# Patient Record
Sex: FEMALE | Race: WHITE | NOT HISPANIC OR LATINO | Employment: UNEMPLOYED | URBAN - METROPOLITAN AREA
[De-identification: names, ages, dates, MRNs, and addresses within clinical notes are randomized per-mention and may not be internally consistent; named-entity substitution may affect disease eponyms.]

---

## 2017-01-06 ENCOUNTER — HOSPITAL ENCOUNTER (OUTPATIENT)
Dept: PREADMISSION TESTING | Facility: HOSPITAL | Age: 52
Discharge: HOME | End: 2017-01-06

## 2017-10-26 ENCOUNTER — LAB VISIT (OUTPATIENT)
Dept: LAB | Facility: HOSPITAL | Age: 52
End: 2017-10-26
Attending: PSYCHIATRY & NEUROLOGY
Payer: COMMERCIAL

## 2017-10-26 ENCOUNTER — OFFICE VISIT (OUTPATIENT)
Dept: NEUROLOGY | Facility: CLINIC | Age: 52
End: 2017-10-26
Payer: COMMERCIAL

## 2017-10-26 VITALS
BODY MASS INDEX: 28.48 KG/M2 | SYSTOLIC BLOOD PRESSURE: 128 MMHG | RESPIRATION RATE: 16 BRPM | DIASTOLIC BLOOD PRESSURE: 84 MMHG | WEIGHT: 154.75 LBS | HEART RATE: 76 BPM | HEIGHT: 62 IN

## 2017-10-26 DIAGNOSIS — R41.3 MEMORY LOSS: ICD-10-CM

## 2017-10-26 DIAGNOSIS — R41.3 MEMORY LOSS: Primary | ICD-10-CM

## 2017-10-26 LAB — VIT B12 SERPL-MCNC: 1066 PG/ML

## 2017-10-26 PROCEDURE — 99204 OFFICE O/P NEW MOD 45 MIN: CPT | Mod: S$GLB,,, | Performed by: PSYCHIATRY & NEUROLOGY

## 2017-10-26 PROCEDURE — 82607 VITAMIN B-12: CPT

## 2017-10-26 PROCEDURE — 99999 PR PBB SHADOW E&M-EST. PATIENT-LVL III: CPT | Mod: PBBFAC,,, | Performed by: PSYCHIATRY & NEUROLOGY

## 2017-10-26 PROCEDURE — 86592 SYPHILIS TEST NON-TREP QUAL: CPT

## 2017-10-26 PROCEDURE — 36415 COLL VENOUS BLD VENIPUNCTURE: CPT

## 2017-10-26 RX ORDER — VITAMIN E 268 MG
400 CAPSULE ORAL DAILY
COMMUNITY
End: 2023-05-30

## 2017-10-26 RX ORDER — CHOLECALCIFEROL (VITAMIN D3) 125 MCG
1 CAPSULE ORAL DAILY
COMMUNITY
End: 2018-06-15

## 2017-10-26 RX ORDER — MIRTAZAPINE 15 MG/1
1 TABLET, FILM COATED ORAL DAILY
COMMUNITY
Start: 2017-09-28 | End: 2018-01-31

## 2017-10-26 RX ORDER — OMEPRAZOLE 40 MG/1
1 CAPSULE, DELAYED RELEASE ORAL DAILY
COMMUNITY
Start: 2017-10-04

## 2017-10-26 RX ORDER — LANOLIN ALCOHOL/MO/W.PET/CERES
100 CREAM (GRAM) TOPICAL DAILY
COMMUNITY
End: 2018-05-10

## 2017-10-26 NOTE — PROGRESS NOTES
Consult TERESA Mendez  HPI: Sakina Barrios is a 52 y.o. female with a history of memory problems first noted by patient and her daughters here today. The symptoms started 5 months ago.   Examples of memory problems noted include? Being disoriented to time, forgetting names, difficult time explaining herself. She has one spell in September in which she was confused, dizzy, had a migraine and was in a great deal of distress and was crying and seemed to be panicked.   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 Cooks on stove with carbon monoxide detectors  Mood is described as anxious. The patient does not have delusions, hallucinations, paranoia,  falls, or tremors. There is not a prior history of stroke.There is not a Family History of memory disorders. The patient is not still driving and is still cooking. The patient is not on any memory meds  Prior brain imaging or memory labs work-up? Labs and imaging below.   She reports a long standing history of headaches- she has a history of migraine, for many years which had improved but started again with these symptoms over the past 6 months. The patietn has tried prevention meds prior but is not sure. She uses Toradol PRN with good relief. Dizziness is described as funny feeling but not vertigo.   Family states for one spell of being more confused, she covers her ears and face and needed loud music to calm her. She felt dizzy with this for days.   Feels vision is not perfect but had normal eye exam.   Review of Systems   Constitutional: Negative for fever.   HENT: Negative for nosebleeds.    Eyes: Negative for double vision.   Respiratory: Negative for hemoptysis.    Cardiovascular: Negative for leg swelling.    Gastrointestinal: Negative for blood in stool.   Genitourinary: Negative for hematuria.   Musculoskeletal: Negative for falls.   Skin: Negative for itching.   Neurological: Negative for tremors.   Psychiatric/Behavioral: Positive for memory loss. The patient is nervous/anxious.          I have reviewed all of this patient's past medical and surgical histories as well as family and social histories and active allergies and medications as documented in the electronic medical record.        Exam:  Gen Appearance, well developed/nourished in no apparent distress  CV: 2+ distal pulses with no edema or swelling  Neuro:  MS: Awake, alert, oriented to place, person, time, situation. Sustains attention. Recent recall 2/3 at 3 minutes after hints only/remote memory intact, Language is full to spontaneous speech/repetition/naming/comprehension. Fund of Knowledge is full  There is some pyschomotor slowing and she appears anxious at times.   Names 2/2 presidents with anxiety interfering with effort  Draws clock well with mention that she could not do this for PCP  CN: Optic discs are flat with normal vasculature, PERRL, Extraoccular movements and visual fields are full. Normal facial sensation and strength, Hearing symmetric, Tongue and Palate are midline and strong. Shoulder Shrug symmetric and strong.  Motor: Normal bulk, tone, no abnormal movements. 5/5 strength bilateral upper/lower extremities with 2+ reflexes and not clonus  Sensory: symmetric to temp, and vibration,  Romberg negative  Cerebellar: Finger-nose,Heal-shin, Rapid alternating movements intact  Gait: Normal stance, no ataxia      MRI brain 10/2017: Atrophy but no acute changes    Carotid US 10/2017:  No stenosis    Assessment/Plan: Sakina Barrios is a 52 y.o. female with 5 months of memory loss. Anxiety prominent with other somatic symptoms like dizziness, visual changes without cause found.  I recommend:   1. Could have incidental Atrophy on MRI, but  needs Neuropsychological testing for further work up of memory  2. Suspect Anxiety as a contributor or cause of her complaints. Continue to follow with psychiatry  3. Check B12 RPR today and get results of CMP, CBC TSh  4. Plan to address her Chronic Migraine when more acute issues improve  5. No driving at this time (remain off)  RTC 12 weeks    CC: TERESA Mendez

## 2017-10-26 NOTE — LETTER
October 26, 2017      Donovan Mendez PA-C  [address]  [place]  [place] [address]           [facility]  [address] [facility] [address]  Phone: [phone]  Fax: [phone]          Patient: Sakina Barrios   MR Number: [id number]   YOB: 1965   Date of Visit: 10/26/2017       Dear Donovan Mendez:    Thank you for referring Sakina Barrios to me for evaluation. Attached you will find relevant portions of my assessment and plan of care.    If you have questions, please do not hesitate to call me. I look forward to following Sakina Barrios along with you.    Sincerely,    Bi Zayas MD    Enclosure  CC:  No Recipients    If you would like to receive this communication electronically, please contact [email or web address] or [phone] to request more information on [employer] access.    For providers and/or their staff who would like to refer a patient to [facility], please contact us through our one-stop-shop provider referral line, [facility], at [phone].    If you feel you have received this communication in error or would no longer like to receive these types of communications, please e-mail [email or web address]

## 2017-10-27 LAB — RPR SER QL: NORMAL

## 2017-11-17 ENCOUNTER — TELEPHONE (OUTPATIENT)
Dept: NEUROLOGY | Facility: CLINIC | Age: 52
End: 2017-11-17

## 2017-11-17 NOTE — TELEPHONE ENCOUNTER
It is fine to hold on this for now. Keep follow up as scheduled to discuss driving and any further symptoms.

## 2017-11-17 NOTE — TELEPHONE ENCOUNTER
----- Message from Fauzia Rosario sent at 2017  9:06 AM CST -----  Contact: RADHA Barrios  MRN: [id number]  : 1965  PCP: Donovan Mendez  Home Phone      [phone]  Work Phone      Not on file.  Mobile          [phone]      MESSAGE: The patient states she did not hear from anyone to schedule the memory testing. The patient states she is doing much better at this time. She states her psychiatrist adjusted her medications and she is no longer having memory issues. She was having a lot of issues with depression and anxiety at that same time. Patient does not feel the memory test is necessary at this time depending on Dr. Zayas's advice regarding the test.  Phone:[phone]

## 2018-01-31 ENCOUNTER — PROCEDURE VISIT (OUTPATIENT)
Dept: NEUROLOGY | Facility: CLINIC | Age: 53
End: 2018-01-31
Payer: COMMERCIAL

## 2018-01-31 ENCOUNTER — OFFICE VISIT (OUTPATIENT)
Dept: NEUROLOGY | Facility: CLINIC | Age: 53
End: 2018-01-31
Payer: COMMERCIAL

## 2018-01-31 VITALS
HEIGHT: 62 IN | DIASTOLIC BLOOD PRESSURE: 76 MMHG | HEART RATE: 80 BPM | SYSTOLIC BLOOD PRESSURE: 110 MMHG | WEIGHT: 160.94 LBS | BODY MASS INDEX: 29.62 KG/M2 | RESPIRATION RATE: 18 BRPM

## 2018-01-31 DIAGNOSIS — M54.31 RIGHT SIDED SCIATICA: ICD-10-CM

## 2018-01-31 DIAGNOSIS — M79.18 CERVICAL MYOFASCIAL PAIN SYNDROME: ICD-10-CM

## 2018-01-31 PROCEDURE — 3008F BODY MASS INDEX DOCD: CPT | Mod: S$GLB,,, | Performed by: NURSE PRACTITIONER

## 2018-01-31 PROCEDURE — 99214 OFFICE O/P EST MOD 30 MIN: CPT | Mod: S$GLB,,, | Performed by: NURSE PRACTITIONER

## 2018-01-31 PROCEDURE — 20553 NJX 1/MLT TRIGGER POINTS 3/>: CPT | Mod: PBBFAC | Performed by: NURSE PRACTITIONER

## 2018-01-31 PROCEDURE — 20553 NJX 1/MLT TRIGGER POINTS 3/>: CPT | Mod: S$GLB,,, | Performed by: NURSE PRACTITIONER

## 2018-01-31 PROCEDURE — 99999 PR PBB SHADOW E&M-EST. PATIENT-LVL IV: CPT | Mod: PBBFAC,,, | Performed by: NURSE PRACTITIONER

## 2018-01-31 RX ORDER — ASPIRIN 325 MG
325 TABLET ORAL DAILY
COMMUNITY
End: 2022-10-17 | Stop reason: CLARIF

## 2018-01-31 RX ORDER — GABAPENTIN 300 MG/1
300 CAPSULE ORAL 3 TIMES DAILY
Qty: 90 CAPSULE | Refills: 11 | Status: SHIPPED | OUTPATIENT
Start: 2018-01-31 | End: 2018-03-21 | Stop reason: SDUPTHER

## 2018-01-31 RX ORDER — CYCLOBENZAPRINE HCL 10 MG
10 TABLET ORAL 3 TIMES DAILY PRN
Qty: 60 TABLET | Refills: 3 | Status: SHIPPED | OUTPATIENT
Start: 2018-01-31 | End: 2018-06-28

## 2018-01-31 RX ORDER — CYCLOBENZAPRINE HCL 10 MG
TABLET ORAL
COMMUNITY
Start: 2018-01-09 | End: 2018-01-31 | Stop reason: SDUPTHER

## 2018-01-31 NOTE — PROGRESS NOTES
HPI: Sakina Barrios is a 52 year old  female, evaluated for memory complaints in 10/2017, which resolved with improvement to her anxiety after medication adjustment per psychiatry. She has a history of migraines.     She presents today for a follow up visit. Her short term memory loss complaints resolved with medication adjustments per her psychiatrist, which improved her anxiety. She canceled neuropsych testing accordingly. Anxiety is well controlled currently.     Her headaches also resolved with improvement to her anxiety.     She presents today with complaint of right sciatica, which she experiences intermittently. This began last week. She has had right sciatica for years. The pain is localized to her right buttock, and does not radiate to her RLE. She denies any numbness or tingling. She denies falls, bladder/bowel issues. Standing and sitting worsens her complaints.     She has had left cervical tension/pain since lifting a bag of onions one month ago. She received cervical TPI's from another provider in the past, which was effective.     Review of Systems   Constitutional: Negative for fever.   HENT: Negative for nosebleeds.    Eyes: Negative for double vision.   Respiratory: Negative for hemoptysis.    Cardiovascular: Negative for leg swelling.   Gastrointestinal: Negative for blood in stool.   Genitourinary: Negative for hematuria.   Musculoskeletal: Positive for back pain and neck pain. Negative for falls.   Skin: Negative for itching.   Neurological: Negative for tingling, tremors and headaches.   Psychiatric/Behavioral: Negative for memory loss. The patient is not nervous/anxious.        I have reviewed all of this patient's past medical and surgical histories as well as family and social histories and active allergies and medications as documented in the electronic medical record.    Exam:  Gen Appearance, well developed/nourished in no apparent distress  CV: 2+ distal pulses with no  edema or swelling  Neuro:  MS: Awake, alert, oriented to place, person, time, situation. Sustains attention. Recent recall intact/remote memory intact, Language is full to spontaneous speech/repetition/naming/comprehension. Fund of Knowledge is full. No psychomotor slowing or anxiety noted today.   Names 2/2 presidents with anxiety interfering with effort  Draws clock well with mention that she could not do this for PCP  CN: Optic discs are flat with normal vasculature, PERRL, Extraoccular movements and visual fields are full. Normal facial sensation and strength, Hearing symmetric, Tongue and Palate are midline and strong. Shoulder Shrug symmetric and strong.  Motor: Normal bulk, tone, no abnormal movements. 5/5 strength bilateral upper/lower extremities with 2+ reflexes and not clonus  Sensory: symmetric to temp, and vibration,  Romberg negative  Cerebellar: Finger-nose,Heal-shin, Rapid alternating movements intact  Gait: Normal stance, no ataxia  MSK Survey: negative SLR test bilaterally, palpable trigger points to left trapezius and cervical paraspinal muscles.     Imaging:    MRI Brain 10/2017: Atrophy but no acute changes    Carotid US 10/2017:  No stenosis    Assessment/Plan: Sakina Barrios is a 52 year old  female, evaluated for memory complaints in 10/2017, which resolved with improvement to her anxiety after medication adjustment per psychiatry. She has a history of migraines.     I recommend:   1. C-spine X-rays and MRI L-spine to evaluate for degenerative changes, and obtain prior records from [facility].   2. Consider referral to pain management for right sciatica if needed pending imaging results. She tried PT for her MSK complaints, prior without effect.   3. Start compound cream for cervical complaints and order cervical TPI's, as this was helpful prior.   4. Start Gabapentin 300 mg tid for cervical and lumbar complaints.   5. Continue prn Flexeril for lumbar spasms.   6. Memory loss  resolved with management of her anxiety. She may return to driving at this time.     FU 6 weeks.       1. Could have incidental Atrophy on MRI, but needs Neuropsychological testing for further work up of memory  2. Suspect Anxiety as a contributor or cause of her complaints. Continue to follow with psychiatry  3. Check B12 RPR today and get results of CMP, CBC TSh  4. Plan to address her Chronic Migraine when more acute issues improve  5. No driving at this time (remain off)  RTC 12 weeks    CC: TERESA Mendez

## 2018-03-21 ENCOUNTER — OFFICE VISIT (OUTPATIENT)
Dept: NEUROLOGY | Facility: CLINIC | Age: 53
End: 2018-03-21
Payer: COMMERCIAL

## 2018-03-21 ENCOUNTER — HOSPITAL ENCOUNTER (OUTPATIENT)
Dept: RADIOLOGY | Facility: HOSPITAL | Age: 53
Discharge: HOME | End: 2018-03-21
Attending: NURSE PRACTITIONER
Payer: COMMERCIAL

## 2018-03-21 VITALS
RESPIRATION RATE: 14 BRPM | WEIGHT: 155.19 LBS | SYSTOLIC BLOOD PRESSURE: 112 MMHG | HEART RATE: 72 BPM | BODY MASS INDEX: 28.56 KG/M2 | HEIGHT: 62 IN | DIASTOLIC BLOOD PRESSURE: 82 MMHG

## 2018-03-21 DIAGNOSIS — M54.31 RIGHT SIDED SCIATICA: ICD-10-CM

## 2018-03-21 DIAGNOSIS — M25.552 BILATERAL HIP PAIN: ICD-10-CM

## 2018-03-21 DIAGNOSIS — M25.551 BILATERAL HIP PAIN: ICD-10-CM

## 2018-03-21 DIAGNOSIS — M79.18 CERVICAL MYOFASCIAL PAIN SYNDROME: Primary | ICD-10-CM

## 2018-03-21 DIAGNOSIS — M54.5 LOW BACK PAIN, UNSPECIFIED BACK PAIN LATERALITY, UNSPECIFIED CHRONICITY, WITH SCIATICA PRESENCE UNSPECIFIED: ICD-10-CM

## 2018-03-21 DIAGNOSIS — M54.2 NECK PAIN: ICD-10-CM

## 2018-03-21 DIAGNOSIS — M79.18 CERVICAL MYOFASCIAL PAIN SYNDROME: ICD-10-CM

## 2018-03-21 PROBLEM — M54.50 LOWER BACK PAIN: Status: ACTIVE | Noted: 2018-03-21

## 2018-03-21 PROCEDURE — 99214 OFFICE O/P EST MOD 30 MIN: CPT | Mod: S$GLB,,, | Performed by: NURSE PRACTITIONER

## 2018-03-21 PROCEDURE — 72040 X-RAY EXAM NECK SPINE 2-3 VW: CPT | Mod: TC

## 2018-03-21 PROCEDURE — 99999 PR PBB SHADOW E&M-EST. PATIENT-LVL III: CPT | Mod: PBBFAC,,, | Performed by: NURSE PRACTITIONER

## 2018-03-21 PROCEDURE — 73521 X-RAY EXAM HIPS BI 2 VIEWS: CPT | Mod: TC

## 2018-03-21 PROCEDURE — 72040 X-RAY EXAM NECK SPINE 2-3 VW: CPT | Mod: 26,,, | Performed by: RADIOLOGY

## 2018-03-21 PROCEDURE — 73521 X-RAY EXAM HIPS BI 2 VIEWS: CPT | Mod: 26,,, | Performed by: RADIOLOGY

## 2018-03-21 RX ORDER — GABAPENTIN 100 MG/1
300 CAPSULE ORAL 3 TIMES DAILY
Qty: 270 CAPSULE | Refills: 11 | Status: SHIPPED | OUTPATIENT
Start: 2018-03-21 | End: 2018-03-21 | Stop reason: SDUPTHER

## 2018-03-21 RX ORDER — GABAPENTIN 100 MG/1
100 CAPSULE ORAL 3 TIMES DAILY
Qty: 90 CAPSULE | Refills: 11 | Status: SHIPPED | OUTPATIENT
Start: 2018-03-21 | End: 2018-07-31 | Stop reason: SDUPTHER

## 2018-03-21 RX ORDER — QUETIAPINE FUMARATE 100 MG/1
200 TABLET, FILM COATED ORAL NIGHTLY
COMMUNITY
Start: 2018-03-05 | End: 2019-05-22

## 2018-03-21 RX ORDER — PROMETHAZINE HYDROCHLORIDE AND DEXTROMETHORPHAN HYDROBROMIDE 6.25; 15 MG/5ML; MG/5ML
5 SYRUP ORAL 3 TIMES DAILY PRN
COMMUNITY
Start: 2018-03-19 | End: 2018-04-24

## 2018-03-21 NOTE — PROGRESS NOTES
HPI: Sakina Barrios is a 52 year old  female, evaluated for memory complaints in 10/2017, which resolved with improvement to her anxiety after medication adjustment per psychiatry. She has a history of migraines.     She presents today for a follow up visit. Lumbar and cervical imaging was ordered at her last visit, but was not completed, as she was waiting to change insurance plans.     She received cervical TPI's on 1/31/2018 for her cervical complaints, which provided her with 90% relief, and lasted for 3 weeks. Her neck pain has recently returned. She would like to repeat the TPI's. Records from [facility] were not obtained after request.     Compound cream is helpful for her MSK complaints.     Gabapentin was started at her last visit, which was very helpful for her neck and lower back pain, but resulted in slurred speech. She would like to try a lower dose of Gabapentin.     Her short term memory loss complaints resolved with medication adjustments per her psychiatrist, which improved her anxiety. She canceled neuropsych testing accordingly. Anxiety is well controlled currently.     Her headaches also resolved with improvement to her anxiety.     She continues with complaint of right sciatica, which she experiences intermittently, and began two months ago. She has had right sciatica for years. The pain is localized to her right buttock, and does not radiate to her RLE. She denies any numbness or tingling. She denies falls, bladder/bowel issues. Standing and sitting worsens her complaints. She also admits to bilateral hip pain.     She has had left cervical tension/pain since lifting a bag of onions three months ago.     Review of Systems   Constitutional: Negative for fever.   HENT: Negative for nosebleeds.    Eyes: Negative for double vision.   Respiratory: Negative for hemoptysis.    Cardiovascular: Negative for leg swelling.   Gastrointestinal: Negative for blood in stool.   Genitourinary:  Negative for hematuria.   Musculoskeletal: Positive for back pain, joint pain and neck pain. Negative for falls.   Skin: Negative for itching.   Neurological: Negative for tingling, tremors and headaches.   Psychiatric/Behavioral: Negative for memory loss. The patient is not nervous/anxious.        I have reviewed all of this patient's past medical and surgical histories as well as family and social histories and active allergies and medications as documented in the electronic medical record.    Exam:  Gen Appearance, well developed/nourished in no apparent distress  CV: 2+ distal pulses with no edema or swelling  Neuro:  MS: Awake, alert, oriented to place, person, time, situation. Sustains attention. Recent recall intact/remote memory intact, Language is full to spontaneous speech/repetition/naming/comprehension. Fund of Knowledge is full. No psychomotor slowing or anxiety noted today.   Names 2/2 presidents with anxiety interfering with effort  Draws clock well with mention that she could not do this for PCP  CN: Optic discs are flat with normal vasculature, PERRL, Extraoccular movements and visual fields are full. Normal facial sensation and strength, Hearing symmetric, Tongue and Palate are midline and strong. Shoulder Shrug symmetric and strong.  Motor: Normal bulk, tone, no abnormal movements. 5/5 strength bilateral upper/lower extremities with 2+ reflexes and not clonus  Sensory: symmetric to temp, and vibration,  Romberg negative  Cerebellar: Finger-nose,Heal-shin, Rapid alternating movements intact  Gait: Normal stance, no ataxia  MSK Survey: negative SLR test bilaterally, palpable trigger points to left trapezius and cervical paraspinal muscles.     Imaging:  C-spine X-rays and MRI L-spine ordered at 1/2018 visit not completed.     MRI Brain 10/2017: Atrophy but no acute changes    Carotid US 10/2017:  No stenosis    Assessment/Plan: Sakina Barrios is a 52 year old  female, evaluated for  memory complaints in 10/2017, which resolved with improvement to her anxiety after medication adjustment per psychiatry. She has a history of migraines.     I recommend:   1. C-spine X-rays, bilateral hip X-rays, and MRI L-spine to evaluate for degenerative changes, and obtain prior records from [facility].   2. Consider referral to pain management for right sciatica if needed pending imaging results. She tried PT for her MSK complaints, prior without effect.   3. Continue compound cream for cervical complaints and repeat cervical TPI's, as this was helpful prior.   4. Lower Gabapentin to 100 mg tid for cervical and lumbar complaints, as she had sedation and speech issues with 300 mg tid dosing.    5. Continue prn Flexeril for lumbar spasms.   6. Memory loss resolved with management of her anxiety. She is clear to drive.     FU 6 weeks.     CC: Donovan Mendez NP

## 2018-03-27 ENCOUNTER — HOSPITAL ENCOUNTER (OUTPATIENT)
Dept: RADIOLOGY | Facility: HOSPITAL | Age: 53
Discharge: HOME | End: 2018-03-27
Attending: NURSE PRACTITIONER
Payer: COMMERCIAL

## 2018-03-27 DIAGNOSIS — M54.5 LOW BACK PAIN, UNSPECIFIED BACK PAIN LATERALITY, UNSPECIFIED CHRONICITY, WITH SCIATICA PRESENCE UNSPECIFIED: ICD-10-CM

## 2018-03-27 PROCEDURE — 72148 MRI LUMBAR SPINE W/O DYE: CPT | Mod: 26,,, | Performed by: RADIOLOGY

## 2018-03-27 PROCEDURE — 72148 MRI LUMBAR SPINE W/O DYE: CPT | Mod: TC

## 2018-03-29 DIAGNOSIS — M54.31 RIGHT SIDED SCIATICA: Primary | ICD-10-CM

## 2018-04-02 ENCOUNTER — TELEPHONE (OUTPATIENT)
Dept: OBSTETRICS AND GYNECOLOGY | Facility: CLINIC | Age: 53
End: 2018-04-02

## 2018-04-02 DIAGNOSIS — Z12.31 ENCOUNTER FOR SCREENING MAMMOGRAM FOR BREAST CANCER: Primary | ICD-10-CM

## 2018-04-02 NOTE — TELEPHONE ENCOUNTER
----- Message from Jessy Mendez MA sent at 4/2/2018  3:55 PM CDT -----  Contact: marisabel Barrios  MRN: [id number]  Home Phone      [phone]  Work Phone      Not on file.  Mobile          [phone]    Patient Care Team:  Donovan Mendez PA-C as PCP - General (Family Medicine)  OB? No  What phone number can you be reached at?  [phone]  Message:  Please link mammo orders to appt 05/10/18.  Thanks!

## 2018-04-04 ENCOUNTER — PROCEDURE VISIT (OUTPATIENT)
Dept: NEUROLOGY | Facility: CLINIC | Age: 53
End: 2018-04-04
Payer: COMMERCIAL

## 2018-04-04 DIAGNOSIS — M79.18 CERVICAL MYOFASCIAL PAIN SYNDROME: ICD-10-CM

## 2018-04-04 PROCEDURE — 20553 NJX 1/MLT TRIGGER POINTS 3/>: CPT | Mod: S$GLB,,, | Performed by: NURSE PRACTITIONER

## 2018-04-24 ENCOUNTER — LAB VISIT (OUTPATIENT)
Dept: LAB | Facility: HOSPITAL | Age: 53
End: 2018-04-24
Attending: NURSE PRACTITIONER
Payer: COMMERCIAL

## 2018-04-24 ENCOUNTER — OFFICE VISIT (OUTPATIENT)
Dept: NEUROLOGY | Facility: CLINIC | Age: 53
End: 2018-04-24
Payer: COMMERCIAL

## 2018-04-24 VITALS
WEIGHT: 156.06 LBS | RESPIRATION RATE: 16 BRPM | HEIGHT: 62 IN | BODY MASS INDEX: 28.72 KG/M2 | HEART RATE: 72 BPM | SYSTOLIC BLOOD PRESSURE: 112 MMHG | DIASTOLIC BLOOD PRESSURE: 78 MMHG

## 2018-04-24 DIAGNOSIS — M79.10 MYALGIA: ICD-10-CM

## 2018-04-24 DIAGNOSIS — M79.18 CERVICAL MYOFASCIAL PAIN SYNDROME: ICD-10-CM

## 2018-04-24 DIAGNOSIS — M54.2 NECK PAIN: ICD-10-CM

## 2018-04-24 DIAGNOSIS — M62.838 CERVICAL PARASPINAL MUSCLE SPASM: ICD-10-CM

## 2018-04-24 DIAGNOSIS — M54.31 RIGHT SIDED SCIATICA: ICD-10-CM

## 2018-04-24 DIAGNOSIS — M25.552 BILATERAL HIP PAIN: ICD-10-CM

## 2018-04-24 DIAGNOSIS — M54.5 LOW BACK PAIN, UNSPECIFIED BACK PAIN LATERALITY, UNSPECIFIED CHRONICITY, WITH SCIATICA PRESENCE UNSPECIFIED: ICD-10-CM

## 2018-04-24 DIAGNOSIS — M79.10 MYALGIA: Primary | ICD-10-CM

## 2018-04-24 DIAGNOSIS — M25.551 BILATERAL HIP PAIN: ICD-10-CM

## 2018-04-24 LAB
25(OH)D3+25(OH)D2 SERPL-MCNC: 20 NG/ML
ALBUMIN SERPL BCP-MCNC: 3.2 G/DL
ALP SERPL-CCNC: 103 U/L
ALT SERPL W/O P-5'-P-CCNC: 11 U/L
ANION GAP SERPL CALC-SCNC: 11 MMOL/L
AST SERPL-CCNC: 14 U/L
BASOPHILS # BLD AUTO: 0.04 K/UL
BASOPHILS NFR BLD: 0.4 %
BILIRUB SERPL-MCNC: 0.3 MG/DL
BUN SERPL-MCNC: 9 MG/DL
CALCIUM SERPL-MCNC: 9.2 MG/DL
CHLORIDE SERPL-SCNC: 102 MMOL/L
CO2 SERPL-SCNC: 25 MMOL/L
CREAT SERPL-MCNC: 0.7 MG/DL
DIFFERENTIAL METHOD: ABNORMAL
EOSINOPHIL # BLD AUTO: 0.1 K/UL
EOSINOPHIL NFR BLD: 1.2 %
ERYTHROCYTE [DISTWIDTH] IN BLOOD BY AUTOMATED COUNT: 15 %
ERYTHROCYTE [SEDIMENTATION RATE] IN BLOOD BY WESTERGREN METHOD: 35 MM/HR
EST. GFR  (AFRICAN AMERICAN): >60 ML/MIN/1.73 M^2
EST. GFR  (NON AFRICAN AMERICAN): >60 ML/MIN/1.73 M^2
GLUCOSE SERPL-MCNC: 109 MG/DL
HCT VFR BLD AUTO: 38.5 %
HGB BLD-MCNC: 12.8 G/DL
LYMPHOCYTES # BLD AUTO: 3.3 K/UL
LYMPHOCYTES NFR BLD: 28.9 %
MCH RBC QN AUTO: 28.5 PG
MCHC RBC AUTO-ENTMCNC: 33.2 G/DL
MCV RBC AUTO: 86 FL
MONOCYTES # BLD AUTO: 0.5 K/UL
MONOCYTES NFR BLD: 4.8 %
NEUTROPHILS # BLD AUTO: 7.3 K/UL
NEUTROPHILS NFR BLD: 64.7 %
PLATELET # BLD AUTO: 290 K/UL
PMV BLD AUTO: 10.3 FL
POTASSIUM SERPL-SCNC: 3.1 MMOL/L
PROT SERPL-MCNC: 7.2 G/DL
RBC # BLD AUTO: 4.49 M/UL
SODIUM SERPL-SCNC: 138 MMOL/L
VIT B12 SERPL-MCNC: >2000 PG/ML
WBC # BLD AUTO: 11.25 K/UL

## 2018-04-24 PROCEDURE — 99214 OFFICE O/P EST MOD 30 MIN: CPT | Mod: S$GLB,,, | Performed by: NURSE PRACTITIONER

## 2018-04-24 PROCEDURE — 82306 VITAMIN D 25 HYDROXY: CPT

## 2018-04-24 PROCEDURE — 99999 PR PBB SHADOW E&M-EST. PATIENT-LVL V: CPT | Mod: PBBFAC,,, | Performed by: NURSE PRACTITIONER

## 2018-04-24 PROCEDURE — 86431 RHEUMATOID FACTOR QUANT: CPT

## 2018-04-24 PROCEDURE — 36415 COLL VENOUS BLD VENIPUNCTURE: CPT

## 2018-04-24 PROCEDURE — 85651 RBC SED RATE NONAUTOMATED: CPT

## 2018-04-24 PROCEDURE — 85025 COMPLETE CBC W/AUTO DIFF WBC: CPT

## 2018-04-24 PROCEDURE — 86038 ANTINUCLEAR ANTIBODIES: CPT

## 2018-04-24 PROCEDURE — 82607 VITAMIN B-12: CPT

## 2018-04-24 PROCEDURE — 80053 COMPREHEN METABOLIC PANEL: CPT

## 2018-04-24 NOTE — PROGRESS NOTES
HPI: Sakina Barrios is a 52 year old  female, evaluated for memory complaints in 10/2017, which resolved with improvement to her anxiety after medication adjustment per psychiatry. She has a history of migraines.     She presents today for a follow up visit. She received a second set of cervical TPI's on 4/4/2018, which were very effective, but only provided her with one week of relief. Neck pain has been ongoing since some heavy lifting 3 months ago; however, she complains of diffuse musculoskeletal complaints, as well as muscle pain and muscle spasms, worse since reducing her dose of Gabapentin, due to slurred speech. She completed an MRI L-spine, which showed a mild disc bulge at left L3/4, which does not explain her right sciatica. Right sciatica has improved, however.     She was referred to pain management for her right sciatica; however, she has no knowledge of this.     C-spine X-rays were unremarkable for cause of her complaints, and bilateral hip X-rays showed very mild changes only. She denies depression, but does admit to anxiety. She grinds her teeth throughout the day, and at night. No further complaint of memory loss. Anxiety is followed by psychiatry.     Compound cream is helpful for her MSK complaints.     Gabapentin was started at her last visit, which was very helpful for her neck and lower back pain, but resulted in slurred speech. She would like to try a lower dose of Gabapentin.     No headaches at this time.     Review of Systems   Constitutional: Negative for fever.   HENT: Negative for nosebleeds.    Eyes: Negative for double vision.   Respiratory: Negative for hemoptysis.    Cardiovascular: Negative for leg swelling.   Gastrointestinal: Negative for blood in stool.   Genitourinary: Negative for hematuria.   Musculoskeletal: Positive for back pain, joint pain, myalgias and neck pain. Negative for falls.   Skin: Negative for itching.   Neurological: Negative for dizziness,  tingling, tremors, sensory change, focal weakness and headaches.   Psychiatric/Behavioral: Negative for memory loss. The patient is nervous/anxious.        I have reviewed all of this patient's past medical and surgical histories as well as family and social histories and active allergies and medications as documented in the electronic medical record.    Exam:  Gen Appearance, well developed/nourished in no apparent distress  CV: 2+ distal pulses with no edema or swelling  Neuro:  MS: Awake, alert, oriented to place, person, time, situation. Sustains attention. Recent recall intact/remote memory intact, Language is full to spontaneous speech/repetition/naming/comprehension. Fund of Knowledge is full. No psychomotor slowing or anxiety noted today.   Names 2/2 presidents with anxiety interfering with effort  Draws clock well with mention that she could not do this for PCP  CN: Optic discs are flat with normal vasculature, PERRL, Extraoccular movements and visual fields are full. Normal facial sensation and strength, Hearing symmetric, Tongue and Palate are midline and strong. Shoulder Shrug symmetric and strong.  Motor: Normal bulk, tone, no abnormal movements. 5/5 strength bilateral upper/lower extremities with 2+ reflexes and not clonus  Sensory: symmetric to temp, and vibration,  Romberg negative  Cerebellar: Finger-nose,Heal-shin, Rapid alternating movements intact  Gait: Normal stance, no ataxia  MSK Survey: negative SLR test bilaterally, palpable trigger points to left trapezius and cervical paraspinal muscles.     Imaging:  3/2018 MRI L-spine 3/2018:  Minimal desiccation of the L3 disc with a questionable small left paracentral disc bulge at L3-L4.  No significant spinal canal or foraminal encroachment.    3/2018 C-spine X-rays:  Cervical spine, AP and lateral: Vertebral body alignment, heights and disc spaces are within normal limits.  The prevertebral soft tissues are normal.  No fracture or subluxation is  seen.    3/2018 Bilateral hip X-rays:  Bilateral hip, 2 views including AP pelvis: There is mild bilateral acetabular roof sclerosis.  The joint spaces are maintained.  No fracture or dislocation is identified.    MRI Brain 10/2017: Atrophy but no acute changes    Carotid US 10/2017:  No stenosis    Assessment/Plan: Sakina Barrios is a 52 year old  female, evaluated for memory complaints in 10/2017, which resolved with improvement to her anxiety after medication adjustment per psychiatry. She has a history of migraines.     I recommend:   1. Very mild findings on bilateral hip X-rays and MRI L-spine, and C-spine X-rays were overall normal-nothing to explain her diffuse, ongoing MSK complaints, which most likely represent a fibromyalgia; however, I will order Rheum labs to rule out any autoimmune/inflammatory process.   2. CBC, CMP, TSH< B12, Vitamin D, B12, LORRIE, Rheumatoid Factor, and ESR.   3. Start PT for myalgic complaints, bilateral hip pain, lumbar pain, and neck pain. She was referred to pain clinic for right sciatica, but does not recall this; right sciatica has improved.   4. Continue compound cream for cervical complaints and repeat cervical TPI's, as this was helpful prior.   5. Continue Gabapentin to 100 mg tid for cervical and lumbar complaints; she had sedation and speech issues with 300 mg tid dosing, but had more pain relief with this dose. She is also taking 60 mg Cymbalta per psychiatry.   6. Continue prn Flexeril for lumbar spasms-mild relief only.   7. Memory loss resolved with management of her anxiety. She is clear to drive.     FU 3 months.     CC: Donovan Mendez NP

## 2018-04-25 LAB
ANA SER QL IF: NORMAL
RHEUMATOID FACT SERPL-ACNC: <10 IU/ML

## 2018-04-26 DIAGNOSIS — R70.0 ELEVATED SED RATE: Primary | ICD-10-CM

## 2018-05-01 ENCOUNTER — PATIENT MESSAGE (OUTPATIENT)
Dept: ADMINISTRATIVE | Facility: OTHER | Age: 53
End: 2018-05-01

## 2018-05-10 ENCOUNTER — HOSPITAL ENCOUNTER (OUTPATIENT)
Dept: RADIOLOGY | Facility: HOSPITAL | Age: 53
Discharge: HOME | End: 2018-05-10
Attending: OBSTETRICS & GYNECOLOGY
Payer: COMMERCIAL

## 2018-05-10 ENCOUNTER — OFFICE VISIT (OUTPATIENT)
Dept: OBSTETRICS AND GYNECOLOGY | Facility: CLINIC | Age: 53
End: 2018-05-10
Payer: COMMERCIAL

## 2018-05-10 VITALS
DIASTOLIC BLOOD PRESSURE: 81 MMHG | SYSTOLIC BLOOD PRESSURE: 126 MMHG | RESPIRATION RATE: 13 BRPM | HEIGHT: 62 IN | WEIGHT: 156.38 LBS | BODY MASS INDEX: 28.78 KG/M2 | HEART RATE: 75 BPM

## 2018-05-10 VITALS — WEIGHT: 156 LBS | HEIGHT: 62 IN | BODY MASS INDEX: 28.71 KG/M2

## 2018-05-10 DIAGNOSIS — Z01.419 WELL WOMAN EXAM WITH ROUTINE GYNECOLOGICAL EXAM: ICD-10-CM

## 2018-05-10 DIAGNOSIS — N95.1 MENOPAUSAL SYMPTOMS: Primary | ICD-10-CM

## 2018-05-10 DIAGNOSIS — Z12.11 COLON CANCER SCREENING: ICD-10-CM

## 2018-05-10 DIAGNOSIS — Z12.31 ENCOUNTER FOR SCREENING MAMMOGRAM FOR BREAST CANCER: ICD-10-CM

## 2018-05-10 DIAGNOSIS — M79.7 FIBROMYALGIA: ICD-10-CM

## 2018-05-10 PROCEDURE — 77067 SCR MAMMO BI INCL CAD: CPT | Mod: TC

## 2018-05-10 PROCEDURE — 77063 BREAST TOMOSYNTHESIS BI: CPT | Mod: 26,,, | Performed by: RADIOLOGY

## 2018-05-10 PROCEDURE — 99999 PR PBB SHADOW E&M-EST. PATIENT-LVL III: CPT | Mod: PBBFAC,,, | Performed by: OBSTETRICS & GYNECOLOGY

## 2018-05-10 PROCEDURE — 77067 SCR MAMMO BI INCL CAD: CPT | Mod: 26,,, | Performed by: RADIOLOGY

## 2018-05-10 PROCEDURE — 99396 PREV VISIT EST AGE 40-64: CPT | Mod: S$GLB,,, | Performed by: OBSTETRICS & GYNECOLOGY

## 2018-05-10 RX ORDER — ALBUTEROL SULFATE 90 UG/1
AEROSOL, METERED RESPIRATORY (INHALATION)
COMMUNITY
End: 2018-07-31

## 2018-05-10 RX ORDER — ACETAMINOPHEN 500 MG
5000 TABLET ORAL DAILY
COMMUNITY
End: 2023-05-30

## 2018-05-10 RX ORDER — POTASSIUM CHLORIDE 750 MG/1
TABLET, EXTENDED RELEASE ORAL
COMMUNITY
End: 2018-07-31

## 2018-05-10 RX ORDER — OMEPRAZOLE 20 MG/1
CAPSULE, DELAYED RELEASE ORAL
COMMUNITY
End: 2018-05-30 | Stop reason: DRUGHIGH

## 2018-05-10 RX ORDER — CYCLOBENZAPRINE HCL 5 MG
TABLET ORAL
COMMUNITY
End: 2018-05-30 | Stop reason: DRUGHIGH

## 2018-05-10 RX ORDER — SCOLOPAMINE TRANSDERMAL SYSTEM 1 MG/1
PATCH, EXTENDED RELEASE TRANSDERMAL
COMMUNITY
End: 2019-06-26

## 2018-05-10 RX ORDER — ALPRAZOLAM 1 MG/1
TABLET ORAL
COMMUNITY
End: 2018-05-30 | Stop reason: SDUPTHER

## 2018-05-10 RX ORDER — VITAMIN E 268 MG
CAPSULE ORAL
COMMUNITY
End: 2018-05-30 | Stop reason: SDUPTHER

## 2018-05-10 RX ORDER — ASPIRIN 325 MG
TABLET ORAL
COMMUNITY
End: 2018-05-30 | Stop reason: SDUPTHER

## 2018-05-10 NOTE — PROGRESS NOTES
Subjective:    Patient ID: Sakina Barriso is a 53 y.o. female.     Chief Complaint: Annual Well Woman Exam     History of Present Illness:  Sakina Coffey presents today for Annual Well Woman exam. .No LMP recorded. Patient has had a hysterectomy.. She is currently using Oral Estrogen and she reports no problems with hot flashes, night sweats, irritability and vaginal dryness. She denies breast tenderness, masses, nipple discharge.  She reports no problems with urination. Bowel movements have not significantly changed. She is having problems with fibromyalgia and has started gabapentin but has not improved significantly.     Menstrual History:   No LMP recorded. Patient has had a hysterectomy.    OB History      Para Term  AB Living    4 3 3   1 3    SAB TAB Ectopic Multiple Live Births    1       3          Review of Systems   Constitutional: Negative for activity change, appetite change, chills, diaphoresis, fatigue, fever and unexpected weight change.   HENT: Negative for mouth sores and tinnitus.    Eyes: Negative for discharge and visual disturbance.   Respiratory: Negative for cough, shortness of breath and wheezing.    Cardiovascular: Negative for chest pain, palpitations and leg swelling.   Gastrointestinal: Negative for abdominal pain, blood in stool, constipation, diarrhea, nausea and vomiting.   Endocrine: Negative for diabetes, hair loss, hot flashes, hyperthyroidism and hypothyroidism.   Genitourinary: Negative for decreased libido, dyspareunia, dysuria, flank pain, frequency, genital sores, hematuria, menorrhagia, menstrual problem, pelvic pain, urgency, vaginal bleeding, vaginal discharge, vaginal pain, urinary incontinence, postcoital bleeding and vaginal odor.   Musculoskeletal: Positive for back pain, joint swelling and myalgias.   Skin:  Negative for rash, no acne and hair changes.   Neurological: Positive for headaches. Negative for seizures, syncope and numbness.    Hematological: Negative for adenopathy. Does not bruise/bleed easily.   Psychiatric/Behavioral: Positive for depression and sleep disturbance. The patient is nervous/anxious.    Breast: Negative for breast pain and nipple discharge        Objective:    Vital Signs:  Vitals:    05/10/18 1624   BP: 126/81   Pulse: 75   Resp: 13       Physical Exam:  General:  alert,normal appearing gravid female   Skin:  Skin color, texture, turgor normal. No rashes or lesions   HEENT:  conjunctivae/corneas clear. PERRL.   Neck: supple, trachea midline, no adenopathy or thyromegally   Respiratory:  clear to auscultation bilaterally   Heart:  regular rate and rhythm, S1, S2 normal, no murmur, click, rub or gallop   Breasts:   Nipples are protruding and have no nipple discharge. No palpable masses, erythema, skin changes, tenderness, or adenopathy.   Abdomen:  soft, non-tender. Bowel sounds normal. No masses,  no organomegaly   Pelvis: External genitalia: normal general appearance  Urinary system: urethral meatus normal, bladder nontender  Vaginal: normal mucosa without prolapse or lesions  Cervix: removed surgically  Uterus: removed surgically  Adnexa: removed surgically   Extremities: Normal ROM; no edema, no cyanosis   Neurologial: Normal strength and tone. No focal numbness or weakness. Reflexes 2+ and equal.   Psychiatric: normal mood, speech, dress, and thought processes         Assessment:      1. Menopausal symptoms    2. Well woman exam with routine gynecological exam    3. Fibromyalgia    4. Colon cancer screening          Plan:      Well woman exam with routine gynecological exam    Menopausal symptoms    Other orders  -     estrogens, conjugated, (PREMARIN) 0.9 MG Tab; Take 1 tablet (0.9 mg total) by mouth once daily.  Dispense: 30 tablet; Refill: 11    Colon Cancer Screening   Ambulatory referral Gastroenterology for screening colonoscopy    COUNSELING:  Sakina Coffey was counseled on A.C.O.G. Pap guidelines and  recommendations for yearly pelvic exams in addition to recommendations for yearly mammograms and monthly self breast exams. In addition she was counseled on adequate intake of calcium and vitamin D; to see her PCP for other health maintenance.

## 2018-05-11 ENCOUNTER — TELEPHONE (OUTPATIENT)
Dept: OBSTETRICS AND GYNECOLOGY | Facility: CLINIC | Age: 53
End: 2018-05-11

## 2018-05-11 ENCOUNTER — TELEPHONE (OUTPATIENT)
Dept: NEUROLOGY | Facility: CLINIC | Age: 53
End: 2018-05-11

## 2018-05-11 DIAGNOSIS — Z12.11 COLON CANCER SCREENING: Primary | ICD-10-CM

## 2018-05-11 NOTE — TELEPHONE ENCOUNTER
----- Message from Nishi Nagy sent at 2018  2:12 PM CDT -----  Contact: PATIENT  Sakina Barrios  MRN: [id number]  : 1965  PCP: Donovan Mendez  Home Phone      [phone]  Work Phone      Not on file.  Mobile          [phone]      MESSAGE: Patient would like to see if Dr. Zayas would consider increasing her Gabapentin, she states that she is still have pain all over.  She states that the physical therapy helps somewhat but not all the way.        Phone: [phone]

## 2018-05-11 NOTE — TELEPHONE ENCOUNTER
Patient is currently taking Gabapentin 100 mg TID, Per Ksenia last note she had sedation and speech issues with 300 mg tid dosing, but had more pain relief with this dose. Please advise.

## 2018-05-11 NOTE — TELEPHONE ENCOUNTER
Referral placed for Colorectal Surgery for Dr. Williamson. GI referral can not be used for Dr. Williamson. Appointment scheduled. Patient notified of appointment information per appointment tab. Patient verbalized understanding.

## 2018-05-11 NOTE — TELEPHONE ENCOUNTER
Can try 100mg in the am, 100mg at noon and 200mg night gabapentin unless side effect recur until seeing froilan again as scheduled

## 2018-05-15 DIAGNOSIS — R70.0 ELEVATED SED RATE: Primary | ICD-10-CM

## 2018-05-15 DIAGNOSIS — M79.10 MYALGIA: ICD-10-CM

## 2018-05-30 ENCOUNTER — INITIAL CONSULT (OUTPATIENT)
Dept: RHEUMATOLOGY | Facility: CLINIC | Age: 53
End: 2018-05-30
Payer: COMMERCIAL

## 2018-05-30 VITALS
SYSTOLIC BLOOD PRESSURE: 107 MMHG | WEIGHT: 154 LBS | BODY MASS INDEX: 28.63 KG/M2 | HEART RATE: 75 BPM | DIASTOLIC BLOOD PRESSURE: 79 MMHG

## 2018-05-30 DIAGNOSIS — R70.0 ESR RAISED: ICD-10-CM

## 2018-05-30 DIAGNOSIS — J34.89 SINUS PAIN: Primary | ICD-10-CM

## 2018-05-30 DIAGNOSIS — M79.7 FIBROMYALGIA: ICD-10-CM

## 2018-05-30 DIAGNOSIS — G47.9 SLEEP DISORDER: Primary | ICD-10-CM

## 2018-05-30 PROCEDURE — 3008F BODY MASS INDEX DOCD: CPT | Mod: CPTII,S$GLB,, | Performed by: INTERNAL MEDICINE

## 2018-05-30 PROCEDURE — 99999 PR PBB SHADOW E&M-EST. PATIENT-LVL IV: CPT | Mod: PBBFAC,,, | Performed by: INTERNAL MEDICINE

## 2018-05-30 PROCEDURE — 99204 OFFICE O/P NEW MOD 45 MIN: CPT | Mod: S$GLB,,, | Performed by: INTERNAL MEDICINE

## 2018-05-30 NOTE — PROGRESS NOTES
Chief Complaint   Patient presents with    Disease Management       Patient was referred by Marlene    History of presenting illness    53 year old female has been hurting in the bone and muscles for years    She has a diagnosis of fibromyalgia as per PCP    She does stretches,loosen the muscles in the back and shoulders at a PT program and all that helps  Chin tuck and belly tuck all help    Gabapentin helps with pain,takes 100 mg am,100 mg noon,200 mg bedtime  She forgets things,she is confused    Takes flexeril 10 mg tid    Anxiety and depression  Xanax and wellbutrin help her  She takes cymbalta and seroquel  She plays around with the doses    Sleep not good  Insomnia +  Probably has sleep apnea,she hasnt done the study    C spine : she has nml xrays  LS spine : disc bulge L3-4  Hips mild OA    Recent labs    High ESR 35/42  nml CBC,Mild anemia 11.6/35.8  LORRIE,RF negative  Vit d low  nml CMP,K was replaced   b12 high due to being on supplements    Has diarrhea alternating with constipation  Has sinus issues which seem worse recently  Says she has fluid in the ears  She has dry mouth from meds    No skin rashes,malar rash,photosensitivity  No psoriasis  No telangiectasias  No calcinosis  No patchy alopecia  No oral and nasal ulcers  No pleurisy or any cardiopulmonary complaints  No dysphagia,diplopia and dysphonia and muscle weakness  No n/v/d/c  No acid reflux+  No raynaud's+  No digital ulcers     No cytopenias except mild anemia  No renal issues    No blood clots     No fever,chills,night sweats,weight loss and loss of appetite     No pregnancy losses when younger    No neurologic issues except for memory issues and confusion since she started gabapentin    Past history : LS spine OA    Family history : mom had severe pain     Social history : current smoker       Review of Systems   Constitutional: Negative for activity change, appetite change, chills, diaphoresis, fatigue, fever and unexpected weight  change.   HENT: Negative for congestion, dental problem, drooling, ear discharge, ear pain, facial swelling, hearing loss, mouth sores, nosebleeds, postnasal drip, rhinorrhea, sinus pain, sinus pressure, sneezing, sore throat, tinnitus, trouble swallowing and voice change.    Eyes: Negative for photophobia, pain, discharge, redness, itching and visual disturbance.   Respiratory: Negative for apnea, cough, choking, chest tightness, shortness of breath, wheezing and stridor.    Cardiovascular: Negative for chest pain, palpitations and leg swelling.   Gastrointestinal: Negative for abdominal distention, abdominal pain, anal bleeding, blood in stool, constipation, diarrhea, nausea, rectal pain and vomiting.   Endocrine: Negative for cold intolerance, heat intolerance, polydipsia, polyphagia and polyuria.   Genitourinary: Negative for decreased urine volume, difficulty urinating, dysuria, enuresis, flank pain, frequency, genital sores, hematuria and urgency.   Musculoskeletal: Positive for myalgias and neck pain. Negative for arthralgias, back pain, gait problem, joint swelling and neck stiffness.   Skin: Negative for color change, pallor, rash and wound.   Allergic/Immunologic: Negative for environmental allergies, food allergies and immunocompromised state.   Neurological: Negative for dizziness, tremors, seizures, syncope, facial asymmetry, speech difficulty, weakness, light-headedness, numbness and headaches.   Hematological: Negative for adenopathy. Does not bruise/bleed easily.   Psychiatric/Behavioral: Negative for agitation, behavioral problems, confusion, decreased concentration, dysphoric mood, hallucinations, self-injury, sleep disturbance and suicidal ideas. The patient is not nervous/anxious and is not hyperactive.      Physical Exam     CARY-28 tender joint count: 0  CARY-28 swollen joint count: 0    Hyperalgesia noed+  Upper back and neck tender and she has painful ROM    Physical Exam   Constitutional: She  is oriented to person, place, and time and well-developed, well-nourished, and in no distress. No distress.   HENT:   Head: Normocephalic.   Mouth/Throat: Oropharynx is clear and moist.   Eyes: Conjunctivae are normal. Pupils are equal, round, and reactive to light. Right eye exhibits no discharge. Left eye exhibits no discharge. No scleral icterus.   Neck: Normal range of motion. No thyromegaly present.   Cardiovascular: Normal rate, regular rhythm, normal heart sounds and intact distal pulses.    Pulmonary/Chest: Effort normal and breath sounds normal. No stridor.   Abdominal: Soft. Bowel sounds are normal.   Lymphadenopathy:     She has no cervical adenopathy.   Neurological: She is alert and oriented to person, place, and time.   Skin: Skin is warm. No rash noted. She is not diaphoretic.     Psychiatric: Affect and judgment normal.   Musculoskeletal: Normal range of motion.         Assessment     53 year old female comes with    -overall body pain,she feels its deep in her bones and muscles for : many years  -she has anxiety and depression  -she feels confused and has memory issues  -she has insomnia and symptoms of sleep apnea but never tested    She has a diagnosis of fibromyalgia    Gabapentin 100 mg am,100 mg noon and 200 mg bedtime really helps her with the pain  She also takes flexeril 10 mg tid  She likes the benefiy but feels drugged    She sees psychiatry and takes wellbutrin,seroquel and xanax and cymbalta  She adjusts the doses without consulting her psychiatrist and family thinks she is severely depressed    She is currently into physical therapy which helps her    She is taking vitamin b12,folic acid,d and magnesium    She has elevated ESR  She has negative LORRIE,RF  She has no symptoms of an autoimmune illness  She also claims to be UTD with her cancer screening,nml mammos  She will be doing a colonoscopy soon  She has sinus issues which might explain the elevated inflammatory markers    She has LS  spine disease      1. Sleep disorder    2. Fibromyalgia    3. ESR raised        Reviewed labs/xrays  Reviewed medications    New problem     Plan    We talked about changing the way she takes her flexeril and gabapentin,dose reduce in the mornings higher at nights    Also talk to psychiatry and see if they can re adjust some of her psych meds    Water aerobics    Sleep study    Continue supplements     See ENT for the sinus issues  Colonoscopy as planned     If no source found for the elevated inflammatory markers,one other area where she is symptomatic : neck and upper back area,more imaging here?    Sakina Coffey was seen today for disease management.    Diagnoses and all orders for this visit:    Sleep disorder  -     Ambulatory consult to Sleep Disorders  -     Polysomnogram (CPAP will be added if patient meets diagnostic criteria.); Future    Fibromyalgia    ESR raised      rtc in 3 months     Quit smoking,weight loss

## 2018-05-30 NOTE — LETTER
May 30, 2018      Ksenia Rosario, NP  [address] [facility] [address]           [facility] - Rheumatology  [address]  [place] [address]  Phone: [phone]  Fax: [phone]          Patient: Sakina Barrios   MR Number: [id number]   YOB: 1965   Date of Visit: 5/30/2018       Dear Ksenia Rosario:    Thank you for referring Sakina Barrios to me for evaluation. Attached you will find relevant portions of my assessment and plan of care.    If you have questions, please do not hesitate to call me. I look forward to following Sakina Barrios along with you.    Sincerely,    Andrei Patel MD    Enclosure  CC:  No Recipients    If you would like to receive this communication electronically, please contact [email or web address] or [phone] to request more information on [employer] access.    For providers and/or their staff who would like to refer a patient to [facility], please contact us through our one-stop-shop provider referral line, [facility], at [phone].    If you feel you have received this communication in error or would no longer like to receive these types of communications, please e-mail [email or web address]

## 2018-06-07 DIAGNOSIS — G47.30 SLEEP APNEA, UNSPECIFIED TYPE: Primary | ICD-10-CM

## 2018-06-15 ENCOUNTER — OFFICE VISIT (OUTPATIENT)
Dept: NEUROLOGY | Facility: CLINIC | Age: 53
End: 2018-06-15
Payer: COMMERCIAL

## 2018-06-15 VITALS
WEIGHT: 153 LBS | HEART RATE: 84 BPM | SYSTOLIC BLOOD PRESSURE: 112 MMHG | RESPIRATION RATE: 16 BRPM | BODY MASS INDEX: 28.89 KG/M2 | HEIGHT: 61 IN | DIASTOLIC BLOOD PRESSURE: 78 MMHG

## 2018-06-15 DIAGNOSIS — M79.10 MYALGIA: ICD-10-CM

## 2018-06-15 DIAGNOSIS — R41.3 MEMORY LOSS: Primary | ICD-10-CM

## 2018-06-15 PROCEDURE — 99214 OFFICE O/P EST MOD 30 MIN: CPT | Mod: S$GLB,,, | Performed by: PSYCHIATRY & NEUROLOGY

## 2018-06-15 PROCEDURE — 3008F BODY MASS INDEX DOCD: CPT | Mod: CPTII,S$GLB,, | Performed by: PSYCHIATRY & NEUROLOGY

## 2018-06-15 PROCEDURE — 99999 PR PBB SHADOW E&M-EST. PATIENT-LVL IV: CPT | Mod: PBBFAC,,, | Performed by: PSYCHIATRY & NEUROLOGY

## 2018-06-15 NOTE — PROGRESS NOTES
"HPI:  Sakina Barrios is a 53 y.o. female with 5 months of memory loss. Anxiety prominent with other somatic symptoms like dizziness, visual changes without cause found.    Since the last visit,the patient saw Ksenia Rosario NP, after calling to state she did not need memory testing as memory was improved with improved anxiety and depression.  However, she complained of neck pain and sciatica pain.  She was given cervical TPI which helped.  Gabapentin was started for pain as well  Patient did attend PT and states this helps- she continues to attend.   Rheumaology note is reviewed/ sleep study is pending, and she is thought to have fibromyalgia.     She is currently taking 3 of the gabapentin 100mg capsules at night per rheumatology.  She reports diffuse pain.   Her back pain continues with "pain all over" in the hips, legs.   She states she is having trouble with concentration and family states "she is still confused." Family states patient  often forgets what she is staying. She has difficulty with multiple tasks at once.  She is driving well without accidents or incidents. Family states at times she looks dazed  She is currently taking multiple meds with psychiatry including Seroquel which is chronically used under the care of Dr So  She recently returned from a cruise in which she did not feel anxiety or stress but still have concentration challenges.   She is using Gabapentin and Flexeril at night to avoid sedation during the day.   Her headaches is chronic and related to all pain. She reports daily headache for years. She has some sleepiness.   She attending counseling    Review of Systems   Constitutional: Negative for fever.   HENT: Negative for nosebleeds.    Eyes: Negative for double vision.   Respiratory: Negative for hemoptysis.    Cardiovascular: Negative for leg swelling.   Gastrointestinal: Negative for blood in stool.   Genitourinary: Negative for hematuria.   Musculoskeletal: Negative for falls. "   Skin: Negative for itching.   Neurological: Negative for seizures.   Psychiatric/Behavioral: Positive for memory loss.         I have reviewed all of this patient's past medical and surgical histories as well as family and social histories and active allergies and medications as documented in the electronic medical record.        Exam:  Gen Appearance, well developed/nourished in no apparent distress  CV: 2+ distal pulses with no edema or swelling  Neuro:  MS: Awake, alert, oriented to place, person, time, situation. Sustains attention. Recent recall 2/3 at 3 minutes after hints only/remote memory intact, Language is full to spontaneous speech/repetition/naming/comprehension. Fund of Knowledge is full  There is some pyschomotor slowing and she appears anxious at times.   Names 2/2 presidents with anxiety interfering with effort  Draws clock well with mention that she could not do this for PCP  CN: Optic discs are flat with normal vasculature, PERRL, Extraoccular movements and visual fields are full. Normal facial sensation and strength, Hearing symmetric, Tongue and Palate are midline and strong. Shoulder Shrug symmetric and strong.  Motor: Normal bulk, tone, no abnormal movements. 5/5 strength bilateral upper/lower extremities with 2+ reflexes and not clonus  Sensory: symmetric to temp, and vibration,  Romberg negative  Cerebellar: Finger-nose,Heal-shin, Rapid alternating movements intact  Gait: Normal stance, no ataxia      MRI brain 10/2017: Atrophy but no acute changes  10/2017 B12/RPR normal    MRI L spine 3/2018: Minimal desiccation of the L3 disc with a questionable small left paracentral disc bulge at L3-L4.  No significant spinal canal or foraminal encroachment.    3/2018 C spine and hip xrays reviewed: Cervical spine, AP and lateral: Vertebral body alignment, heights and disc spaces are within normal limits.  The prevertebral soft tissues are normal.  No fracture or subluxation is seen    2018 CMP, CBC,  B12, LORRIE, RA factor ESR reviewed.  Patient was told to follow up with PCP for mildly low K and rheumatology for elevated ESR  Carotid US 10/2017:  No stenosis    Assessment/Plan: Sakina Barrios is a 53 y.o. female with 5 months of memory loss. Anxiety prominent with other somatic symptoms like dizziness, visual changes without cause found.  I recommend:     1. Very mild findings on bilateral hip X-rays and MRI L-spine, and C-spine X-rays were overall normal-nothing to explain her diffuse, ongoing MSK complaints, perhaps consistent with  fibromyalgia and she is being followed by rheumatology by this  2. Continue PT for myalgic complaints/ diffuse pain  3. She was referred to pain clinic prior. Will hold on this for now.  4.  She is pending sleep study with rheumatology. Don't drive when sleepy.   5. Continue compound cream for cervical complaints and repeat cervical TPI's PRN as this helped prior.   6. Continue Gabapentin to 300mg nightly as she could not tolerate higher doses.  She is also taking 60 mg Cymbalta per psychiatry.   7. Continue prn Flexeril for lumbar spasms- using nightly only to avoid daily sedation. Try off of flexeril to see if this improves her daytime drowsiness. Review polypharmacy with psychiatry suggested.   8. Memory loss resolved with management of her anxiety but is prominent again (she reports without anxiety)  Could have incidental Atrophy on MRI, but needs Neuropsychological testing   9. Chronic Migraine noted for years. 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 Will avoid adding meds given her complaint of sleepiness    RTC 3 months

## 2018-06-28 ENCOUNTER — TELEPHONE (OUTPATIENT)
Dept: SLEEP MEDICINE | Facility: OTHER | Age: 53
End: 2018-06-28

## 2018-06-28 ENCOUNTER — OFFICE VISIT (OUTPATIENT)
Dept: SLEEP MEDICINE | Facility: CLINIC | Age: 53
End: 2018-06-28
Payer: COMMERCIAL

## 2018-06-28 VITALS
HEIGHT: 61 IN | SYSTOLIC BLOOD PRESSURE: 120 MMHG | WEIGHT: 152.31 LBS | BODY MASS INDEX: 28.76 KG/M2 | HEART RATE: 86 BPM | DIASTOLIC BLOOD PRESSURE: 80 MMHG | OXYGEN SATURATION: 97 %

## 2018-06-28 DIAGNOSIS — G47.30 SLEEP APNEA, UNSPECIFIED TYPE: Primary | ICD-10-CM

## 2018-06-28 PROCEDURE — 3008F BODY MASS INDEX DOCD: CPT | Mod: CPTII,S$GLB,, | Performed by: NURSE PRACTITIONER

## 2018-06-28 PROCEDURE — 99203 OFFICE O/P NEW LOW 30 MIN: CPT | Mod: S$GLB,,, | Performed by: NURSE PRACTITIONER

## 2018-06-28 PROCEDURE — 99999 PR PBB SHADOW E&M-EST. PATIENT-LVL V: CPT | Mod: PBBFAC,,, | Performed by: NURSE PRACTITIONER

## 2018-06-28 NOTE — PATIENT INSTRUCTIONS
Call Sleep Lab to schedule home sleep study. Their number is [phone] (ext [phone]). [facility] is located on 7th floor of [facility].    Please send SAAD Robles NP message via [facility] email after completing overnight sleep study, so she can look out for results.     You are advised to abstain from driving should you feel sleepy or drowsy.

## 2018-06-28 NOTE — PROGRESS NOTES
Sakina Barrios  was seen as a new patient for the evaluation of obstructive sleep apnea.    CHIEF COMPLAINT:    Chief Complaint   Patient presents with    Snoring    Fatigue       06/28/2018 ZARIA Robles NP: Initial HISTORY OF PRESENT ILLNESS: Sakina Barrios is a 53 y.o. female is here for sleep evaluation.       Accompanied by friend today     Patient complaints include: snoring, daytime fatigue, interrupted sleep, daytime sleepiness, and memory impairment  Reports difficulty initiating sleep and staying asleep - per pt managed by psychiatrist    Memory impairment worse with Neurontin + Flexeril combo for pain management; since stopping Flexeril, memory issues improving     Under psyciatry care, including seroquel management     Of note PSG ordered denied by insurance, HST ordered by rheum already authorized by insurance but not yet scheduled.     Denies symptoms of restless legs or kicking during sleep.      EPWORTH SLEEPINESS SCALE 6/27/2018   Sitting and reading 1   Watching TV 1   Sitting, inactive in a public place (e.g. a theatre or a meeting) 1   As a passenger in a car for an hour without a break 1   Lying down to rest in the afternoon when circumstances permit 1   Sitting and talking to someone 0   Sitting quietly after a lunch without alcohol 1   In a car, while stopped for a few minutes in traffic 0   Total score 6       SLEEP ROUTINE:  Sleep Clinic New Patient 6/27/2018   What time do you go to bed on a week day? (Give a range) Between 11 pm - 1 am   What time do you go to bed on a day off? (Give a range) Between 11 pm - 1 am   How long does it take you to fall asleep? (Give a range) 1 hour   On average, how many times per night do you wake up? 1-2 times   How long does it take you to fall back into sleep? (Give a range) About 10 minutes   What time do you wake up to start your day on a week day? (Give a range) 7   What time do you wake up to start your day on a day off? (Give a range) 7   What time do  you get out of bed? (Give a range) 7:30   On average, how many hours do you sleep? 5-6   On average, how many naps do you take per day? None   Rate your sleep quality from 0 to 5 (0-poor, 5-great). 2   Have you experienced:  Weight gain?   How much weight have you lost or gained (in lbs.) in the last year?  Gained 15-20   On average, how many times per night do you go to the bathroom?  0   Have you ever had a sleep study/CPAP machine/surgery for sleep apnea? No   Have you ever had a CPAP machine for sleep apnea? No   Have you ever had surgery for sleep apnea? No          PAST MEDICAL HISTORY:    Active Ambulatory Problems     Diagnosis Date Noted    Menopausal symptoms 2012    Cervical myofascial pain syndrome 2018    Right sided sciatica 2018    Neck pain 2018    Lower back pain 2018    Bilateral hip pain 2018    Myalgia 2018    Cervical paraspinal muscle spasm 2018     Resolved Ambulatory Problems     Diagnosis Date Noted    No Resolved Ambulatory Problems     Past Medical History:   Diagnosis Date    Carpal tunnel syndrome     Hyperlipemia     Hypertension     Mental disorder                 PAST SURGICAL HISTORY:    Past Surgical History:   Procedure Laterality Date    CARPAL TUNNEL RELEASE      Bilateral     SECTION      x3    HYSTERECTOMY  approx 42 y/o    BHUMI BSO-pain    NASAL SINUS SURGERY           FAMILY HISTORY:                Family History   Problem Relation Age of Onset    Hypertension Father     Diabetes Father     Coronary artery disease Father     Hypertension Mother     Breast cancer Mother 72    Colon cancer Neg Hx     Ovarian cancer Neg Hx        SOCIAL HISTORY:          Tobacco:   History   Smoking Status    Current Every Day Smoker    Packs/day: 1.00    Years: 30.00   Smokeless Tobacco    Never Used       Alcohol use:    History   Alcohol Use No     Comment: No                 ALLERGIES:  Review of patient's  allergies indicates:  No Known Allergies    CURRENT MEDICATIONS:    Current Outpatient Prescriptions   Medication Sig Dispense Refill    albuterol (VENTOLIN HFA) 90 mcg/actuation inhaler Ventolin HFA 90 mcg/actuation aerosol inhaler      alprazolam (XANAX) 1 MG tablet 3 (three) times daily as needed.   0    aspirin 325 MG tablet Take 325 mg by mouth once daily.      buPROPion (WELLBUTRIN XL) 300 MG 24 hr tablet 300 mg once daily.   3    cholecalciferol, vitamin D3, (VITAMIN D3) 5,000 unit Tab Take 5,000 Units by mouth once daily.      CRESTOR 20 mg tablet Take 20 mg by mouth every evening.       cyclobenzaprine (FLEXERIL) 10 MG tablet Take 1 tablet (10 mg total) by mouth 3 (three) times daily as needed for Muscle spasms. 60 tablet 3    duloxetine (CYMBALTA) 60 MG capsule Take 60 mg by mouth once daily.        gabapentin (NEURONTIN) 100 MG capsule Take 1 capsule (100 mg total) by mouth 3 (three) times daily. 90 capsule 11    losartan-hydrochlorothiazide (HYZAAR) 50-12.5 mg per tablet Take 1 tablet by mouth once daily.        omeprazole (PRILOSEC) 40 MG capsule Take 1 capsule by mouth once daily.      potassium chloride (KLOR-CON) 10 MEQ TbSR potassium chloride ER 10 mEq tablet,extended release   Take 1 twice a day by oral route.      QUEtiapine (SEROQUEL) 100 MG Tab Take 200 mg by mouth every evening.       scopolamine (TRANSDERM-SCOP) 1.3-1.5 mg (1 mg over 3 days) Transderm-Scop 1.5 mg transdermal patch (1 mg over 3 days)   Apply behind ear 4 hours before event and change every 3 days      vitamin E 400 UNIT capsule Take 400 Units by mouth once daily.       No current facility-administered medications for this visit.                   REVIEW OF SYSTEMS:     Sleep related symptoms as per HPI.  Sleep Clinic ROS  6/27/2018   Difficulty breathing through the nose?  Sometimes   Sore throat or dry mouth in the morning? Yes   Irregular or very fast heart beat?  Sometimes   Shortness of breath?  No   Acid  "reflux? Yes   Body aches and pains?  Yes   Morning headaches? Yes   Dizziness? Sometimes   Mood changes?  Yes   Do you exercise?  Sometimes   Do you feel like moving your legs a lot?  Sometimes     Otherwise, a balance of systems reviewed is negative.          PHYSICAL EXAM:  Vitals:    06/28/18 1137   BP: 120/80   Pulse: 86   SpO2: 97%   Weight: 69.1 kg (152 lb 5.4 oz)   Height: 5' 1" (1.549 m)   PainSc:   4   PainLoc: Bone     Body mass index is 28.78 kg/m².     GENERAL: Overweight development, well groomed  HEENT:  Conjunctivae are non-erythematous; Pupils equal, round, and reactive to light; Nose is symmetrical; Nasal mucosa is pink and moist; Septum is midline; Inferior turbinates are normal; Nasal airflow is normal; Posterior pharynx is pink; Modified Mallampati: IV; Posterior palate is normal; Tonsils +1; Uvula is normal and pink;Tongue is normal; Dentition is edentulous, upper dentures, lower partials; No TMJ tenderness; Jaw opening and protrusion without click and without discomfort.  NECK: Supple. Neck circumference is 11.5 inches. No thyromegaly. No palpable nodes.    SKIN: On face and neck: No abrasions, no rashes, no lesions.  No subcutaneous nodules are palpable.  RESPIRATORY: Chest is clear to auscultation.  Normal chest expansion and non-labored breathing at rest.  CARDIOVASCULAR: Normal S1, S2.  No murmurs, gallops or rubs. No carotid bruits bilaterally.  EXTREMITIES: No edema. No clubbing. No cyanosis. Station normal. Gait normal.        NEURO/PSYCH: Oriented to time, place and person. Normal attention span and concentration. Affect is full. Mood is normal.                                              ASSESSMENT:    Sleep apnea, unspecified. The patient symptomatically has snoring, daytime fatigue, interrupted sleep, daytime sleepiness, and memory impairment with findings of crowded oral airway and elevated body mass index. Medical co-morbidities: systemic HTN, fibromyalgia, anxiety, GERD, and " obesity.  This warrants further investigation for possible obstructive sleep apnea.      Daytime sleepiness, suspected underlying ANTONIO compounded by medication effects (multiple sedating meds)     Insomnia, chronic, managed by psychiatrist     PLAN:    Diagnostic: HST. The nature of this procedure and its indication was discussed with the patient. Discussed with patient that if HST is negative or depending on severity of positive HST, in-lab sleep study may be necessary. Patient will be contacted after sleep study is done.  Schedule already authorized HST - pt to notify me after completing test since results will be resulted to rheum MD who ordered it. Call with results, RTC prn.     Education: During our discussion today, we talked about the etiology of obstructive sleep apnea as well as the potential ramifications of untreated sleep apnea, which could include daytime sleepiness, hypertension, heart disease and/or stroke. We discussed potential treatment options, which could include weight loss, body positioning, continuous positive airway pressure (CPAP), OA, EPAP, or referral for surgical consideration.     Precautions: The patient was advised to abstain from driving should they feel sleepy  or drowsy.     Thank you for allowing me the opportunity to participate in the care of your patient.

## 2018-07-05 ENCOUNTER — TELEPHONE (OUTPATIENT)
Dept: SLEEP MEDICINE | Facility: OTHER | Age: 53
End: 2018-07-05

## 2018-07-06 ENCOUNTER — HOSPITAL ENCOUNTER (OUTPATIENT)
Dept: SLEEP MEDICINE | Facility: OTHER | Age: 53
Discharge: HOME | End: 2018-07-06
Attending: INTERNAL MEDICINE
Payer: COMMERCIAL

## 2018-07-06 DIAGNOSIS — G47.30 SLEEP APNEA, UNSPECIFIED TYPE: ICD-10-CM

## 2018-07-06 PROCEDURE — 95800 SLP STDY UNATTENDED: CPT | Mod: 26,,, | Performed by: PSYCHIATRY & NEUROLOGY

## 2018-07-06 PROCEDURE — 95800 SLP STDY UNATTENDED: CPT

## 2018-07-07 ENCOUNTER — PATIENT MESSAGE (OUTPATIENT)
Dept: SLEEP MEDICINE | Facility: CLINIC | Age: 53
End: 2018-07-07

## 2018-07-31 ENCOUNTER — OFFICE VISIT (OUTPATIENT)
Dept: NEUROLOGY | Facility: CLINIC | Age: 53
End: 2018-07-31
Payer: COMMERCIAL

## 2018-07-31 VITALS
DIASTOLIC BLOOD PRESSURE: 76 MMHG | SYSTOLIC BLOOD PRESSURE: 98 MMHG | BODY MASS INDEX: 28.7 KG/M2 | WEIGHT: 152 LBS | RESPIRATION RATE: 16 BRPM | HEIGHT: 61 IN | HEART RATE: 88 BPM

## 2018-07-31 DIAGNOSIS — M79.18 CERVICAL MYOFASCIAL PAIN SYNDROME: Primary | ICD-10-CM

## 2018-07-31 DIAGNOSIS — F41.9 ANXIETY AND DEPRESSION: ICD-10-CM

## 2018-07-31 DIAGNOSIS — F32.A ANXIETY AND DEPRESSION: ICD-10-CM

## 2018-07-31 DIAGNOSIS — M54.2 NECK PAIN: ICD-10-CM

## 2018-07-31 DIAGNOSIS — M79.10 MYALGIA: ICD-10-CM

## 2018-07-31 DIAGNOSIS — M54.5 LOW BACK PAIN, UNSPECIFIED BACK PAIN LATERALITY, UNSPECIFIED CHRONICITY, WITH SCIATICA PRESENCE UNSPECIFIED: ICD-10-CM

## 2018-07-31 DIAGNOSIS — M25.551 BILATERAL HIP PAIN: ICD-10-CM

## 2018-07-31 DIAGNOSIS — M25.552 BILATERAL HIP PAIN: ICD-10-CM

## 2018-07-31 DIAGNOSIS — M54.31 RIGHT SIDED SCIATICA: ICD-10-CM

## 2018-07-31 DIAGNOSIS — M79.7 FIBROMYALGIA: ICD-10-CM

## 2018-07-31 DIAGNOSIS — R41.3 MEMORY LOSS: ICD-10-CM

## 2018-07-31 DIAGNOSIS — G47.30 SLEEP APNEA, UNSPECIFIED TYPE: ICD-10-CM

## 2018-07-31 DIAGNOSIS — M62.838 CERVICAL PARASPINAL MUSCLE SPASM: ICD-10-CM

## 2018-07-31 PROCEDURE — 3008F BODY MASS INDEX DOCD: CPT | Mod: CPTII,S$GLB,, | Performed by: NURSE PRACTITIONER

## 2018-07-31 PROCEDURE — 99214 OFFICE O/P EST MOD 30 MIN: CPT | Mod: S$GLB,,, | Performed by: NURSE PRACTITIONER

## 2018-07-31 PROCEDURE — 99999 PR PBB SHADOW E&M-EST. PATIENT-LVL IV: CPT | Mod: PBBFAC,,, | Performed by: NURSE PRACTITIONER

## 2018-07-31 RX ORDER — POTASSIUM CHLORIDE 750 MG/1
20 TABLET, EXTENDED RELEASE ORAL 2 TIMES DAILY
Refills: 3 | COMMUNITY
Start: 2018-06-30

## 2018-07-31 RX ORDER — DULOXETIN HYDROCHLORIDE 30 MG/1
60 CAPSULE, DELAYED RELEASE ORAL DAILY
Refills: 3 | COMMUNITY
Start: 2018-07-18 | End: 2018-11-14 | Stop reason: DRUGHIGH

## 2018-07-31 RX ORDER — GABAPENTIN 300 MG/1
300 CAPSULE ORAL 3 TIMES DAILY
Qty: 90 CAPSULE | Refills: 11 | Status: SHIPPED | OUTPATIENT
Start: 2018-07-31 | End: 2018-11-14 | Stop reason: SDUPTHER

## 2018-07-31 NOTE — PROGRESS NOTES
HPI:  Sakina Barrios is a 53 y.o. female with 5 months of memory loss. Anxiety prominent with other somatic symptoms like dizziness, visual changes without cause found.    She presents today for a follow up visit. She has increased anxiety at her last visit with increased memory loss at her last visit, which occurred 1 week after her brother's death. Her anxiety and memory are much improved now per patient. She also stopped taking Gabapentin and Flexeril together, which has improved her memory and daytime fatigue. She completed a home sleep study per Rheumatology, but never received her results.     Myalgic complaints are improved, though they continue. PT has been helpful for this. Pain was controlled better on Gabapentin 300 mg tid, though she could not tolerate taking this with Flexeril. She does take  Gabapetnin 300 mg during the day at times, and tolerates this well.     She has increased left neck pain at this time and would like to schedule a TPI.     Headaches improved lately.     Review of Systems   Constitutional: Negative for fever.   HENT: Negative for nosebleeds.    Eyes: Negative for double vision.   Respiratory: Negative for hemoptysis.    Cardiovascular: Negative for leg swelling.   Gastrointestinal: Negative for blood in stool.   Genitourinary: Negative for hematuria.   Musculoskeletal: Positive for back pain, joint pain, myalgias and neck pain. Negative for falls.   Skin: Negative for itching.   Neurological: Negative for seizures and headaches.   Psychiatric/Behavioral: Positive for memory loss.       I have reviewed all of this patient's past medical and surgical histories as well as family and social histories and active allergies and medications as documented in the electronic medical record.    Exam:  Gen Appearance, well developed/nourished in no apparent distress  CV: 2+ distal pulses with no edema or swelling  Neuro:  MS: Awake, alert, oriented to place, person, time, situation. Sustains  attention. Recent recall 2/3 at 3 minutes after hints only/remote memory intact, Language is full to spontaneous speech/repetition/naming/comprehension. Fund of Knowledge is full  There is some pyschomotor slowing and she appears anxious at times.   Names 2/2 presidents with anxiety interfering with effort  Draws clock well with mention that she could not do this for PCP  CN: Optic discs are flat with normal vasculature, PERRL, Extraoccular movements and visual fields are full. Normal facial sensation and strength, Hearing symmetric, Tongue and Palate are midline and strong. Shoulder Shrug symmetric and strong.  Motor: Normal bulk, tone, no abnormal movements. 5/5 strength bilateral upper/lower extremities with 2+ reflexes and not clonus  Sensory: symmetric to temp, and vibration,  Romberg negative  Cerebellar: Finger-nose,Heal-shin, Rapid alternating movements intact  Gait: Normal stance, no ataxia  MSK Survey: left trapezius tenderness    Imaging:   MRI brain 10/2017: Atrophy but no acute changes  10/2017 B12/RPR normal    MRI L spine 3/2018: Minimal desiccation of the L3 disc with a questionable small left paracentral disc bulge at L3-L4.  No significant spinal canal or foraminal encroachment.    3/2018 C spine and hip xrays reviewed: Cervical spine, AP and lateral: Vertebral body alignment, heights and disc spaces are within normal limits.  The prevertebral soft tissues are normal.  No fracture or subluxation is seen    2018 CMP, CBC, B12, LORRIE, RA factor ESR reviewed.  Patient was told to follow up with PCP for mildly low K and rheumatology for elevated ESR    Carotid US 10/2017:  No stenosis    Assessment/Plan: Sakina Barrios is a 53 y.o. female with 5 months of memory loss. Anxiety prominent with other somatic symptoms like dizziness, visual changes without cause found.    I recommend:   1. Increase Gabapentin back to 300 mg tid, as she has greater pain relief with this. Prior sedation attributed to  concurrent use of Flexeril per patient. No longer taking Flexeril. She is also taking Cymbalta 60 mg per Psychiatry, though this has no effect on her pain per patient.   2. Order cervical TPI's for increased L>R cervical complaints.   3. Very mild findings on bilateral hip X-rays and MRI L-spine, and C-spine X-rays were overall normal-nothing to explain her diffuse, ongoing MSK complaints, perhaps consistent with fibromyalgia, and she is being followed by rheumatology by this.  4. Continue PT for myalgic complaints/ diffuse pain. She was referred to pain clinic prior. Will hold on this for now.  5. Sleep study results per Rheumatology have not been uploaded yet. Polypharmacy with Gabapentin and Flexeril suspected to be contributing to her excessive daytime sleepiness.   6. Continue compound cream for cervical complaints and repeat cervical TPI's PRN as this helped prior. Sedation with Flexeril.   7. Memory loss resolved with management of her anxiety. Could have incidental Atrophy on MRI, but should have Neuropsychological testing for baseline at this point. I will refer her to [facility] at this time for testing. Anxiety worsened at last visit after death of her brother, though anxiety has resolved again.   8. Chronic Migraine noted for years. 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 Will avoid adding meds given her complaint of sleepiness    RTC 3 months

## 2018-08-03 ENCOUNTER — TELEPHONE (OUTPATIENT)
Dept: NEUROLOGY | Facility: CLINIC | Age: 53
End: 2018-08-03

## 2018-08-03 NOTE — TELEPHONE ENCOUNTER
Neuropsychology referral faxed to RIGOBERTO Fishman, PhD office on 08/01/2018, along with demographics, medication list and recent clinical note.    Fax number: [phone]

## 2018-08-07 ENCOUNTER — PATIENT MESSAGE (OUTPATIENT)
Dept: SLEEP MEDICINE | Facility: CLINIC | Age: 53
End: 2018-08-07

## 2018-08-08 ENCOUNTER — PATIENT MESSAGE (OUTPATIENT)
Dept: SLEEP MEDICINE | Facility: CLINIC | Age: 53
End: 2018-08-08

## 2018-08-08 DIAGNOSIS — G47.30 SLEEP APNEA, UNSPECIFIED TYPE: Primary | ICD-10-CM

## 2018-08-08 NOTE — TELEPHONE ENCOUNTER
07/06/2018  lb. The overall AHI was 4 and overall RDI was 10. The oxygen dante was 82.5% and % time < 90% SpO2 was 2.8%.    Results inconclusive.   Pt emailed  Recommended PSG for definitive diagnosis; awaiting reply from pt whether to proceed with order.

## 2018-08-30 ENCOUNTER — PATIENT MESSAGE (OUTPATIENT)
Dept: SLEEP MEDICINE | Facility: CLINIC | Age: 53
End: 2018-08-30

## 2018-08-30 ENCOUNTER — OFFICE VISIT (OUTPATIENT)
Dept: RHEUMATOLOGY | Facility: CLINIC | Age: 53
End: 2018-08-30
Payer: COMMERCIAL

## 2018-08-30 VITALS
BODY MASS INDEX: 28.7 KG/M2 | SYSTOLIC BLOOD PRESSURE: 116 MMHG | HEART RATE: 79 BPM | WEIGHT: 152 LBS | HEIGHT: 61 IN | DIASTOLIC BLOOD PRESSURE: 75 MMHG

## 2018-08-30 DIAGNOSIS — M79.7 FIBROMYALGIA: Primary | ICD-10-CM

## 2018-08-30 PROCEDURE — 99999 PR PBB SHADOW E&M-EST. PATIENT-LVL III: CPT | Mod: PBBFAC,,, | Performed by: INTERNAL MEDICINE

## 2018-08-30 PROCEDURE — 3008F BODY MASS INDEX DOCD: CPT | Mod: CPTII,S$GLB,, | Performed by: INTERNAL MEDICINE

## 2018-08-30 PROCEDURE — 99214 OFFICE O/P EST MOD 30 MIN: CPT | Mod: S$GLB,,, | Performed by: INTERNAL MEDICINE

## 2018-08-30 RX ORDER — GABAPENTIN 250 MG/5ML
SOLUTION ORAL
COMMUNITY
End: 2018-11-14 | Stop reason: SDUPTHER

## 2018-08-30 RX ORDER — POTASSIUM CHLORIDE 750 MG/1
TABLET, EXTENDED RELEASE ORAL
COMMUNITY
End: 2018-11-14 | Stop reason: SDUPTHER

## 2018-08-30 RX ORDER — QUETIAPINE FUMARATE 100 MG/1
TABLET, FILM COATED ORAL
COMMUNITY
End: 2018-11-14 | Stop reason: SDUPTHER

## 2018-08-30 RX ORDER — KETOROLAC TROMETHAMINE 10 MG/1
TABLET, FILM COATED ORAL
COMMUNITY
End: 2018-11-14

## 2018-08-30 RX ORDER — DULOXETIN HYDROCHLORIDE 60 MG/1
60 CAPSULE, DELAYED RELEASE ORAL EVERY MORNING
Refills: 3 | COMMUNITY
Start: 2018-08-17

## 2018-08-30 RX ORDER — ASPIRIN 325 MG
TABLET ORAL
COMMUNITY
End: 2018-11-14 | Stop reason: SDUPTHER

## 2018-08-30 RX ORDER — ROSUVASTATIN CALCIUM 20 MG/1
TABLET, COATED ORAL
COMMUNITY
End: 2019-01-21 | Stop reason: SDUPTHER

## 2018-08-30 NOTE — PROGRESS NOTES
Chief Complaint   Patient presents with    Disease Management       Patient with fibromyalgia for a follow up    History of presenting illness    53 year old female is here : doing extremely well today    She has a diagnosis of fibromyalgia as per PCP    She is     Off flexeril  On gabapentin 300 mg bid     Off xanax     Brother passed away in June     Not depressed anymore    Weather changes make her hurt more    Physical therapy and dry needling helped her a lot    Did sleep study : not conclusive,so she will do another at the hospital    She does stretches,loosen the muscles in the back and shoulders at a PT program and all that helps  Chin tuck and belly tuck all help  She does a lot of home PT    Memory improved a lot     Anxiety and depression better  Off wellbutrin   She takes cymbalta and seroquel    Sleeping better     C spine : she has nml xrays  LS spine : disc bulge L3-4  Hips mild OA    Labs     High ESR 35/42  nml CBC,Mild anemia 11.6/35.8  LORRIE,RF negative  Vit d low  nml CMP,K was replaced   b12 high due to being on supplements    Has diarrhea alternating with constipation  Sinuses are better  She has dry mouth from meds    No skin rashes,malar rash,photosensitivity  No psoriasis  No telangiectasias  No calcinosis  No patchy alopecia  No oral and nasal ulcers  No pleurisy or any cardiopulmonary complaints  No dysphagia,diplopia and dysphonia and muscle weakness  No n/v/d/c  No acid reflux+  No raynaud's+  No digital ulcers     No cytopenias except mild anemia  No renal issues    No blood clots     No fever,chills,night sweats,weight loss and loss of appetite     No pregnancy losses when younger    No neurologic issues except for memory issues and confusion since she started gabapentin    Past history : LS spine OA    Family history : mom had severe pain     Social history : current smoker       Review of Systems   Constitutional: Negative for activity change, appetite change, chills, diaphoresis,  fatigue, fever and unexpected weight change.   HENT: Negative for congestion, dental problem, drooling, ear discharge, ear pain, facial swelling, hearing loss, mouth sores, nosebleeds, postnasal drip, rhinorrhea, sinus pressure, sinus pain, sneezing, sore throat, tinnitus, trouble swallowing and voice change.    Eyes: Negative for photophobia, pain, discharge, redness, itching and visual disturbance.   Respiratory: Negative for apnea, cough, choking, chest tightness, shortness of breath, wheezing and stridor.    Cardiovascular: Negative for chest pain, palpitations and leg swelling.   Gastrointestinal: Negative for abdominal distention, abdominal pain, anal bleeding, blood in stool, constipation, diarrhea, nausea, rectal pain and vomiting.   Endocrine: Negative for cold intolerance, heat intolerance, polydipsia, polyphagia and polyuria.   Genitourinary: Negative for decreased urine volume, difficulty urinating, dysuria, enuresis, flank pain, frequency, genital sores, hematuria and urgency.   Musculoskeletal: Positive for myalgias and neck pain. Negative for arthralgias, back pain, gait problem, joint swelling and neck stiffness.   Skin: Negative for color change, pallor, rash and wound.   Allergic/Immunologic: Negative for environmental allergies, food allergies and immunocompromised state.   Neurological: Negative for dizziness, tremors, seizures, syncope, facial asymmetry, speech difficulty, weakness, light-headedness, numbness and headaches.   Hematological: Negative for adenopathy. Does not bruise/bleed easily.   Psychiatric/Behavioral: Negative for agitation, behavioral problems, confusion, decreased concentration, dysphoric mood, hallucinations, self-injury, sleep disturbance and suicidal ideas. The patient is not nervous/anxious and is not hyperactive.      Physical Exam     CARY-28 tender joint count: 0  CARY-28 swollen joint count: 0    Hyperalgesia noed+    Physical Exam   Constitutional: She is oriented to  person, place, and time and well-developed, well-nourished, and in no distress. No distress.   HENT:   Head: Normocephalic.   Mouth/Throat: Oropharynx is clear and moist.   Eyes: Conjunctivae are normal. Pupils are equal, round, and reactive to light. Right eye exhibits no discharge. Left eye exhibits no discharge. No scleral icterus.   Neck: Normal range of motion. No thyromegaly present.   Cardiovascular: Normal rate, regular rhythm, normal heart sounds and intact distal pulses.    Pulmonary/Chest: Effort normal and breath sounds normal. No stridor.   Abdominal: Soft. Bowel sounds are normal.   Lymphadenopathy:     She has no cervical adenopathy.   Neurological: She is alert and oriented to person, place, and time.   Skin: Skin is warm. No rash noted. She is not diaphoretic.     Psychiatric: Affect and judgment normal.   Musculoskeletal: Normal range of motion.         Assessment     53 year old female has fibromyalgia   Overall body pain  Anxiety and depression  Confusion/memory  Insomnia     She has a diagnosis of fibromyalgia    On gabapentin 300 mg bid    She sees psychiatry and takes seroquel and cymbalta    Physical therapy did the trick    She is off many medications    No mood symptoms,sleep issues but will follow up sleep study    She is taking folic acid,d and magnesium    She has elevated ESR  She has negative LORRIE,RF  She has no symptoms of an autoimmune illness  She also claims to be UTD with her cancer screening,nml mammos    Sinuses are better     She has LS spine disease      1. Fibromyalgia        Reviewed labs/xrays  Reviewed medications     f/u    Plan    Continue gabapentin 300 mg bid    Sleep study f/u  Psychiatry f/u    Continue supplements     Colonoscopy she will be UTD soon    D/c her and if she has any concerns she will call us    Sakina was seen today for disease management.    Diagnoses and all orders for this visit:    Fibromyalgia        Quit smoking,weight loss

## 2018-08-31 ENCOUNTER — TELEPHONE (OUTPATIENT)
Dept: SLEEP MEDICINE | Facility: OTHER | Age: 53
End: 2018-08-31

## 2018-09-10 ENCOUNTER — TELEPHONE (OUTPATIENT)
Dept: SLEEP MEDICINE | Facility: OTHER | Age: 53
End: 2018-09-10

## 2018-09-28 ENCOUNTER — TELEPHONE (OUTPATIENT)
Dept: SLEEP MEDICINE | Facility: OTHER | Age: 53
End: 2018-09-28

## 2018-10-12 ENCOUNTER — TELEPHONE (OUTPATIENT)
Dept: SLEEP MEDICINE | Facility: OTHER | Age: 53
End: 2018-10-12

## 2018-10-12 ENCOUNTER — TELEPHONE (OUTPATIENT)
Dept: SLEEP MEDICINE | Facility: CLINIC | Age: 53
End: 2018-10-12

## 2018-10-12 NOTE — TELEPHONE ENCOUNTER
----- Message from Sowmya Beard sent at 10/12/2018  9:53 AM CDT -----  Contact: giovanna  Name of Who is Calling:giovanna       What is the request in detail: Patient is requesting a call back to schedule a sleep study       Can the clinic reply by [facility]: no      What Number [facility]: [phone]

## 2018-10-16 ENCOUNTER — PATIENT MESSAGE (OUTPATIENT)
Dept: SURGERY | Facility: CLINIC | Age: 53
End: 2018-10-16

## 2018-10-17 ENCOUNTER — PATIENT MESSAGE (OUTPATIENT)
Dept: SURGERY | Facility: CLINIC | Age: 53
End: 2018-10-17

## 2018-10-17 DIAGNOSIS — Z12.11 ENCOUNTER FOR SCREENING COLONOSCOPY: Primary | ICD-10-CM

## 2018-10-29 ENCOUNTER — TELEPHONE (OUTPATIENT)
Dept: SLEEP MEDICINE | Facility: OTHER | Age: 53
End: 2018-10-29

## 2018-10-29 ENCOUNTER — HOSPITAL ENCOUNTER (OUTPATIENT)
Dept: SLEEP MEDICINE | Facility: OTHER | Age: 53
Discharge: HOME | End: 2018-10-29
Attending: NURSE PRACTITIONER
Payer: COMMERCIAL

## 2018-10-29 DIAGNOSIS — G47.33 OSA (OBSTRUCTIVE SLEEP APNEA): ICD-10-CM

## 2018-10-29 DIAGNOSIS — G47.30 SLEEP APNEA, UNSPECIFIED TYPE: ICD-10-CM

## 2018-10-29 PROCEDURE — 95810 POLYSOM 6/> YRS 4/> PARAM: CPT

## 2018-10-29 PROCEDURE — 95810 POLYSOM 6/> YRS 4/> PARAM: CPT | Mod: 26,,, | Performed by: INTERNAL MEDICINE

## 2018-10-30 NOTE — PROGRESS NOTES
A PSG was preformed on Sakina Barrios on the night of 10/29/18. The procedure was explained to the patient, all questions were asked and answered prior to the start of the study. The patient did not meet split night criteria.     A number of sleep disordered breathing events were seen during the night. more so while lying in the supine position. Snoring was noted to be mild to moderate. PLM's were associated with poistion changes and arousals. THe EKG appeared to have shown NSR with PVC's. Th e lowest spo2 seen appeared to have been 84%. All sleep stages appeared to have been reached. An end of the night instuction sheet was giving to the patient upon leaving the lab.

## 2018-11-05 ENCOUNTER — TELEPHONE (OUTPATIENT)
Dept: SLEEP MEDICINE | Facility: CLINIC | Age: 53
End: 2018-11-05

## 2018-11-05 NOTE — TELEPHONE ENCOUNTER
Please notify pt that 10/29/2018 in-lab sleep study consistent with obstructive sleep apnea. Schedule for follow-up to discuss results in detail as well as potential treatment options.

## 2018-11-14 ENCOUNTER — TELEPHONE (OUTPATIENT)
Dept: SLEEP MEDICINE | Facility: CLINIC | Age: 53
End: 2018-11-14

## 2018-11-14 ENCOUNTER — OFFICE VISIT (OUTPATIENT)
Dept: NEUROLOGY | Facility: CLINIC | Age: 53
End: 2018-11-14
Payer: COMMERCIAL

## 2018-11-14 ENCOUNTER — PATIENT MESSAGE (OUTPATIENT)
Dept: SLEEP MEDICINE | Facility: CLINIC | Age: 53
End: 2018-11-14

## 2018-11-14 VITALS
SYSTOLIC BLOOD PRESSURE: 110 MMHG | WEIGHT: 152.13 LBS | RESPIRATION RATE: 14 BRPM | DIASTOLIC BLOOD PRESSURE: 86 MMHG | HEART RATE: 87 BPM | BODY MASS INDEX: 27.99 KG/M2 | HEIGHT: 62 IN

## 2018-11-14 DIAGNOSIS — M62.838 CERVICAL PARASPINAL MUSCLE SPASM: ICD-10-CM

## 2018-11-14 DIAGNOSIS — G47.33 OSA (OBSTRUCTIVE SLEEP APNEA): ICD-10-CM

## 2018-11-14 DIAGNOSIS — R41.3 MEMORY LOSS: Primary | ICD-10-CM

## 2018-11-14 DIAGNOSIS — M79.7 FIBROMYALGIA: ICD-10-CM

## 2018-11-14 DIAGNOSIS — M54.2 NECK PAIN: ICD-10-CM

## 2018-11-14 DIAGNOSIS — M54.31 RIGHT SIDED SCIATICA: ICD-10-CM

## 2018-11-14 DIAGNOSIS — Z79.899 ENCOUNTER FOR LONG-TERM (CURRENT) USE OF MEDICATIONS: ICD-10-CM

## 2018-11-14 DIAGNOSIS — F41.9 ANXIETY AND DEPRESSION: ICD-10-CM

## 2018-11-14 DIAGNOSIS — I10 HYPERTENSION, UNSPECIFIED TYPE: ICD-10-CM

## 2018-11-14 DIAGNOSIS — F32.A ANXIETY AND DEPRESSION: ICD-10-CM

## 2018-11-14 DIAGNOSIS — E78.5 HYPERLIPIDEMIA, UNSPECIFIED HYPERLIPIDEMIA TYPE: ICD-10-CM

## 2018-11-14 DIAGNOSIS — M54.5 LOW BACK PAIN, UNSPECIFIED BACK PAIN LATERALITY, UNSPECIFIED CHRONICITY, WITH SCIATICA PRESENCE UNSPECIFIED: ICD-10-CM

## 2018-11-14 PROCEDURE — 99214 OFFICE O/P EST MOD 30 MIN: CPT | Mod: S$GLB,,, | Performed by: NURSE PRACTITIONER

## 2018-11-14 PROCEDURE — 3008F BODY MASS INDEX DOCD: CPT | Mod: CPTII,S$GLB,, | Performed by: NURSE PRACTITIONER

## 2018-11-14 PROCEDURE — 99999 PR PBB SHADOW E&M-EST. PATIENT-LVL IV: CPT | Mod: PBBFAC,,, | Performed by: NURSE PRACTITIONER

## 2018-11-14 RX ORDER — GABAPENTIN 300 MG/1
300 CAPSULE ORAL 3 TIMES DAILY
Qty: 90 CAPSULE | Refills: 11 | Status: SHIPPED | OUTPATIENT
Start: 2018-11-14 | End: 2019-12-12 | Stop reason: SDUPTHER

## 2018-11-14 NOTE — PATIENT INSTRUCTIONS
You can call clinic if you have increased neck pain, and would like to schedule trigger point injections, or if you would like an order to repeat physical therapy.

## 2018-11-14 NOTE — PROGRESS NOTES
HPI:  Sakina Barrios is a 53 y.o. female with cervical and lumbar complaints, as well as fibromyalgia, per Rheumatology. Prior complaint of memory loss. Anxiety prominent with other somatic symptoms like dizziness, visual changes without cause found. She has HTN and HLD, as well as ANTONIO. She is a current every day smoker.     She presents today for a follow up visit. She was referred to Neuropsych testing at her last visit, but this was never completed, as her memory loss resolved, after her anxiety and depression became managed per Psychiatry/Dr. Alexis. She is able to babysit multiple children with no problems.     She completed PT for her cervical and lumbar complaints, which are currently resolved. She also received dry needling, which was quite helpful.     MSK complaints do occur with weather changes, but no myalgic complaints at this time. She continues with PT exercises at home. Gabapentin continues to be helpful. This was increased at her last visit with me.     She completed a sleep study recently, which did moderate ANTONIO. She has not yet received her CPAP device.     Denies headache at this time.     Denies dizziness or visual complaints.     Review of Systems   Constitutional: Negative for fever.   HENT: Negative for nosebleeds.    Eyes: Negative for blurred vision and double vision.   Respiratory: Negative for hemoptysis.    Cardiovascular: Negative for leg swelling.   Gastrointestinal: Negative for blood in stool.   Genitourinary: Negative for hematuria.   Musculoskeletal: Positive for back pain. Negative for falls, joint pain, myalgias and neck pain.   Skin: Negative for itching.   Neurological: Negative for dizziness, tingling, seizures and headaches.   Psychiatric/Behavioral: Negative for depression and memory loss. The patient is not nervous/anxious.        I have reviewed all of this patient's past medical and surgical histories as well as family and social histories and active allergies and  medications as documented in the electronic medical record.    Exam:  Gen Appearance, well developed/nourished in no apparent distress  CV: 2+ distal pulses with no edema or swelling  Neuro:  MS: Awake, alert, oriented to place, person, time, situation. Sustains attention. Recent recall is intact/remote memory intact, Language is full to spontaneous speech/repetition/naming/comprehension. Fund of Knowledge is full. No psychomotor slowing today.   Draws clock well.  CN: Optic discs are flat with normal vasculature, PERRL, Extraoccular movements and visual fields are full. Normal facial sensation and strength, Hearing symmetric, Tongue and Palate are midline and strong. Shoulder Shrug symmetric and strong.  Motor: Normal bulk, tone, no abnormal movements. 5/5 strength bilateral upper/lower extremities with 2+ reflexes and not clonus  Sensory: symmetric to temp, and vibration,  Romberg negative  Cerebellar: Finger-nose,Heal-shin, Rapid alternating movements intact  Gait: Normal stance, no ataxia    Imaging:   MRI brain 10/2017: Atrophy but no acute changes    10/2017 B12/RPR normal    MRI L spine 3/2018: Minimal desiccation of the L3 disc with a questionable small left paracentral disc bulge at L3-L4.  No significant spinal canal or foraminal encroachment.    3/2018 C spine and hip xrays reviewed: Cervical spine, AP and lateral: Vertebral body alignment, heights and disc spaces are within normal limits.  The prevertebral soft tissues are normal.  No fracture or subluxation is seen    Carotid US 10/2017:  No stenosis    2018 CMP, CBC, B12, LORRIE, RA factor ESR reviewed.  Patient was told to follow up with PCP for mildly low K and rheumatology for elevated ESR    Assessment/Plan:Sakina Barrios is a 53 y.o. female with cervical and lumbar complaints, and fibromyalgia, diagnosed by Rheumatology. Prior complaint of memory loss. Anxiety prominent with other somatic symptoms like dizziness, visual changes without cause found.  She has HTN and HLD, as well as ANTONIO. She is a current every day smoker.      I recommend:   1. Continue Gabapentin 300 mg tid, as she has greater pain relief with this. Obtain recent labs per PCP, regarding renal function. Prior sedation attributed to concurrent use of Flexeril per patient. No longer taking Flexeril. 2. She is also taking Cymbalta 60 mg per Psychiatry, though this has no effect on her pain per patient.   2. May repeat cervical TPI's with increased neck pain.   3. Very mild findings on bilateral hip X-rays and MRI L-spine, and C-spine X-rays were overall normal-nothing to explain her diffuse, ongoing MSK complaints, suggestive of fibromyalgia, diagnosed by Rheumatology.  4. Repeat PT prn myalgic complaints, neck pain, or lumbar complaints. She was referred to pain clinic prior.   5. She was recently diagnosed with ANTONIO, but has not yet obtained CPAP. Encouraged compliance to reduce vascular risks and cognitive risks.   6. Memory loss resolved with resolution of anxiety and mood issues. She is functioning well and does not require neuropsych testing at this time. Could have incidental Atrophy on MRI. Will repeat order for NP testing with return of her memory loss.   7. Continue compound cream for cervical complaints.   8. Chronic Migraine noted for years-resolved currently. 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 Will avoid adding meds given her complaint of sleepiness.     RTC 1 year

## 2018-11-15 ENCOUNTER — OFFICE VISIT (OUTPATIENT)
Dept: SLEEP MEDICINE | Facility: CLINIC | Age: 53
End: 2018-11-15
Payer: COMMERCIAL

## 2018-11-15 VITALS
BODY MASS INDEX: 27.99 KG/M2 | HEART RATE: 88 BPM | SYSTOLIC BLOOD PRESSURE: 108 MMHG | DIASTOLIC BLOOD PRESSURE: 81 MMHG | WEIGHT: 152.13 LBS | HEIGHT: 62 IN

## 2018-11-15 DIAGNOSIS — G47.33 OSA (OBSTRUCTIVE SLEEP APNEA): Primary | ICD-10-CM

## 2018-11-15 PROCEDURE — 99214 OFFICE O/P EST MOD 30 MIN: CPT | Mod: S$GLB,,, | Performed by: NURSE PRACTITIONER

## 2018-11-15 PROCEDURE — 3008F BODY MASS INDEX DOCD: CPT | Mod: CPTII,S$GLB,, | Performed by: NURSE PRACTITIONER

## 2018-11-15 PROCEDURE — 99999 PR PBB SHADOW E&M-EST. PATIENT-LVL V: CPT | Mod: PBBFAC,,, | Performed by: NURSE PRACTITIONER

## 2018-11-15 NOTE — PROGRESS NOTES
Sakina Barrios  was seen in follow-up to discuss sleep study results and potential treatment options.     CHIEF COMPLAINT:    Chief Complaint   Patient presents with    Sleep Apnea     INTERVAL HISTORY:    11/15/2018 ZARIA Robles NP: Since last clinic visit, pt has completed inconclusive HST and recommended confirmatory PSG.  Discussed sleep study results in detail. Patient complaints of snoring, daytime fatigue, interrupted sleep, daytime sleepiness, and memory impairment remain the same. ESS 5.       06/28/2018 ZARIA Robles NP: Initial HISTORY OF PRESENT ILLNESS: Sakina Barrios is a 53 y.o. female is here for sleep evaluation.       Accompanied by friend today     Patient complaints include: snoring, daytime fatigue, interrupted sleep, daytime sleepiness, and memory impairment  Reports difficulty initiating sleep and staying asleep - per pt managed by psychiatrist    Memory impairment worse with Neurontin + Flexeril combo for pain management; since stopping Flexeril, memory issues improving     Under psyciatry care, including seroquel management     Of note PSG ordered denied by insurance, HST ordered by rheum already authorized by insurance but not yet scheduled.     Denies symptoms of restless legs or kicking during sleep.      EPWORTH SLEEPINESS SCALE 6/27/2018   Sitting and reading 1   Watching TV 1   Sitting, inactive in a public place (e.g. a theatre or a meeting) 1   As a passenger in a car for an hour without a break 1   Lying down to rest in the afternoon when circumstances permit 1   Sitting and talking to someone 0   Sitting quietly after a lunch without alcohol 1   In a car, while stopped for a few minutes in traffic 0   Total score 6       SLEEP ROUTINE:  Sleep Clinic New Patient 6/27/2018   What time do you go to bed on a week day? (Give a range) Between 11 pm - 1 am   What time do you go to bed on a day off? (Give a range) Between 11 pm - 1 am   How long does it take you to fall asleep? (Give a range)  1 hour   On average, how many times per night do you wake up? 1-2 times   How long does it take you to fall back into sleep? (Give a range) About 10 minutes   What time do you wake up to start your day on a week day? (Give a range) 7   What time do you wake up to start your day on a day off? (Give a range) 7   What time do you get out of bed? (Give a range) 7:30   On average, how many hours do you sleep? 5-6   On average, how many naps do you take per day? None   Rate your sleep quality from 0 to 5 (0-poor, 5-great). 2   Have you experienced:  Weight gain?   How much weight have you lost or gained (in lbs.) in the last year?  Gained 15-20   On average, how many times per night do you go to the bathroom?  0   Have you ever had a sleep study/CPAP machine/surgery for sleep apnea? No   Have you ever had a CPAP machine for sleep apnea? No   Have you ever had surgery for sleep apnea? No      07/06/2018  lb. The overall AHI was 4 and overall RDI was 10. The oxygen dante was 82.5% and % time < 90% SpO2 was 2.8%.  10/29/2018  lb. The overall AHI was 12.8 with an oxygen dante of 84.0%. The supine AHI was 15.9 and the REM AHI was 4.7.       PAST MEDICAL HISTORY:    Active Ambulatory Problems     Diagnosis Date Noted    Menopausal symptoms 07/09/2012    Cervical myofascial pain syndrome 01/31/2018    Right sided sciatica 01/31/2018    Neck pain 03/21/2018    Lower back pain 03/21/2018    Bilateral hip pain 03/21/2018    Cervical paraspinal muscle spasm 04/24/2018    ANTONIO (obstructive sleep apnea)     Memory loss 07/31/2018    Anxiety and depression 07/31/2018    Fibromyalgia 07/31/2018    HTN (hypertension) 11/14/2018    Hyperlipemia 11/14/2018     Resolved Ambulatory Problems     Diagnosis Date Noted    Myalgia 04/24/2018     Past Medical History:   Diagnosis Date    Carpal tunnel syndrome     Hyperlipemia     Hypertension     Mental disorder                 PAST SURGICAL HISTORY:    Past  Surgical History:   Procedure Laterality Date    CARPAL TUNNEL RELEASE      Bilateral     SECTION      x3    HYSTERECTOMY  approx 42 y/o    BHUMI BSO-pain    NASAL SINUS SURGERY           FAMILY HISTORY:                Family History   Problem Relation Age of Onset    Hypertension Father     Diabetes Father     Coronary artery disease Father     Hypertension Mother     Breast cancer Mother 72    Colon cancer Neg Hx     Ovarian cancer Neg Hx        SOCIAL HISTORY:          Tobacco:   Social History     Tobacco Use   Smoking Status Current Every Day Smoker    Packs/day: 1.00    Years: 30.00    Pack years: 30.00   Smokeless Tobacco Never Used       Alcohol use:    Social History     Substance and Sexual Activity   Alcohol Use No    Comment: No                 ALLERGIES:  Review of patient's allergies indicates:  No Known Allergies    CURRENT MEDICATIONS:    Current Outpatient Medications   Medication Sig Dispense Refill    alprazolam (XANAX) 1 MG tablet 3 (three) times daily as needed.   0    aspirin 325 MG tablet Take 325 mg by mouth once daily.      cholecalciferol, vitamin D3, (VITAMIN D3) 5,000 unit Tab Take 5,000 Units by mouth once daily.      CRESTOR 20 mg tablet Take 20 mg by mouth every evening.       DULoxetine (CYMBALTA) 60 MG capsule Take 60 mg by mouth every morning.  3    gabapentin (NEURONTIN) 300 MG capsule Take 1 capsule (300 mg total) by mouth 3 (three) times daily. 90 capsule 11    losartan-hydrochlorothiazide (HYZAAR) 50-12.5 mg per tablet Take 1 tablet by mouth once daily.        omeprazole (PRILOSEC) 40 MG capsule Take 1 capsule by mouth once daily.      potassium chloride SA (K-DUR,KLOR-CON) 10 MEQ tablet Take 10 mEq by mouth 2 (two) times daily.  3    QUEtiapine (SEROQUEL) 100 MG Tab Take 200 mg by mouth every evening.       rosuvastatin (CRESTOR) 20 MG tablet rosuvastatin 20 mg tablet   TAKE 1 TABLET BY MOUTH AT BEDTIME      scopolamine (TRANSDERM-SCOP) 1.3-1.5  "mg (1 mg over 3 days) Transderm-Scop 1.5 mg transdermal patch (1 mg over 3 days)   Apply behind ear 4 hours before event and change every 3 days      vitamin E 400 UNIT capsule Take 400 Units by mouth once daily.       No current facility-administered medications for this visit.                   REVIEW OF SYSTEMS:     Sleep related symptoms as per HPI.  Sleep Clinic ROS  6/27/2018   Difficulty breathing through the nose?  Sometimes   Sore throat or dry mouth in the morning? Yes   Irregular or very fast heart beat?  Sometimes   Shortness of breath?  No   Acid reflux? Yes   Body aches and pains?  Yes   Morning headaches? Yes   Dizziness? Sometimes   Mood changes?  Yes   Do you exercise?  Sometimes   Do you feel like moving your legs a lot?  Sometimes     Otherwise, a balance of systems reviewed is negative.          PHYSICAL EXAM:  /81 (BP Location: Left arm, Patient Position: Sitting)   Pulse 88   Ht 5' 1.5" (1.562 m)   Wt 69 kg (152 lb 1.9 oz)   BMI 28.28 kg/m²   GENERAL: Overweight development, well groomed                                               ASSESSMENT:    Obstructive sleep apnea, mild by AHI. The patient symptomatically has snoring, daytime fatigue, interrupted sleep, daytime sleepiness, and memory impairment with findings of crowded oral airway and elevated body mass index. Medical co-morbidities: systemic HTN, fibromyalgia, anxiety, GERD, and obesity.  This warrants treatment for obstructive sleep apnea.      Daytime sleepiness, untreated ANTONIO compounded by medication effects (multiple sedating meds)     Insomnia, chronic, managed by psychiatrist     PLAN:    Education: During our discussion today, we talked about the etiology of obstructive sleep apnea as well as the potential ramifications of untreated sleep apnea, which could include daytime sleepiness, hypertension, heart disease and/or stroke. We discussed potential treatment options, which could include weight loss, body positioning, " continuous positive airway pressure (CPAP), OA, EPAP, or referral for surgical consideration.     Treatment:  -weight loss + PAP therapy   -trial APAP 6 - 20 cm @ [facility]. RTC 31 - 90 days after PAP . Pt to make f/u appt approximately 5 weeks from set up date either via [facility] or by contacting Sleep Clinic  -Meanwhile, avoid supine sleep since ANTONIO worse in this sleeping position     Precautions: The patient was advised to abstain from driving should they feel sleepy  or drowsy.

## 2018-11-15 NOTE — PATIENT INSTRUCTIONS
[facility] will call you regarding  in approximately 4 - 7 business days. If you have not heard from them after that time frame, please call them directly at [phone] to follow-up     After obtaining your CPAP machine, please make a follow-up appointment to see me back in Sleep Clinic approximately 5 weeks from the date you received your machine. This appointment is recommended no sooner than 31 days from set up date, but no later than 90 days.

## 2019-01-10 ENCOUNTER — PATIENT MESSAGE (OUTPATIENT)
Dept: SLEEP MEDICINE | Facility: CLINIC | Age: 54
End: 2019-01-10

## 2019-01-21 ENCOUNTER — OFFICE VISIT (OUTPATIENT)
Dept: NEUROLOGY | Facility: CLINIC | Age: 54
End: 2019-01-21
Payer: COMMERCIAL

## 2019-01-21 VITALS
SYSTOLIC BLOOD PRESSURE: 106 MMHG | DIASTOLIC BLOOD PRESSURE: 76 MMHG | HEART RATE: 92 BPM | WEIGHT: 161.63 LBS | BODY MASS INDEX: 29.74 KG/M2 | HEIGHT: 62 IN | RESPIRATION RATE: 18 BRPM

## 2019-01-21 DIAGNOSIS — G47.33 OSA (OBSTRUCTIVE SLEEP APNEA): ICD-10-CM

## 2019-01-21 DIAGNOSIS — I60.9 SAH (SUBARACHNOID HEMORRHAGE): ICD-10-CM

## 2019-01-21 DIAGNOSIS — S06.0X0S CONCUSSION WITHOUT LOSS OF CONSCIOUSNESS, SEQUELA: Primary | ICD-10-CM

## 2019-01-21 DIAGNOSIS — M79.7 FIBROMYALGIA: ICD-10-CM

## 2019-01-21 DIAGNOSIS — R41.3 MEMORY LOSS: ICD-10-CM

## 2019-01-21 PROCEDURE — 3074F SYST BP LT 130 MM HG: CPT | Mod: CPTII,S$GLB,, | Performed by: PSYCHIATRY & NEUROLOGY

## 2019-01-21 PROCEDURE — 99999 PR PBB SHADOW E&M-EST. PATIENT-LVL IV: ICD-10-PCS | Mod: PBBFAC,,, | Performed by: PSYCHIATRY & NEUROLOGY

## 2019-01-21 PROCEDURE — 3008F BODY MASS INDEX DOCD: CPT | Mod: CPTII,S$GLB,, | Performed by: PSYCHIATRY & NEUROLOGY

## 2019-01-21 PROCEDURE — 3078F DIAST BP <80 MM HG: CPT | Mod: CPTII,S$GLB,, | Performed by: PSYCHIATRY & NEUROLOGY

## 2019-01-21 PROCEDURE — 3008F PR BODY MASS INDEX (BMI) DOCUMENTED: ICD-10-PCS | Mod: CPTII,S$GLB,, | Performed by: PSYCHIATRY & NEUROLOGY

## 2019-01-21 PROCEDURE — 99999 PR PBB SHADOW E&M-EST. PATIENT-LVL IV: CPT | Mod: PBBFAC,,, | Performed by: PSYCHIATRY & NEUROLOGY

## 2019-01-21 PROCEDURE — 3078F PR MOST RECENT DIASTOLIC BLOOD PRESSURE < 80 MM HG: ICD-10-PCS | Mod: CPTII,S$GLB,, | Performed by: PSYCHIATRY & NEUROLOGY

## 2019-01-21 PROCEDURE — 99214 OFFICE O/P EST MOD 30 MIN: CPT | Mod: S$GLB,,, | Performed by: PSYCHIATRY & NEUROLOGY

## 2019-01-21 PROCEDURE — 3074F PR MOST RECENT SYSTOLIC BLOOD PRESSURE < 130 MM HG: ICD-10-PCS | Mod: CPTII,S$GLB,, | Performed by: PSYCHIATRY & NEUROLOGY

## 2019-01-21 PROCEDURE — 99214 PR OFFICE/OUTPT VISIT, EST, LEVL IV, 30-39 MIN: ICD-10-PCS | Mod: S$GLB,,, | Performed by: PSYCHIATRY & NEUROLOGY

## 2019-01-21 NOTE — PROGRESS NOTES
"HPI:  Sakina Barrios is a 53 y.o. female with memory loss. Anxiety prominent with other somatic symptoms like dizziness, visual changes without cause found.    Since the last visit with me, the patient never had neuropsychological testing.  She did see BOBO Rosario in Neurology her for follow up and improvement in her memory was noted- "as her memory loss resolved, after her anxiety and depression became managed per Psychiatry/Dr. Alexis"  She was going to have yearly follow up but returns today for "Fall, brain bleed, concussion."     She states this occurred on 1/2/2019. She was sitting at home in an office chair which broke while she was in it. She impacted her head on a ceramic tile. She does not feel certain that she got knocked out.   She had a CT head and had cerebral bleed and she was sent to [facility] where she stayed 2 nights. She was discharged but then had vomiting and concussive symptoms and was readmitting to [facility] and had repeat CT which she was told showed great improvement in her prior bleeding.  She states she has headache which is improved compared to just after the fall.  She has some dizziness at times. Memory is challenged currently with small details, but has good long term memory.  All of her concussive symptoms including memory are improved.   For headaches, she was using hydrocodone but this was since removed. She did not want to take fioricet long. Currently she uses OTC meds with relief currently.    She also complained of some leg pain with fall and had xrays at [facility] which was unremarkable per family.She is seeing PT for lumbar pain at home.     She is transitioning from living with daughter to living alone now    She had a cruise scheduled on 1/6/2018 but could not go due to this injury- her paper work for this was completed today.   Review of Systems   Constitutional: Negative for fever.   HENT: Negative for nosebleeds.    Eyes: Negative for double vision.   Respiratory: " Negative for hemoptysis.    Cardiovascular: Negative for leg swelling.   Gastrointestinal: Negative for blood in stool.   Genitourinary: Negative for hematuria.   Musculoskeletal: Negative for falls.   Skin: Negative for itching.   Neurological: Negative for seizures.   Psychiatric/Behavioral: Positive for memory loss.         I have reviewed all of this patient's past medical and surgical histories as well as family and social histories and active allergies and medications as documented in the electronic medical record.        Exam:  Gen Appearance, well developed/nourished in no apparent distress  CV: 2+ distal pulses with no edema or swelling  Neuro:  MS: Awake, alert, oriented to place, person, time, situation. Sustains attention. Recent recall is intact/remote memory intact, Language is full to spontaneous speech/repetition/naming/comprehension. Fund of Knowledge is full  There is some pyschomotor slowing and she appears anxious at times.   Names 2/2 presidents with anxiety interfering with effort  Draws clock well with mention that she could not do this for PCP  CN: Optic discs are flat with normal vasculature, PERRL, Extraoccular movements and visual fields are full. Normal facial sensation and strength, Hearing symmetric, Tongue and Palate are midline and strong. Shoulder Shrug symmetric and strong.  Motor: Normal bulk, tone, no abnormal movements. 5/5 strength bilateral upper/lower extremities with 2+ reflexes and not clonus  Sensory: symmetric to temp, and vibration,  Romberg negative  Cerebellar: Finger-nose,Heal-shin, Rapid alternating movements intact  Gait: Normal stance, no ataxia      MRI brain 10/2017: Atrophy but no acute changes  10/2017 B12/RPR normal    MRI L spine 3/2018: Minimal desiccation of the L3 disc with a questionable small left paracentral disc bulge at L3-L4.  No significant spinal canal or foraminal encroachment.    3/2018 C spine and hip xrays reviewed: Cervical spine, AP and  lateral: Vertebral body alignment, heights and disc spaces are within normal limits.  The prevertebral soft tissues are normal.  No fracture or subluxation is seen    2018 CMP, CBC, B12, LORREI, RA factor ESR reviewed.  Patient was told to follow up with PCP for mildly low K and rheumatology for elevated ESR  Carotid US 10/2017:  No stenosis    2018 Creatinine normal     Assessment/Plan: Sakina Barrios is a 53 y.o. female with  memory loss. Anxiety prominent with other somatic symptoms like dizziness, visual changes without cause found.  Was found to have a cerebral bleed after fall out of a chair this month. Sounds like a traumatic subarachnoid hemorrhage.   I recommend:      1. Will obtain records from [facility] for review at the next visit.   2. Reassured patient, as her concussive symptoms are already improving, she should have some continued improvement  3. Keep follow up with Neurosurgeon as scheduled next week for further monitoring of SAH   4. Continue Gabapentin 300 mg tid, as she has greater pain relief with this.  Prior sedation attributed to concurrent use of Flexeril per patient. No longer taking Flexeril.  5. She is also taking Cymbalta 60 mg per Psychiatry, though this has no effect on her pain per patient.   6. May repeat cervical TPI's PRN  increased neck pain.   7. Very mild findings on bilateral hip X-rays and MRI L-spine, and C-spine X-rays were overall normal-nothing to explain her diffuse, ongoing MSK complaints, suggestive of fibromyalgia, diagnosed by Rheumatology prior.   -She is currently in home PT after some back pain after fall  8. Repeat PT prn myalgic complaints, neck pain, or lumbar complaints. She was referred to pain clinic prior.   9. She was recently diagnosed with ANTONIO, on  CPAP.   6. Memory loss resolved with resolution of anxiety and mood issues prior. She has a bit more symptoms after concussion which is suspected to improve. She is functioning well and  does not require neuropsych testing at this time. Could have incidental Atrophy on MRI. repeat order for NP testing with return of her memory loss.    7. Continue compound cream for cervical complaints.   8. Chronic Migraine noted for years-resolved currently. 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 Will avoid adding meds given her complaint of sleepiness prior       RTC 3 months

## 2019-01-22 ENCOUNTER — OFFICE VISIT (OUTPATIENT)
Dept: SLEEP MEDICINE | Facility: CLINIC | Age: 54
End: 2019-01-22
Payer: COMMERCIAL

## 2019-01-22 VITALS
HEART RATE: 95 BPM | DIASTOLIC BLOOD PRESSURE: 78 MMHG | SYSTOLIC BLOOD PRESSURE: 121 MMHG | HEIGHT: 62 IN | BODY MASS INDEX: 29.74 KG/M2 | WEIGHT: 161.63 LBS

## 2019-01-22 DIAGNOSIS — G47.33 OBSTRUCTIVE SLEEP APNEA: Primary | ICD-10-CM

## 2019-01-22 PROCEDURE — 3008F BODY MASS INDEX DOCD: CPT | Mod: CPTII,S$GLB,, | Performed by: NURSE PRACTITIONER

## 2019-01-22 PROCEDURE — 99213 PR OFFICE/OUTPT VISIT, EST, LEVL III, 20-29 MIN: ICD-10-PCS | Mod: S$GLB,,, | Performed by: NURSE PRACTITIONER

## 2019-01-22 PROCEDURE — 99999 PR PBB SHADOW E&M-EST. PATIENT-LVL III: CPT | Mod: PBBFAC,,, | Performed by: NURSE PRACTITIONER

## 2019-01-22 PROCEDURE — 99213 OFFICE O/P EST LOW 20 MIN: CPT | Mod: S$GLB,,, | Performed by: NURSE PRACTITIONER

## 2019-01-22 PROCEDURE — 3078F PR MOST RECENT DIASTOLIC BLOOD PRESSURE < 80 MM HG: ICD-10-PCS | Mod: CPTII,S$GLB,, | Performed by: NURSE PRACTITIONER

## 2019-01-22 PROCEDURE — 99999 PR PBB SHADOW E&M-EST. PATIENT-LVL III: ICD-10-PCS | Mod: PBBFAC,,, | Performed by: NURSE PRACTITIONER

## 2019-01-22 PROCEDURE — 3074F SYST BP LT 130 MM HG: CPT | Mod: CPTII,S$GLB,, | Performed by: NURSE PRACTITIONER

## 2019-01-22 PROCEDURE — 3078F DIAST BP <80 MM HG: CPT | Mod: CPTII,S$GLB,, | Performed by: NURSE PRACTITIONER

## 2019-01-22 PROCEDURE — 3008F PR BODY MASS INDEX (BMI) DOCUMENTED: ICD-10-PCS | Mod: CPTII,S$GLB,, | Performed by: NURSE PRACTITIONER

## 2019-01-22 PROCEDURE — 3074F PR MOST RECENT SYSTOLIC BLOOD PRESSURE < 130 MM HG: ICD-10-PCS | Mod: CPTII,S$GLB,, | Performed by: NURSE PRACTITIONER

## 2019-01-22 NOTE — PROGRESS NOTES
Sakina Barrios  was seen in follow-up for ANTONIO management and CPAP equipment check.     CHIEF COMPLAINT:  ANTONIO    INTERVAL HISTORY:    01/22/2019 ZARIA Robles NP: Pt returns after set up of PAP machine on 11/20/2018 at [facility]. Pt sleep complaints of snoring, daytime fatigue, interrupted sleep, and daytime sleepiness improving with PAP use. Denies oral/nasal drying with Wisp mask. Denies mask leaks. Denies rainout.  Denies pressure intolerance or air hunger. Denies congestion. Denies aerophagia.     Had been regularly using PAP machine since set up until she had accident falling off swivel chair necessitating hospitalization at [facility], during which time she did not use her own unit. However, when she is home she regularly uses machine. Pt continues to see neurologist for concussion management.     APAP 6 - 20 cm, TH: 288, BH: 319.9, > 4 hours: 19/30, 30-day ave: 4.5 hours, MF: 99%, Predicted AHI: 3.3, PB: 0%, 90%tile pressure: 8.2 cm      11/15/2018 ZARIA Robles NP: Since last clinic visit, pt has completed inconclusive HST and recommended confirmatory PSG.  Discussed sleep study results in detail. Patient complaints of snoring, daytime fatigue, interrupted sleep, daytime sleepiness, and memory impairment remain the same. ESS 5.       06/28/2018 ZARIA Robles NP: Initial HISTORY OF PRESENT ILLNESS: Sakina Barrios is a 53 y.o. female is here for sleep evaluation.       Accompanied by friend today     Patient complaints include: snoring, daytime fatigue, interrupted sleep, daytime sleepiness, and memory impairment  Reports difficulty initiating sleep and staying asleep - per pt managed by psychiatrist    Memory impairment worse with Neurontin + Flexeril combo for pain management; since stopping Flexeril, memory issues improving     Under psyciatry care, including seroquel management     Of note PSG ordered denied by insurance, HST ordered by rheum already authorized by insurance but not yet scheduled.     Denies symptoms of restless  legs or kicking during sleep.      EPWORTH SLEEPINESS SCALE 6/27/2018   Sitting and reading 1   Watching TV 1   Sitting, inactive in a public place (e.g. a theatre or a meeting) 1   As a passenger in a car for an hour without a break 1   Lying down to rest in the afternoon when circumstances permit 1   Sitting and talking to someone 0   Sitting quietly after a lunch without alcohol 1   In a car, while stopped for a few minutes in traffic 0   Total score 6       SLEEP ROUTINE:  Sleep Clinic New Patient 6/27/2018   What time do you go to bed on a week day? (Give a range) Between 11 pm - 1 am   What time do you go to bed on a day off? (Give a range) Between 11 pm - 1 am   How long does it take you to fall asleep? (Give a range) 1 hour   On average, how many times per night do you wake up? 1-2 times   How long does it take you to fall back into sleep? (Give a range) About 10 minutes   What time do you wake up to start your day on a week day? (Give a range) 7   What time do you wake up to start your day on a day off? (Give a range) 7   What time do you get out of bed? (Give a range) 7:30   On average, how many hours do you sleep? 5-6   On average, how many naps do you take per day? None   Rate your sleep quality from 0 to 5 (0-poor, 5-great). 2   Have you experienced:  Weight gain?   How much weight have you lost or gained (in lbs.) in the last year?  Gained 15-20   On average, how many times per night do you go to the bathroom?  0   Have you ever had a sleep study/CPAP machine/surgery for sleep apnea? No   Have you ever had a CPAP machine for sleep apnea? No   Have you ever had surgery for sleep apnea? No      07/06/2018  lb. The overall AHI was 4 and overall RDI was 10. The oxygen dante was 82.5% and % time < 90% SpO2 was 2.8%.  10/29/2018  lb. The overall AHI was 12.8 with an oxygen dante of 84.0%. The supine AHI was 15.9 and the REM AHI was 4.7.       PAST MEDICAL HISTORY:    Active Ambulatory Problems      Diagnosis Date Noted    Menopausal symptoms 2012    Cervical myofascial pain syndrome 2018    Right sided sciatica 2018    Neck pain 2018    Lower back pain 2018    Bilateral hip pain 2018    Cervical paraspinal muscle spasm 2018    ANTONIO (obstructive sleep apnea)     Memory loss 2018    Anxiety and depression 2018    Fibromyalgia 2018    HTN (hypertension) 2018    Hyperlipemia 2018     Resolved Ambulatory Problems     Diagnosis Date Noted    Myalgia 2018     Past Medical History:   Diagnosis Date    Carpal tunnel syndrome     Hyperlipemia     Hypertension     Mental disorder                 PAST SURGICAL HISTORY:    Past Surgical History:   Procedure Laterality Date    CARPAL TUNNEL RELEASE      Bilateral     SECTION      x3    HYSTERECTOMY  approx 42 y/o    BHUMI BSO-pain    NASAL SINUS SURGERY           FAMILY HISTORY:                Family History   Problem Relation Age of Onset    Hypertension Father     Diabetes Father     Coronary artery disease Father     Hypertension Mother     Breast cancer Mother 72    Colon cancer Neg Hx     Ovarian cancer Neg Hx        SOCIAL HISTORY:          Tobacco:   Social History     Tobacco Use   Smoking Status Current Every Day Smoker    Packs/day: 1.00    Years: 30.00    Pack years: 30.00   Smokeless Tobacco Never Used       Alcohol use:    Social History     Substance and Sexual Activity   Alcohol Use No    Comment: No                 ALLERGIES:  Review of patient's allergies indicates:  No Known Allergies    CURRENT MEDICATIONS:    Current Outpatient Medications   Medication Sig Dispense Refill    alprazolam (XANAX) 1 MG tablet 3 (three) times daily as needed.   0    aspirin 325 MG tablet Take 325 mg by mouth once daily.      cholecalciferol, vitamin D3, (VITAMIN D3) 5,000 unit Tab Take 5,000 Units by mouth once daily.      CRESTOR 20 mg tablet Take 20 mg  "by mouth every evening.       DULoxetine (CYMBALTA) 60 MG capsule Take 60 mg by mouth every morning.  3    gabapentin (NEURONTIN) 300 MG capsule Take 1 capsule (300 mg total) by mouth 3 (three) times daily. 90 capsule 11    omeprazole (PRILOSEC) 40 MG capsule Take 1 capsule by mouth once daily.      potassium chloride SA (K-DUR,KLOR-CON) 10 MEQ tablet Take 10 mEq by mouth 2 (two) times daily.  3    QUEtiapine (SEROQUEL) 100 MG Tab Take 200 mg by mouth every evening.       scopolamine (TRANSDERM-SCOP) 1.3-1.5 mg (1 mg over 3 days) Transderm-Scop 1.5 mg transdermal patch (1 mg over 3 days)   Apply behind ear 4 hours before event and change every 3 days      vitamin E 400 UNIT capsule Take 400 Units by mouth once daily.       No current facility-administered medications for this visit.                   REVIEW OF SYSTEMS:     Sleep related symptoms as per HPI.  Sleep Clinic ROS  6/27/2018   Difficulty breathing through the nose?  Sometimes   Sore throat or dry mouth in the morning? Yes   Irregular or very fast heart beat?  Sometimes   Shortness of breath?  No   Acid reflux? Yes   Body aches and pains?  Yes   Morning headaches? Yes   Dizziness? Sometimes   Mood changes?  Yes   Do you exercise?  Sometimes   Do you feel like moving your legs a lot?  Sometimes     Otherwise, a balance of systems reviewed is negative.          PHYSICAL EXAM:  /78   Pulse 95   Ht 5' 1.5" (1.562 m)   Wt 73.3 kg (161 lb 9.6 oz)   BMI 30.04 kg/m²   GENERAL: Overweight development, well groomed                                               ASSESSMENT:    Obstructive sleep apnea, mild by AHI. The patient symptomatically has snoring, daytime fatigue, interrupted sleep, daytime sleepiness, and memory impairment improving with CPAP. The patient is adherent on CPAP and experiencing symptomatic benefit. Medical co-morbidities: systemic HTN, fibromyalgia, anxiety, GERD, and obesity.  This warrants treatment for obstructive sleep apnea. "  Pt had a fall which lead to hospitalization that affected CPAP use x 1 week because she did not use her own unit while admitted.     Insomnia, chronic, managed by psychiatrist     PLAN:    Treatment:  -continue APAP. Increase mask-on time. RTC 4 months.     Education: During our discussion today, we talked about the etiology of obstructive sleep apnea as well as the potential ramifications of untreated sleep apnea, which could include daytime sleepiness, hypertension, heart disease and/or stroke. We discussed potential treatment options, which could include weight loss, body positioning, continuous positive airway pressure (CPAP), OA, EPAP, or referral for surgical consideration.     Precautions: The patient was advised to abstain from driving should they feel sleepy  or drowsy.

## 2019-01-31 ENCOUNTER — PATIENT MESSAGE (OUTPATIENT)
Dept: OBSTETRICS AND GYNECOLOGY | Facility: CLINIC | Age: 54
End: 2019-01-31

## 2019-04-25 ENCOUNTER — TELEPHONE (OUTPATIENT)
Dept: SLEEP MEDICINE | Facility: CLINIC | Age: 54
End: 2019-04-25

## 2019-04-25 ENCOUNTER — OFFICE VISIT (OUTPATIENT)
Dept: NEUROLOGY | Facility: CLINIC | Age: 54
End: 2019-04-25
Payer: COMMERCIAL

## 2019-04-25 ENCOUNTER — PROCEDURE VISIT (OUTPATIENT)
Dept: NEUROLOGY | Facility: CLINIC | Age: 54
End: 2019-04-25
Payer: COMMERCIAL

## 2019-04-25 ENCOUNTER — PATIENT MESSAGE (OUTPATIENT)
Dept: SLEEP MEDICINE | Facility: CLINIC | Age: 54
End: 2019-04-25

## 2019-04-25 VITALS
RESPIRATION RATE: 16 BRPM | HEIGHT: 61 IN | BODY MASS INDEX: 31.03 KG/M2 | WEIGHT: 164.38 LBS | DIASTOLIC BLOOD PRESSURE: 76 MMHG | SYSTOLIC BLOOD PRESSURE: 124 MMHG | HEART RATE: 79 BPM

## 2019-04-25 DIAGNOSIS — M54.2 NECK PAIN: ICD-10-CM

## 2019-04-25 DIAGNOSIS — R42 DIZZINESS: ICD-10-CM

## 2019-04-25 DIAGNOSIS — M79.7 FIBROMYALGIA: ICD-10-CM

## 2019-04-25 DIAGNOSIS — E78.5 HYPERLIPIDEMIA, UNSPECIFIED HYPERLIPIDEMIA TYPE: ICD-10-CM

## 2019-04-25 DIAGNOSIS — M79.18 CERVICAL MYOFASCIAL PAIN SYNDROME: Primary | ICD-10-CM

## 2019-04-25 DIAGNOSIS — F41.9 ANXIETY AND DEPRESSION: ICD-10-CM

## 2019-04-25 DIAGNOSIS — R51.9 HEADACHE, UNSPECIFIED HEADACHE TYPE: ICD-10-CM

## 2019-04-25 DIAGNOSIS — R41.3 MEMORY LOSS: ICD-10-CM

## 2019-04-25 DIAGNOSIS — S06.6X0D SUBARACHNOID HEMORRHAGE FOLLOWING INJURY, NO LOSS OF CONSCIOUSNESS, SUBSEQUENT ENCOUNTER: Primary | ICD-10-CM

## 2019-04-25 DIAGNOSIS — F32.A ANXIETY AND DEPRESSION: ICD-10-CM

## 2019-04-25 DIAGNOSIS — I10 HYPERTENSION, UNSPECIFIED TYPE: ICD-10-CM

## 2019-04-25 DIAGNOSIS — M79.18 CERVICAL MYOFASCIAL PAIN SYNDROME: ICD-10-CM

## 2019-04-25 DIAGNOSIS — M54.5 LOW BACK PAIN, UNSPECIFIED BACK PAIN LATERALITY, UNSPECIFIED CHRONICITY, WITH SCIATICA PRESENCE UNSPECIFIED: ICD-10-CM

## 2019-04-25 DIAGNOSIS — G47.33 OSA (OBSTRUCTIVE SLEEP APNEA): ICD-10-CM

## 2019-04-25 PROCEDURE — 3008F PR BODY MASS INDEX (BMI) DOCUMENTED: ICD-10-PCS | Mod: CPTII,S$GLB,, | Performed by: NURSE PRACTITIONER

## 2019-04-25 PROCEDURE — 99999 PR PBB SHADOW E&M-EST. PATIENT-LVL IV: ICD-10-PCS | Mod: PBBFAC,,, | Performed by: NURSE PRACTITIONER

## 2019-04-25 PROCEDURE — 20553 PR INJECT TRIGGER POINTS, > 3: ICD-10-PCS | Mod: S$GLB,,, | Performed by: NURSE PRACTITIONER

## 2019-04-25 PROCEDURE — 3078F PR MOST RECENT DIASTOLIC BLOOD PRESSURE < 80 MM HG: ICD-10-PCS | Mod: CPTII,S$GLB,, | Performed by: NURSE PRACTITIONER

## 2019-04-25 PROCEDURE — 20553 NJX 1/MLT TRIGGER POINTS 3/>: CPT | Mod: S$GLB,,, | Performed by: NURSE PRACTITIONER

## 2019-04-25 PROCEDURE — 99999 PR PBB SHADOW E&M-EST. PATIENT-LVL IV: CPT | Mod: PBBFAC,,, | Performed by: NURSE PRACTITIONER

## 2019-04-25 PROCEDURE — 3078F DIAST BP <80 MM HG: CPT | Mod: CPTII,S$GLB,, | Performed by: NURSE PRACTITIONER

## 2019-04-25 PROCEDURE — 99214 PR OFFICE/OUTPT VISIT, EST, LEVL IV, 30-39 MIN: ICD-10-PCS | Mod: 25,S$GLB,, | Performed by: NURSE PRACTITIONER

## 2019-04-25 PROCEDURE — 99214 OFFICE O/P EST MOD 30 MIN: CPT | Mod: 25,S$GLB,, | Performed by: NURSE PRACTITIONER

## 2019-04-25 PROCEDURE — 3008F BODY MASS INDEX DOCD: CPT | Mod: CPTII,S$GLB,, | Performed by: NURSE PRACTITIONER

## 2019-04-25 PROCEDURE — 3074F SYST BP LT 130 MM HG: CPT | Mod: CPTII,S$GLB,, | Performed by: NURSE PRACTITIONER

## 2019-04-25 PROCEDURE — 3074F PR MOST RECENT SYSTOLIC BLOOD PRESSURE < 130 MM HG: ICD-10-PCS | Mod: CPTII,S$GLB,, | Performed by: NURSE PRACTITIONER

## 2019-04-25 RX ORDER — TIZANIDINE HYDROCHLORIDE 6 MG/1
6 CAPSULE, GELATIN COATED ORAL DAILY PRN
COMMUNITY
End: 2019-06-26 | Stop reason: DRUGHIGH

## 2019-04-25 NOTE — PATIENT INSTRUCTIONS
We expect your concussion symptoms to improve until your are 6 months out from your closed head injury.     Continue physical therapy for your neck pain and lower back pain.     If you continue to have memory issues at your next visit, we will proceed with Neuropsych testing to evaluate this further.     Please see Psychiatry for increased mood issues.

## 2019-04-25 NOTE — PROGRESS NOTES
"HPI:  Sakina Barrios is a 54 y.o. female female with cervical and lumbar complaints, as well as fibromyalgia, per Rheumatology. Prior complaint of memory loss. Anxiety prominent with other somatic symptoms like dizziness, visual changes without cause found. She has HTN and HLD, as well as ANTONIO. She is a current every day smoker. CHI from a fall in 1/2019 with a subarachnoid hemorrhage and admit to [facility] with some post-concussive complaints afterwards.     She presents today for a follow up visit. She did follow up with Dr. Abad in late 1/2019.     She is in PT for "everything" per patient-this is ordered by another provider. She had a headache, which resolved after PT for her neck yesterday. Her headache returned later in the evening after her grandchild, that she was babysitting, fell down, causing her to abruptly turn her neck, and her headache returned afterwards.     She complaint of increased lumbar complaints and right leg pain lately. She is working on this with PT as well.     She is no longer ambulating with a walker. She continues to forget what she went into a room to obtain.     She has experienced increased agitation since her CHI. Some marital conflict related to her feeling as if she cannot babysit at this time, secondary to recovering from her SAH/CHI.     Review of Systems   Constitutional: Negative for fever.   HENT: Negative for nosebleeds.    Eyes: Negative for double vision.   Respiratory: Negative for hemoptysis.    Cardiovascular: Negative for leg swelling.   Gastrointestinal: Negative for blood in stool.   Genitourinary: Negative for hematuria.   Musculoskeletal: Negative for falls.   Skin: Negative for itching.   Neurological: Negative for seizures.   Psychiatric/Behavioral: Positive for memory loss.       I have reviewed all of this patient's past medical and surgical histories as well as family and social histories and active allergies and medications as " documented in the electronic medical record.    Exam:  Gen Appearance, well developed/nourished in no apparent distress  CV: 2+ distal pulses with no edema or swelling  Neuro:  MS: Awake, alert, oriented to place, person, time, situation. Sustains attention. Recent recall is intact/remote memory intact, Language is full to spontaneous speech/repetition/naming/comprehension. Fund of Knowledge is full  There is some pyschomotor slowing and she appears anxious at times.   Names 2/2 presidents with anxiety interfering with effort  Draws clock well with mention that she could not do this for PCP  CN: Optic discs are flat with normal vasculature, PERRL, Extraoccular movements and visual fields are full. Normal facial sensation and strength, Hearing symmetric, Tongue and Palate are midline and strong. Shoulder Shrug symmetric and strong.  Motor: Normal bulk, tone, no abnormal movements. 5/5 strength bilateral upper/lower extremities with 2+ reflexes and not clonus  Sensory: symmetric to temp, and vibration,  Romberg negative  Cerebellar: Finger-nose,Heal-shin, Rapid alternating movements intact  Gait: Normal stance, no ataxia  MSK survey: bilateral palpable trigger points to trapezius    Imaging:   CT Head 1/2/2019:   1. Small amount of residual SAH along the cortical sulci of the left frontal parietal complex in the high convexity. A large portion of the previously identified SAH has resolved.     MRI Brain 10/2017: Atrophy but no acute changes  10/2017 B12/RPR normal    MRI L-spine 3/2018: Minimal desiccation of the L3 disc with a questionable small left paracentral disc bulge at L3-L4.  No significant spinal canal or foraminal encroachment.    3/2018 C-spine and Hip X-rays:   Cervical spine, AP and lateral: Vertebral body alignment, heights and disc spaces are within normal limits.  The prevertebral soft tissues are normal.  No fracture or subluxation is seen    2018 CMP, CBC, B12, LORRIE, RA factor ESR reviewed.  Patient  was told to follow up with PCP for mildly low K and rheumatology for elevated ESR  Carotid US 10/2017:  No stenosis    2018 Creatinine normal     Assessment/Plan: Sakina Barrios is a 54 y.o. female with  memory loss. Anxiety prominent with other somatic symptoms like dizziness, visual changes without cause found.  Was found to have a cerebral bleed after fall out of a chair this month. Sounds like a traumatic subarachnoid hemorrhage.     I recommend:   1. Continue PT for cervicogenic headaches and increased musculoskeletal complaints since CHI. Dry needling is very helpful. Headaches seem directly related to increased cervical complaints. If she continues with cervical or lumbar pain post PT, I will consider imaging to evaluate further, then consider referral back to pain management. Note prior history of fibromyalgia, which could be contributing, given her increased mood issues currently. She is also taking Cymbalta 60 mg per Psychiatry, though this has no effect on her pain per patient.   2. Repeat cervical TPI's at this time. This may help her cervicogenic headaches, as well as her neck pain.   3. If she continues with post concussive cognitive issues after 6 months, I will proceed with Neuropsych testing. Reassured patient, as her concussive symptoms are already improving, she should have some continued improvement.  4. Advised to see Psychiatry regarding increased agitation after CHI.   5. Order MRI Brain to evaluate for complete resolution of her CHI.   6. She was released from follow up with Neurosurgery in late 1/2019. Will repeat MRI Brain at this time to evaluate for complete resolution of her SAH.   7. Continue Gabapentin 300 mg tid, as she has greater pain relief with this.  Prior sedation attributed to concurrent use of Flexeril per patient. No longer taking Flexeril.  8. Very mild findings on bilateral hip X-rays and MRI L-spine, and C-spine X-rays were overall normal-nothing to explain her diffuse,  "ongoing MSK complaints, suggestive of fibromyalgia, diagnosed by Rheumatology prior.   9. Advised continued compliance with CPAP for her ANTONIO.   10. Prior to CHI-Memory loss resolved with resolution of anxiety and mood issues. She has a bit more symptoms after concussion which is suspected to improve. Could have incidental Atrophy on MRI.  11. Continue compound cream for cervical complaints.   12. Chronic Migraine noted for years-resolved currently. 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 Will avoid adding meds given her complaint of sleepiness prior.      RTC 2 months    "note not shared"  "

## 2019-05-02 ENCOUNTER — HOSPITAL ENCOUNTER (OUTPATIENT)
Dept: RADIOLOGY | Facility: HOSPITAL | Age: 54
Discharge: HOME | End: 2019-05-02
Attending: NURSE PRACTITIONER
Payer: COMMERCIAL

## 2019-05-02 DIAGNOSIS — R42 DIZZINESS: ICD-10-CM

## 2019-05-02 DIAGNOSIS — R41.3 MEMORY LOSS: ICD-10-CM

## 2019-05-02 DIAGNOSIS — S06.6X0D SUBARACHNOID HEMORRHAGE FOLLOWING INJURY, NO LOSS OF CONSCIOUSNESS, SUBSEQUENT ENCOUNTER: ICD-10-CM

## 2019-05-02 DIAGNOSIS — R51.9 HEADACHE, UNSPECIFIED HEADACHE TYPE: ICD-10-CM

## 2019-05-02 PROCEDURE — 70551 MRI BRAIN STEM W/O DYE: CPT | Mod: TC

## 2019-05-02 PROCEDURE — 70551 MRI BRAIN STEM W/O DYE: CPT | Mod: 26,,, | Performed by: RADIOLOGY

## 2019-05-02 PROCEDURE — 70551 MRI BRAIN WITHOUT CONTRAST: ICD-10-PCS | Mod: 26,,, | Performed by: RADIOLOGY

## 2019-05-14 ENCOUNTER — PATIENT MESSAGE (OUTPATIENT)
Dept: SLEEP MEDICINE | Facility: CLINIC | Age: 54
End: 2019-05-14

## 2019-05-14 ENCOUNTER — TELEPHONE (OUTPATIENT)
Dept: NEUROLOGY | Facility: CLINIC | Age: 54
End: 2019-05-14

## 2019-05-14 DIAGNOSIS — F32.A ANXIETY AND DEPRESSION: ICD-10-CM

## 2019-05-14 DIAGNOSIS — R41.3 MEMORY LOSS: Primary | ICD-10-CM

## 2019-05-14 DIAGNOSIS — F41.9 ANXIETY AND DEPRESSION: ICD-10-CM

## 2019-05-14 NOTE — TELEPHONE ENCOUNTER
----- Message from Nishi Nagy sent at 2019  3:45 PM CDT -----  Contact: PATIENT  Sakina Barrios  MRN: [id number]  : 1965  PCP: Donovan Mendez  Home Phone      [phone]  Work Phone      Not on file.  Mobile          [phone]      MESSAGE: Patient states that she is having trouble with her memory and it is causing her to become frustrated.  She states that her psychiatrist increased the Seroquel to 400 mg but it is still not helping.        Phone: [phone]

## 2019-05-16 NOTE — TELEPHONE ENCOUNTER
I will order Neuropsych testing in [place] to evaluate further. She should continue to see Psychiatry regarding her anxiety and depression, which could be, in part, contributing.     Please send copy of last visit note.

## 2019-05-22 ENCOUNTER — OFFICE VISIT (OUTPATIENT)
Dept: SLEEP MEDICINE | Facility: CLINIC | Age: 54
End: 2019-05-22
Payer: COMMERCIAL

## 2019-05-22 VITALS
WEIGHT: 163.56 LBS | DIASTOLIC BLOOD PRESSURE: 89 MMHG | SYSTOLIC BLOOD PRESSURE: 121 MMHG | HEART RATE: 91 BPM | BODY MASS INDEX: 30.88 KG/M2 | HEIGHT: 61 IN

## 2019-05-22 DIAGNOSIS — G47.33 OSA (OBSTRUCTIVE SLEEP APNEA): Primary | ICD-10-CM

## 2019-05-22 PROCEDURE — 99214 PR OFFICE/OUTPT VISIT, EST, LEVL IV, 30-39 MIN: ICD-10-PCS | Mod: S$GLB,,, | Performed by: NURSE PRACTITIONER

## 2019-05-22 PROCEDURE — 3074F SYST BP LT 130 MM HG: CPT | Mod: CPTII,S$GLB,, | Performed by: NURSE PRACTITIONER

## 2019-05-22 PROCEDURE — 99999 PR PBB SHADOW E&M-EST. PATIENT-LVL III: ICD-10-PCS | Mod: PBBFAC,,, | Performed by: NURSE PRACTITIONER

## 2019-05-22 PROCEDURE — 3008F BODY MASS INDEX DOCD: CPT | Mod: CPTII,S$GLB,, | Performed by: NURSE PRACTITIONER

## 2019-05-22 PROCEDURE — 3079F DIAST BP 80-89 MM HG: CPT | Mod: CPTII,S$GLB,, | Performed by: NURSE PRACTITIONER

## 2019-05-22 PROCEDURE — 99214 OFFICE O/P EST MOD 30 MIN: CPT | Mod: S$GLB,,, | Performed by: NURSE PRACTITIONER

## 2019-05-22 PROCEDURE — 99999 PR PBB SHADOW E&M-EST. PATIENT-LVL III: CPT | Mod: PBBFAC,,, | Performed by: NURSE PRACTITIONER

## 2019-05-22 PROCEDURE — 3008F PR BODY MASS INDEX (BMI) DOCUMENTED: ICD-10-PCS | Mod: CPTII,S$GLB,, | Performed by: NURSE PRACTITIONER

## 2019-05-22 PROCEDURE — 3074F PR MOST RECENT SYSTOLIC BLOOD PRESSURE < 130 MM HG: ICD-10-PCS | Mod: CPTII,S$GLB,, | Performed by: NURSE PRACTITIONER

## 2019-05-22 PROCEDURE — 3079F PR MOST RECENT DIASTOLIC BLOOD PRESSURE 80-89 MM HG: ICD-10-PCS | Mod: CPTII,S$GLB,, | Performed by: NURSE PRACTITIONER

## 2019-05-22 RX ORDER — QUETIAPINE FUMARATE 200 MG/1
200 TABLET, FILM COATED ORAL NIGHTLY
COMMUNITY

## 2019-05-22 NOTE — PROGRESS NOTES
"Sakina Barrios  was seen in follow-up for ANTONIO management and CPAP equipment check.     CHIEF COMPLAINT:  ANTONIO    INTERVAL HISTORY:    05/22/2019 ZARIA Robles NP: Continues to use CPAP. Reports resolution in snoring, fatigue, interrupted sleep, and daytime sleepiness when CPAP is used. Spends the night at her dad's house to help take care of him regularly and often forgets to bring PAP machine with her or when at home while winding down at night on recliner doing "word search" exercise she falls asleep on recliner and stays there most night and several hours has passed before realizing she's fallen asleep. Biggest concern today is that she feels more agitated lately. Her psychiatrist increased her Seroquel but she does not feel like this has improved. She denies SI/HI. She has upcoming appointment with psych early June. She is also seeing "memory doctor in [place]" on 05/23/19.     APAP 6 - 20 cm, TH: 789.3, BH: 919.5 , > 4 hours: 16/30, 30-day ave: 4 hours, MF: 99 %, Predicted AHI: 3.1 , PB: 0 %, 90%tile pressure: 9.3 cm    01/22/2019 ZARIA Robles NP: Pt returns after set up of PAP machine on 11/20/2018 at [facility]. Pt sleep complaints of snoring, daytime fatigue, interrupted sleep, and daytime sleepiness improving with PAP use. Denies oral/nasal drying with Wisp mask. Denies mask leaks. Denies rainout.  Denies pressure intolerance or air hunger. Denies congestion. Denies aerophagia.     Had been regularly using PAP machine since set up until she had accident falling off swivel chair necessitating hospitalization at [facility], during which time she did not use her own unit. However, when she is home she regularly uses machine. Pt continues to see neurologist for concussion management.     APAP 6 - 20 cm, TH: 288, BH: 319.9, > 4 hours: 19/30, 30-day ave: 4.5 hours, MF: 99%, Predicted AHI: 3.3, PB: 0%, 90%tile pressure: 8.2 cm      11/15/2018 ZARIA Robles NP: Since last clinic visit, pt has completed inconclusive HST and recommended " confirmatory PSG.  Discussed sleep study results in detail. Patient complaints of snoring, daytime fatigue, interrupted sleep, daytime sleepiness, and memory impairment remain the same. ESS 5.        06/28/2018 ZARIA Robles NP: Initial HISTORY OF PRESENT ILLNESS: Sakina Barrios is a 54 y.o. female is here for sleep evaluation.       Accompanied by friend today     Patient complaints include: snoring, daytime fatigue, interrupted sleep, daytime sleepiness, and memory impairment  Reports difficulty initiating sleep and staying asleep - per pt managed by psychiatrist    Memory impairment worse with Neurontin + Flexeril combo for pain management; since stopping Flexeril, memory issues improving     Under psyciatry care, including seroquel management     Of note PSG ordered denied by insurance, HST ordered by rheum already authorized by insurance but not yet scheduled.     Denies symptoms of restless legs or kicking during sleep.      EPWORTH SLEEPINESS SCALE 6/27/2018   Sitting and reading 1   Watching TV 1   Sitting, inactive in a public place (e.g. a theatre or a meeting) 1   As a passenger in a car for an hour without a break 1   Lying down to rest in the afternoon when circumstances permit 1   Sitting and talking to someone 0   Sitting quietly after a lunch without alcohol 1   In a car, while stopped for a few minutes in traffic 0   Total score 6       SLEEP ROUTINE:  Sleep Clinic New Patient 6/27/2018   What time do you go to bed on a week day? (Give a range) Between 11 pm - 1 am   What time do you go to bed on a day off? (Give a range) Between 11 pm - 1 am   How long does it take you to fall asleep? (Give a range) 1 hour   On average, how many times per night do you wake up? 1-2 times   How long does it take you to fall back into sleep? (Give a range) About 10 minutes   What time do you wake up to start your day on a week day? (Give a range) 7   What time do you wake up to start your day on a day off? (Give a  range) 7   What time do you get out of bed? (Give a range) 7:30   On average, how many hours do you sleep? 5-6   On average, how many naps do you take per day? None   Rate your sleep quality from 0 to 5 (0-poor, 5-great). 2   Have you experienced:  Weight gain?   How much weight have you lost or gained (in lbs.) in the last year?  Gained 15-20   On average, how many times per night do you go to the bathroom?  0   Have you ever had a sleep study/CPAP machine/surgery for sleep apnea? No   Have you ever had a CPAP machine for sleep apnea? No   Have you ever had surgery for sleep apnea? No      2018  lb. The overall AHI was 4 and overall RDI was 10. The oxygen dante was 82.5% and % time < 90% SpO2 was 2.8%.  10/29/2018  lb. The overall AHI was 12.8 with an oxygen dante of 84.0%. The supine AHI was 15.9 and the REM AHI was 4.7.       PAST MEDICAL HISTORY:    Active Ambulatory Problems     Diagnosis Date Noted    Menopausal symptoms 2012    Cervical myofascial pain syndrome 2018    Right sided sciatica 2018    Neck pain 2018    Lower back pain 2018    Bilateral hip pain 2018    Cervical paraspinal muscle spasm 2018    ANTONIO (obstructive sleep apnea)     Memory loss 2018    Anxiety and depression 2018    Fibromyalgia 2018    HTN (hypertension) 2018    Hyperlipemia 2018     Resolved Ambulatory Problems     Diagnosis Date Noted    Myalgia 2018     Past Medical History:   Diagnosis Date    Carpal tunnel syndrome     Hyperlipemia     Hypertension     Mental disorder                 PAST SURGICAL HISTORY:    Past Surgical History:   Procedure Laterality Date    CARPAL TUNNEL RELEASE      Bilateral     SECTION      x3    HYSTERECTOMY  approx 40 y/o    BHUMI BSO-pain    NASAL SINUS SURGERY           FAMILY HISTORY:                Family History   Problem Relation Age of Onset    Hypertension Father      Diabetes Father     Coronary artery disease Father     Hypertension Mother     Breast cancer Mother 72    Colon cancer Neg Hx     Ovarian cancer Neg Hx        SOCIAL HISTORY:          Tobacco:   Social History     Tobacco Use   Smoking Status Current Every Day Smoker    Packs/day: 1.00    Years: 30.00    Pack years: 30.00   Smokeless Tobacco Never Used       Alcohol use:    Social History     Substance and Sexual Activity   Alcohol Use No    Comment: No                 ALLERGIES:  Review of patient's allergies indicates:  No Known Allergies    CURRENT MEDICATIONS:    Current Outpatient Medications   Medication Sig Dispense Refill    alprazolam (XANAX) 1 MG tablet 3 (three) times daily as needed.   0    aspirin 325 MG tablet Take 325 mg by mouth once daily.      cholecalciferol, vitamin D3, (VITAMIN D3) 5,000 unit Tab Take 5,000 Units by mouth once daily.      CRESTOR 20 mg tablet Take 20 mg by mouth every evening.       DULoxetine (CYMBALTA) 60 MG capsule Take 60 mg by mouth every morning.  3    gabapentin (NEURONTIN) 300 MG capsule Take 1 capsule (300 mg total) by mouth 3 (three) times daily. 90 capsule 11    omeprazole (PRILOSEC) 40 MG capsule Take 1 capsule by mouth once daily.      potassium chloride SA (K-DUR,KLOR-CON) 10 MEQ tablet Take 10 mEq by mouth 2 (two) times daily.  3    QUEtiapine (SEROQUEL) 200 MG Tab quetiapine 200 mg tablet   take 2 tablet by mouth at bedtime      scopolamine (TRANSDERM-SCOP) 1.3-1.5 mg (1 mg over 3 days) Transderm-Scop 1.5 mg transdermal patch (1 mg over 3 days)   Apply behind ear 4 hours before event and change every 3 days      tiZANidine (ZANAFLEX) 6 mg capsule Take 6 mg by mouth daily as needed for Muscle spasms.      vitamin E 400 UNIT capsule Take 400 Units by mouth once daily.       No current facility-administered medications for this visit.                   REVIEW OF SYSTEMS:     Sleep related symptoms as per HPI.  Sleep Clinic ROS  6/27/2018  "  Difficulty breathing through the nose?  Sometimes   Sore throat or dry mouth in the morning? Yes   Irregular or very fast heart beat?  Sometimes   Shortness of breath?  No   Acid reflux? Yes   Body aches and pains?  Yes   Morning headaches? Yes   Dizziness? Sometimes   Mood changes?  Yes   Do you exercise?  Sometimes   Do you feel like moving your legs a lot?  Sometimes     Otherwise, a balance of systems reviewed is negative.          PHYSICAL EXAM:  /89   Pulse 91   Ht 5' 1" (1.549 m)   Wt 74.2 kg (163 lb 9.3 oz)   BMI 30.91 kg/m²   GENERAL: Overweight development, well groomed                                               ASSESSMENT:    Obstructive sleep apnea, mild by AHI. The patient symptomatically has snoring, daytime fatigue, interrupted sleep, and daytime sleepiness improving with CPAP. The patient is experiencing symptomatic benefit. Medical co-morbidities: systemic HTN, fibromyalgia, anxiety, GERD, and obesity.  This warrants treatment for obstructive sleep apnea.    Insomnia, chronic, managed by psychiatrist     PLAN:    Treatment:  -continue APAP. Increase mask-on time. Bring PAP machine when travelling. If at home, set alarm on cell phone nightly to remind her to go to bedroom to sleep with machine  -RTC 6 - 9 months  -Attend already scheduled appointment with external psychiatry (Dr. Beaulieu)    Education: During our discussion today, we talked about the etiology of obstructive sleep apnea as well as the potential ramifications of untreated sleep apnea, which could include daytime sleepiness, hypertension, heart disease and/or stroke. We discussed potential treatment options, which could include weight loss, body positioning, continuous positive airway pressure (CPAP), OA, EPAP, or referral for surgical consideration.     Precautions: The patient was advised to abstain from driving should they feel sleepy  or drowsy.               "

## 2019-06-25 ENCOUNTER — TELEPHONE (OUTPATIENT)
Dept: NEUROLOGY | Facility: CLINIC | Age: 54
End: 2019-06-25

## 2019-06-25 NOTE — TELEPHONE ENCOUNTER
Neuropsych testing suggested that her severe anxiety and depression were the cause of her memory loss and cognitive complaints. No signs of dementia shown. I suggest that she see Psychiatry if not currently seeing someone, as well as a counselor for her mood complaints. Once her mood is more controlled, she should see an improvement to her memory.

## 2019-06-25 NOTE — TELEPHONE ENCOUNTER
Patient notified, able to verbalize understanding. Will discuss further at scheduled appointment tomorrow.

## 2019-06-26 ENCOUNTER — PROCEDURE VISIT (OUTPATIENT)
Dept: NEUROLOGY | Facility: CLINIC | Age: 54
End: 2019-06-26
Payer: COMMERCIAL

## 2019-06-26 ENCOUNTER — OFFICE VISIT (OUTPATIENT)
Dept: NEUROLOGY | Facility: CLINIC | Age: 54
End: 2019-06-26
Payer: COMMERCIAL

## 2019-06-26 ENCOUNTER — PATIENT MESSAGE (OUTPATIENT)
Dept: SURGERY | Facility: CLINIC | Age: 54
End: 2019-06-26

## 2019-06-26 ENCOUNTER — LAB VISIT (OUTPATIENT)
Dept: LAB | Facility: HOSPITAL | Age: 54
End: 2019-06-26
Attending: NURSE PRACTITIONER
Payer: COMMERCIAL

## 2019-06-26 ENCOUNTER — TELEPHONE (OUTPATIENT)
Dept: OBSTETRICS AND GYNECOLOGY | Facility: CLINIC | Age: 54
End: 2019-06-26

## 2019-06-26 VITALS
BODY MASS INDEX: 29.13 KG/M2 | DIASTOLIC BLOOD PRESSURE: 80 MMHG | SYSTOLIC BLOOD PRESSURE: 114 MMHG | RESPIRATION RATE: 16 BRPM | WEIGHT: 158.31 LBS | HEART RATE: 90 BPM | HEIGHT: 62 IN

## 2019-06-26 DIAGNOSIS — F32.A ANXIETY AND DEPRESSION: ICD-10-CM

## 2019-06-26 DIAGNOSIS — M79.604 BILATERAL LEG PAIN: ICD-10-CM

## 2019-06-26 DIAGNOSIS — M79.18 CERVICAL MYOFASCIAL PAIN SYNDROME: ICD-10-CM

## 2019-06-26 DIAGNOSIS — M62.838 CERVICAL PARASPINAL MUSCLE SPASM: ICD-10-CM

## 2019-06-26 DIAGNOSIS — F44.9 CONVERSION DISORDER: ICD-10-CM

## 2019-06-26 DIAGNOSIS — I10 HYPERTENSION, UNSPECIFIED TYPE: ICD-10-CM

## 2019-06-26 DIAGNOSIS — R26.89 FUNCTIONAL GAIT ABNORMALITY: ICD-10-CM

## 2019-06-26 DIAGNOSIS — M79.10 MYALGIA: ICD-10-CM

## 2019-06-26 DIAGNOSIS — G47.33 OSA (OBSTRUCTIVE SLEEP APNEA): ICD-10-CM

## 2019-06-26 DIAGNOSIS — F45.0 SOMATIZATION DISORDER: ICD-10-CM

## 2019-06-26 DIAGNOSIS — F41.9 ANXIETY AND DEPRESSION: ICD-10-CM

## 2019-06-26 DIAGNOSIS — M79.7 FIBROMYALGIA: ICD-10-CM

## 2019-06-26 DIAGNOSIS — M54.12 CERVICAL RADICULAR PAIN: ICD-10-CM

## 2019-06-26 DIAGNOSIS — M79.18 CERVICAL MYOFASCIAL PAIN SYNDROME: Primary | ICD-10-CM

## 2019-06-26 DIAGNOSIS — M79.605 BILATERAL LEG PAIN: ICD-10-CM

## 2019-06-26 DIAGNOSIS — M54.2 NECK PAIN: ICD-10-CM

## 2019-06-26 DIAGNOSIS — Z12.31 ENCOUNTER FOR SCREENING MAMMOGRAM FOR BREAST CANCER: Primary | ICD-10-CM

## 2019-06-26 DIAGNOSIS — R41.3 MEMORY LOSS: ICD-10-CM

## 2019-06-26 DIAGNOSIS — E78.5 HYPERLIPIDEMIA, UNSPECIFIED HYPERLIPIDEMIA TYPE: ICD-10-CM

## 2019-06-26 DIAGNOSIS — M54.50 LUMBAR PAIN: ICD-10-CM

## 2019-06-26 LAB
25(OH)D3+25(OH)D2 SERPL-MCNC: 54 NG/ML (ref 30–96)
ALBUMIN SERPL BCP-MCNC: 4.2 G/DL (ref 3.5–5.2)
ALP SERPL-CCNC: 128 U/L (ref 55–135)
ALT SERPL W/O P-5'-P-CCNC: 15 U/L (ref 10–44)
ANION GAP SERPL CALC-SCNC: 14 MMOL/L (ref 8–16)
AST SERPL-CCNC: 20 U/L (ref 10–40)
BASOPHILS # BLD AUTO: 0.07 K/UL (ref 0–0.2)
BASOPHILS NFR BLD: 0.5 % (ref 0–1.9)
BILIRUB SERPL-MCNC: 0.3 MG/DL (ref 0.1–1)
BUN SERPL-MCNC: 11 MG/DL (ref 6–20)
CALCIUM SERPL-MCNC: 10.2 MG/DL (ref 8.7–10.5)
CHLORIDE SERPL-SCNC: 103 MMOL/L (ref 95–110)
CO2 SERPL-SCNC: 26 MMOL/L (ref 23–29)
CREAT SERPL-MCNC: 0.7 MG/DL (ref 0.5–1.4)
DIFFERENTIAL METHOD: ABNORMAL
EOSINOPHIL # BLD AUTO: 0.1 K/UL (ref 0–0.5)
EOSINOPHIL NFR BLD: 0.7 % (ref 0–8)
ERYTHROCYTE [DISTWIDTH] IN BLOOD BY AUTOMATED COUNT: 15.7 % (ref 11.5–14.5)
EST. GFR  (AFRICAN AMERICAN): >60 ML/MIN/1.73 M^2
EST. GFR  (NON AFRICAN AMERICAN): >60 ML/MIN/1.73 M^2
GLUCOSE SERPL-MCNC: 86 MG/DL (ref 70–110)
HCT VFR BLD AUTO: 42.9 % (ref 37–48.5)
HGB BLD-MCNC: 14 G/DL (ref 12–16)
IMM GRANULOCYTES # BLD AUTO: 0.03 K/UL (ref 0–0.04)
IMM GRANULOCYTES NFR BLD AUTO: 0.2 % (ref 0–0.5)
LYMPHOCYTES # BLD AUTO: 3 K/UL (ref 1–4.8)
LYMPHOCYTES NFR BLD: 22.5 % (ref 18–48)
MCH RBC QN AUTO: 26.5 PG (ref 27–31)
MCHC RBC AUTO-ENTMCNC: 32.6 G/DL (ref 32–36)
MCV RBC AUTO: 81 FL (ref 82–98)
MONOCYTES # BLD AUTO: 0.8 K/UL (ref 0.3–1)
MONOCYTES NFR BLD: 6 % (ref 4–15)
NEUTROPHILS # BLD AUTO: 9.2 K/UL (ref 1.8–7.7)
NEUTROPHILS NFR BLD: 70.1 % (ref 38–73)
NRBC BLD-RTO: 0 /100 WBC
PLATELET # BLD AUTO: 210 K/UL (ref 150–350)
PMV BLD AUTO: 11 FL (ref 9.2–12.9)
POTASSIUM SERPL-SCNC: 3.2 MMOL/L (ref 3.5–5.1)
PROT SERPL-MCNC: 7.9 G/DL (ref 6–8.4)
RBC # BLD AUTO: 5.29 M/UL (ref 4–5.4)
SODIUM SERPL-SCNC: 143 MMOL/L (ref 136–145)
T4 SERPL-MCNC: 6 UG/DL (ref 4.5–11.5)
TSH SERPL DL<=0.005 MIU/L-ACNC: 1.3 UIU/ML (ref 0.4–4)
VIT B12 SERPL-MCNC: 608 PG/ML (ref 210–950)
WBC # BLD AUTO: 13.19 K/UL (ref 3.9–12.7)

## 2019-06-26 PROCEDURE — 99215 OFFICE O/P EST HI 40 MIN: CPT | Mod: S$GLB,,, | Performed by: NURSE PRACTITIONER

## 2019-06-26 PROCEDURE — 20553 PR INJECT TRIGGER POINTS, > 3: ICD-10-PCS | Mod: S$GLB,,, | Performed by: NURSE PRACTITIONER

## 2019-06-26 PROCEDURE — 99215 PR OFFICE/OUTPT VISIT, EST, LEVL V, 40-54 MIN: ICD-10-PCS | Mod: S$GLB,,, | Performed by: NURSE PRACTITIONER

## 2019-06-26 PROCEDURE — 99999 PR PBB SHADOW E&M-EST. PATIENT-LVL V: ICD-10-PCS | Mod: PBBFAC,,, | Performed by: NURSE PRACTITIONER

## 2019-06-26 PROCEDURE — 85025 COMPLETE CBC W/AUTO DIFF WBC: CPT

## 2019-06-26 PROCEDURE — 80053 COMPREHEN METABOLIC PANEL: CPT

## 2019-06-26 PROCEDURE — 3079F DIAST BP 80-89 MM HG: CPT | Mod: CPTII,S$GLB,, | Performed by: NURSE PRACTITIONER

## 2019-06-26 PROCEDURE — 3079F PR MOST RECENT DIASTOLIC BLOOD PRESSURE 80-89 MM HG: ICD-10-PCS | Mod: CPTII,S$GLB,, | Performed by: NURSE PRACTITIONER

## 2019-06-26 PROCEDURE — 99999 PR PBB SHADOW E&M-EST. PATIENT-LVL V: CPT | Mod: PBBFAC,,, | Performed by: NURSE PRACTITIONER

## 2019-06-26 PROCEDURE — 82607 VITAMIN B-12: CPT

## 2019-06-26 PROCEDURE — 84443 ASSAY THYROID STIM HORMONE: CPT

## 2019-06-26 PROCEDURE — 3074F SYST BP LT 130 MM HG: CPT | Mod: CPTII,S$GLB,, | Performed by: NURSE PRACTITIONER

## 2019-06-26 PROCEDURE — 3074F PR MOST RECENT SYSTOLIC BLOOD PRESSURE < 130 MM HG: ICD-10-PCS | Mod: CPTII,S$GLB,, | Performed by: NURSE PRACTITIONER

## 2019-06-26 PROCEDURE — 3008F PR BODY MASS INDEX (BMI) DOCUMENTED: ICD-10-PCS | Mod: CPTII,S$GLB,, | Performed by: NURSE PRACTITIONER

## 2019-06-26 PROCEDURE — 84436 ASSAY OF TOTAL THYROXINE: CPT

## 2019-06-26 PROCEDURE — 82306 VITAMIN D 25 HYDROXY: CPT

## 2019-06-26 PROCEDURE — 20553 NJX 1/MLT TRIGGER POINTS 3/>: CPT | Mod: S$GLB,,, | Performed by: NURSE PRACTITIONER

## 2019-06-26 PROCEDURE — 36415 COLL VENOUS BLD VENIPUNCTURE: CPT

## 2019-06-26 PROCEDURE — 3008F BODY MASS INDEX DOCD: CPT | Mod: CPTII,S$GLB,, | Performed by: NURSE PRACTITIONER

## 2019-06-26 RX ORDER — TIZANIDINE 4 MG/1
8 TABLET ORAL 3 TIMES DAILY PRN
COMMUNITY
End: 2021-04-05

## 2019-06-26 NOTE — TELEPHONE ENCOUNTER
----- Message from Ailyn Marcum MA sent at 6/26/2019 12:49 PM CDT -----  Contact: Self      ----- Message -----  From: Emilie Yanez  Sent: 6/26/2019  12:42 PM  To: Johnathon [facility]ENDERSON Staff    Please link mammo orders

## 2019-06-26 NOTE — PROGRESS NOTES
"HPI:  Sakina Barrios is a 54 y.o. female female with cervical and lumbar complaints, as well as fibromyalgia, per Rheumatology. Prior complaint of memory loss. Anxiety prominent with other somatic symptoms like dizziness, visual changes without cause found. She has HTN and HLD, as well as ANTONIO. She is a current every day smoker. CHI from a fall in 1/2019 with a subarachnoid hemorrhage and admit to [facility] with some post-concussive complaints afterwards.     She presents today for a follow up visit. She completed Neuropsych testing, which suggested that her anxiety and depression were contributing to her memory loss. Memory remains quite challenged at times. No issues driving or cooking; however. No signs of executive dysfunction. She continues with much anxiety and depression. She verbalized much frustration with her family members not endorsing or paying attention to her multiple physical complaints. Marital conflict mentioned at her last visit. She is not currently in counseling.     She completed a repeat MRI Brain since her last visit, which was unremarkable. No SAH seen.     She had a fall since her last visit. She experiences gait imbalance with increased stress. She is working on this with PT, who she sees for "pain from head to toe".     She received cervical TPI's at her last visit for increased neck pain, which was effective, though she cannot recall how long the effect lasted her. Neck pain is returned at her visit today.     Lumbar pain worse since her recent fall. She has pain radiating into her legs.     ROS is floridly positive and is not likely reliable  Review of Systems   Unable to perform ROS: Other       I have reviewed all of this patient's past medical and surgical histories as well as family and social histories and active allergies and medications as documented in the electronic medical record.    Exam:  Gen Appearance, well developed/nourished in no apparent distress  CV: " 2+ distal pulses with no edema or swelling  Neuro:  MS: Awake, alert, oriented to place, person, time, situation. Sustains attention. Recent recall is intact/remote memory intact, Language is full to spontaneous speech/repetition/naming/comprehension. Fund of Knowledge is full  There is some pyschomotor slowing and she appears anxious at times.   Names 2/2 presidents with anxiety interfering with effort  Draws clock well with mention that she could not do this for PCP  CN: Optic discs are flat with normal vasculature, PERRL, Extraoccular movements and visual fields are full. Normal facial sensation and strength, Hearing symmetric, Tongue and Palate are midline and strong. Shoulder Shrug symmetric and strong.  Motor: Normal bulk, tone, no abnormal movements. 5/5 strength bilateral upper/lower extremities with 2+ reflexes and not clonus  Sensory: symmetric to temp, and vibration,  Romberg negative  Cerebellar: Finger-nose,Heal-shin, Rapid alternating movements intact  Gait: atasia/abasia  MSK survey: bilateral palpable trigger points to trapezius    Imaging:   MRI Brain 5/2019:   MRI of the brain without contrast dated May 2, 2019.    There is no MRI evidence of an acute intracranial abnormality.  No evidence of a focal infarct, hemorrhage, mass or midline shift.    No abnormal signal alteration on diffusion weighted images.  Vascular structures appear normal.    No visualized significant paranasal sinus disease.      Impression       No MRI evidence of an acute intracranial abnormality.     CT Head 1/2/2019:   1. Small amount of residual SAH along the cortical sulci of the left frontal parietal complex in the high convexity. A large portion of the previously identified SAH has resolved.     MRI Brain 10/2017: Atrophy but no acute changes  10/2017 B12/RPR normal    MRI L-spine 3/2018: Minimal desiccation of the L3 disc with a questionable small left paracentral disc bulge at L3-L4.  No significant spinal canal or  foraminal encroachment.    3/2018 C-spine and Hip X-rays:   Cervical spine, AP and lateral: Vertebral body alignment, heights and disc spaces are within normal limits.  The prevertebral soft tissues are normal.  No fracture or subluxation is seen    2018 CMP, CBC, B12, LORRIE, RA factor ESR reviewed.  Patient was told to follow up with PCP for mildly low K and rheumatology for elevated ESR  Carotid US 10/2017:  No stenosis    2018 Creatinine normal     Assessment/Plan: Sakina Barrios is a 54 y.o. female with  memory loss. Anxiety prominent with other somatic symptoms like dizziness, visual changes without cause found.  Was found to have a cerebral bleed after fall out of a chair this month. Sounds like a traumatic subarachnoid hemorrhage.     I recommend:   1. Continue PT for cervicogenic headaches, lumbar pain, and neck pain. Dry needling is helpful. She may continue to work on her functional gait issue at PT as well. I suspect this to be conversion disorder related, given her exam today, but I will evaluate her C-spine and L-spine, given her increased pain.   2. Repeat cervical TPI's today.   3. Repeat MRI L-spine and order MRI C-spine to evaluate for changes, given increased pain. She could have some cervical and lumbar radicular pain; however, I believe that she does have an overlying fibromyalgia. Her pain is historically worse with increased mood issues. Very mild findings on bilateral hip X-rays and MRI L-spine, and C-spine X-rays were overall normal-nothing to explain her diffuse, ongoing MSK complaints, suggestive of fibromyalgia, diagnosed by Rheumatology prior.  4. CBC, CMP, TSH, T4, Vitamin D, B12, given her myalgia  5. Refer to pain management for neck pain and lumbar pain.   6. She is already taking Gabapentin, as well as Cymbalta (for mood), and Tizanidine without relief.   7. Neuropsych testing suggested anxiety and depression as the cause of her memory loss. I advised that she work with Psychiatry to  "better manage her mood issues. Prior to CHI-Memory loss resolved with resolution of anxiety and mood issues. Could have incidental atrophy on MRI Brain.   8. Advised to get counseling for anxiety and depression.   9. She was released from follow up with Neurosurgery in late 1/2019 regarding her SAH.  10. Continue Gabapentin 300 mg tid, as she has greater pain relief with this.  Prior sedation attributed to concurrent use of Flexeril per patient. No longer taking Flexeril.  11. Advised continued compliance with CPAP for her ANTONIO.   12. Continue compound cream for cervical complaints.   13. Chronic Migraine noted for years-resolved currently. 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 Will avoid adding meds given her complaint of sleepiness prior.      RTC 4 months    "note not shared"    >45 minutes spent with patient discussing plan of care, diagnostic findings, etc.   "

## 2019-07-02 ENCOUNTER — HOSPITAL ENCOUNTER (OUTPATIENT)
Dept: RADIOLOGY | Facility: HOSPITAL | Age: 54
Discharge: HOME | End: 2019-07-02
Attending: NURSE PRACTITIONER
Payer: COMMERCIAL

## 2019-07-02 DIAGNOSIS — M79.604 BILATERAL LEG PAIN: ICD-10-CM

## 2019-07-02 DIAGNOSIS — M54.12 CERVICAL RADICULAR PAIN: ICD-10-CM

## 2019-07-02 DIAGNOSIS — M54.50 LUMBAR PAIN: ICD-10-CM

## 2019-07-02 DIAGNOSIS — M54.2 NECK PAIN: ICD-10-CM

## 2019-07-02 DIAGNOSIS — M79.605 BILATERAL LEG PAIN: ICD-10-CM

## 2019-07-02 PROCEDURE — 72148 MRI LUMBAR SPINE W/O DYE: CPT | Mod: 26,,, | Performed by: RADIOLOGY

## 2019-07-02 PROCEDURE — 72148 MRI LUMBAR SPINE W/O DYE: CPT | Mod: TC

## 2019-07-02 PROCEDURE — 72141 MRI NECK SPINE W/O DYE: CPT | Mod: 26,,, | Performed by: RADIOLOGY

## 2019-07-02 PROCEDURE — 72141 MRI NECK SPINE W/O DYE: CPT | Mod: TC

## 2019-07-02 PROCEDURE — 72141 MRI CERVICAL SPINE WITHOUT CONTRAST: ICD-10-PCS | Mod: 26,,, | Performed by: RADIOLOGY

## 2019-07-02 PROCEDURE — 72148 MRI LUMBAR SPINE WITHOUT CONTRAST: ICD-10-PCS | Mod: 26,,, | Performed by: RADIOLOGY

## 2019-07-15 RX ORDER — BISACODYL 5 MG
20 TABLET, DELAYED RELEASE (ENTERIC COATED) ORAL ONCE
Qty: 4 TABLET | Refills: 0 | Status: ON HOLD | OUTPATIENT
Start: 2019-07-24 | End: 2019-07-25 | Stop reason: HOSPADM

## 2019-07-15 RX ORDER — SODIUM, POTASSIUM,MAG SULFATES 17.5-3.13G
354 SOLUTION, RECONSTITUTED, ORAL ORAL ONCE
Qty: 354 ML | Refills: 0 | Status: ON HOLD | OUTPATIENT
Start: 2019-07-24 | End: 2019-07-25 | Stop reason: HOSPADM

## 2019-07-16 ENCOUNTER — HOSPITAL ENCOUNTER (OUTPATIENT)
Dept: PREADMISSION TESTING | Facility: HOSPITAL | Age: 54
Discharge: HOME | End: 2019-07-16
Attending: COLON & RECTAL SURGERY
Payer: COMMERCIAL

## 2019-07-16 ENCOUNTER — ANESTHESIA EVENT (OUTPATIENT)
Dept: ENDOSCOPY | Facility: HOSPITAL | Age: 54
End: 2019-07-16
Payer: COMMERCIAL

## 2019-07-16 DIAGNOSIS — Z12.11 COLON CANCER SCREENING: Primary | ICD-10-CM

## 2019-07-16 NOTE — DISCHARGE INSTRUCTIONS
Colonoscopy Outpatient procedure instructions    Prep Review  Follow instructions as written on Dr. Williamson's Colonoscopy Prep Sheet  Dr. Williamson patients have nothing to eat or drink after morning prep is complete      Take medications as instructed by your doctor.    Wear something comfortable that is easy for you to take off and put on.   Do not wear any makeup, jewelry, or body piercings. Leave valuables at home or let your family member keep them for you. Do not bring them to the Surgery area.     Date/Day of Procedure: Thursday 7/25/19  /  Arrival time: Someone will call you the workday day before the procedure  to give you an arrival time   If asked to report to the hospital before 7:00 am report to the Emergency Room.  If asked to report to the hospital at 7:00 a.m. or later report to Patient Registration  It is not necessary to report earlier than the time you are told.   Ignore any automated/computer generated calls telling to what time to report to the hospital.   Plan to be at the hospital for about 4 hours, however, it could be longer.

## 2019-07-16 NOTE — ANESTHESIA PREPROCEDURE EVALUATION
07/16/2019  Sakina Barrios is a 54 y.o., female.    Pre-op Assessment    I have reviewed the Patient Summary Reports.    I have reviewed the Nursing Notes.   I have reviewed the Medications.     Review of Systems  Anesthesia Hx:  No problems with previous Anesthesia  History of prior surgery of interest to airway management or planning:  Denies Personal Hx of Anesthesia complications.   Social:  Smoker, No Alcohol Use 1ppd smoker   Hematology/Oncology:  Hematology Normal   Oncology Normal     EENT/Dental:EENT/Dental Normal   Cardiovascular:   Exercise tolerance: good Hypertension, well controlled    Pulmonary:   Sleep Apnea, CPAP    Renal/:  Renal/ Normal     Hepatic/GI:   GERD, well controlled    Musculoskeletal:  Musculoskeletal Normal    Neurological:   Neuromuscular Disease, Jan 2019 fall with minor SAH (no surgery) with some intermittent memory disturbances, Sciatica, fibromyalgia, cervical myofascia pain syndrome with planned cervical injections planned, FROM.   Endocrine:  Endocrine Normal    Dermatological:  Skin Normal    Psych:   Psychiatric History          Physical Exam  General:  Obesity    Airway/Jaw/Neck:  Airway Findings: Mouth Opening: Normal Tongue: Normal  General Airway Assessment: Adult  Mallampati: II  TM Distance: Normal, at least 6 cm  Jaw/Neck Findings:  Neck ROM: Normal ROM      Dental:  Dental Findings: Upper Dentures         Mental Status:  Mental Status Findings:  Cooperative, Alert and Oriented         Anesthesia Plan  Type of Anesthesia, risks & benefits discussed:  Anesthesia Type:  general  Patient's Preference:   Intra-op Monitoring Plan: standard ASA monitors  Intra-op Monitoring Plan Comments:   Post Op Pain Control Plan:   Post Op Pain Control Plan Comments:   Induction:   IV  Beta Blocker:  Patient is not currently on a Beta-Blocker (No further documentation  required).       Informed Consent: Patient understands risks and agrees with Anesthesia plan.  Questions answered. Anesthesia consent signed with patient.  ASA Score: 2     Day of Surgery Review of History & Physical: I have interviewed and examined the patient. I have reviewed the patient's H&P dated: 7/25/19. There are no significant changes.  H&P update referred to the surgeon.

## 2019-07-25 ENCOUNTER — ANESTHESIA (OUTPATIENT)
Dept: ENDOSCOPY | Facility: HOSPITAL | Age: 54
End: 2019-07-25
Payer: COMMERCIAL

## 2019-07-25 ENCOUNTER — HOSPITAL ENCOUNTER (OUTPATIENT)
Facility: HOSPITAL | Age: 54
Discharge: HOME | End: 2019-07-25
Attending: COLON & RECTAL SURGERY | Admitting: COLON & RECTAL SURGERY
Payer: COMMERCIAL

## 2019-07-25 VITALS
OXYGEN SATURATION: 95 % | TEMPERATURE: 97 F | DIASTOLIC BLOOD PRESSURE: 60 MMHG | HEART RATE: 66 BPM | RESPIRATION RATE: 18 BRPM | SYSTOLIC BLOOD PRESSURE: 117 MMHG

## 2019-07-25 DIAGNOSIS — Z12.11 COLON CANCER SCREENING: ICD-10-CM

## 2019-07-25 PROCEDURE — 63600175 PHARM REV CODE 636 W HCPCS: Performed by: NURSE ANESTHETIST, CERTIFIED REGISTERED

## 2019-07-25 PROCEDURE — 37000008 HC ANESTHESIA 1ST 15 MINUTES: Performed by: COLON & RECTAL SURGERY

## 2019-07-25 PROCEDURE — 27201089 HC SNARE, DISP (ANY): Performed by: COLON & RECTAL SURGERY

## 2019-07-25 PROCEDURE — 88305 TISSUE EXAM BY PATHOLOGIST: CPT | Mod: 26,,, | Performed by: PATHOLOGY

## 2019-07-25 PROCEDURE — 63600175 PHARM REV CODE 636 W HCPCS: Performed by: COLON & RECTAL SURGERY

## 2019-07-25 PROCEDURE — 00811 ANES LWR INTST NDSC NOS: CPT | Mod: 33 | Performed by: NURSE ANESTHETIST, CERTIFIED REGISTERED

## 2019-07-25 PROCEDURE — 27201012 HC FORCEPS, HOT/COLD, DISP: Performed by: COLON & RECTAL SURGERY

## 2019-07-25 PROCEDURE — 88305 TISSUE EXAM BY PATHOLOGIST: CPT | Performed by: PATHOLOGY

## 2019-07-25 PROCEDURE — 45385 COLONOSCOPY W/LESION REMOVAL: CPT | Performed by: COLON & RECTAL SURGERY

## 2019-07-25 PROCEDURE — 37000009 HC ANESTHESIA EA ADD 15 MINS: Performed by: COLON & RECTAL SURGERY

## 2019-07-25 PROCEDURE — 45385 PR COLONOSCOPY,REMV LESN,SNARE: ICD-10-PCS | Mod: 33,,, | Performed by: COLON & RECTAL SURGERY

## 2019-07-25 PROCEDURE — 45385 COLONOSCOPY W/LESION REMOVAL: CPT | Mod: 33,,, | Performed by: COLON & RECTAL SURGERY

## 2019-07-25 PROCEDURE — 45380 COLONOSCOPY AND BIOPSY: CPT | Mod: 59,33,, | Performed by: COLON & RECTAL SURGERY

## 2019-07-25 PROCEDURE — 88305 TISSUE SPECIMEN TO PATHOLOGY - SURGERY: ICD-10-PCS | Mod: 26,,, | Performed by: PATHOLOGY

## 2019-07-25 PROCEDURE — 45380 COLONOSCOPY AND BIOPSY: CPT | Performed by: COLON & RECTAL SURGERY

## 2019-07-25 PROCEDURE — 45380 PR COLONOSCOPY,BIOPSY: ICD-10-PCS | Mod: 59,33,, | Performed by: COLON & RECTAL SURGERY

## 2019-07-25 RX ORDER — LIDOCAINE HCL/PF 100 MG/5ML
SYRINGE (ML) INTRAVENOUS
Status: DISCONTINUED | OUTPATIENT
Start: 2019-07-25 | End: 2019-07-25

## 2019-07-25 RX ORDER — PROPOFOL 10 MG/ML
INJECTION, EMULSION INTRAVENOUS
Status: DISCONTINUED | OUTPATIENT
Start: 2019-07-25 | End: 2019-07-25

## 2019-07-25 RX ORDER — SODIUM CHLORIDE, SODIUM LACTATE, POTASSIUM CHLORIDE, CALCIUM CHLORIDE 600; 310; 30; 20 MG/100ML; MG/100ML; MG/100ML; MG/100ML
INJECTION, SOLUTION INTRAVENOUS CONTINUOUS
Status: DISCONTINUED | OUTPATIENT
Start: 2019-07-25 | End: 2019-07-25 | Stop reason: HOSPADM

## 2019-07-25 RX ORDER — SODIUM CHLORIDE, SODIUM LACTATE, POTASSIUM CHLORIDE, CALCIUM CHLORIDE 600; 310; 30; 20 MG/100ML; MG/100ML; MG/100ML; MG/100ML
INJECTION, SOLUTION INTRAVENOUS CONTINUOUS PRN
Status: DISCONTINUED | OUTPATIENT
Start: 2019-07-25 | End: 2019-07-25

## 2019-07-25 RX ORDER — PROPOFOL 10 MG/ML
INJECTION, EMULSION INTRAVENOUS CONTINUOUS PRN
Status: DISCONTINUED | OUTPATIENT
Start: 2019-07-25 | End: 2019-07-25

## 2019-07-25 RX ADMIN — PROPOFOL 150 MCG/KG/MIN: 10 INJECTION, EMULSION INTRAVENOUS at 09:07

## 2019-07-25 RX ADMIN — PROPOFOL 80 MG: 10 INJECTION, EMULSION INTRAVENOUS at 09:07

## 2019-07-25 RX ADMIN — SODIUM CHLORIDE, SODIUM LACTATE, POTASSIUM CHLORIDE, AND CALCIUM CHLORIDE: .6; .31; .03; .02 INJECTION, SOLUTION INTRAVENOUS at 08:07

## 2019-07-25 RX ADMIN — SODIUM CHLORIDE, SODIUM LACTATE, POTASSIUM CHLORIDE, AND CALCIUM CHLORIDE: .6; .31; .03; .02 INJECTION, SOLUTION INTRAVENOUS at 09:07

## 2019-07-25 RX ADMIN — LIDOCAINE HYDROCHLORIDE 60 MG: 20 INJECTION, SOLUTION INTRAVENOUS at 09:07

## 2019-07-25 NOTE — ANESTHESIA POSTPROCEDURE EVALUATION
Anesthesia Post Evaluation    Patient: Sakina CHURCHILL Denis    Procedure(s) Performed: Procedure(s) (LRB):  COLONOSCOPY (N/A)    Final Anesthesia Type: general  Patient location during evaluation: PACU  Patient participation: Yes- Able to Participate  Level of consciousness: awake and alert and oriented  Post-procedure vital signs: reviewed and stable  Pain management: adequate  Airway patency: patent  PONV status at discharge: No PONV  Anesthetic complications: no      Cardiovascular status: blood pressure returned to baseline and hemodynamically stable  Respiratory status: unassisted, spontaneous ventilation and room air  Hydration status: euvolemic  Follow-up not needed.          Vitals Value Taken Time   /60 7/25/2019 10:44 AM   Temp 36.1 °C (96.9 °F) 7/25/2019 10:29 AM   Pulse 66 7/25/2019 10:44 AM   Resp 18 7/25/2019 10:44 AM   SpO2 95 % 7/25/2019 10:44 AM         No case tracking events are documented in the log.      Pain/Celso Score: Celso Score: 10 (7/25/2019 10:33 AM)

## 2019-07-25 NOTE — OP NOTE
Patient Name: Sakina Barrios   Procedure Date: 2019  MRN: [id number]  : 1965  Age: 54 y.o.  Gender: female   Referring Physician :  Donovan Mendez PA-C  Plan for Procedure: Monitored anaesthesia care  Indication: asymptomatic screening exam  Procedure:   Colonoscopy polypectomy cold forceps and Colonoscopy polypectomy hot snare    Surgeon(s) and Role:     * Twan Williamson MD - Primary    Complications: None     Medicines: monitored anesthesia care    Procedure:  Prior to the procedure, a History and Physical was performed, and patient medications, allergies and sensitivities were reviewed.  The patient's tolerance of previous anesthesia was reviewed. The risks and benefits of the procedure and the sedation options and risks were discussed with the patient.  All questions were answered and informed consent was obtained.    After I obtained informed consent, the scope was passed under direct vision.  Throughout the procedure, the patient's blood pressure, pulse, and oxygen saturations were monitored continuously.  The Olympus scope CF - [id number] was introduced through the anus and advanced to the terminal ileum.  The colonoscopy was performed without difficulty.  The patient tolerated the procedure well.  The quality of the bowel preparation was good     Findings:  polyp(s) #1, 15 mm in size, located in the splenic flexure removed by snare cautery, single clip applied for hemostasis, retrieved for pathology  #2, 3 mm in size, located in the sigmoid removed by cold biopsy and sent for pathology    EBL: none    Impression:  Two benign appearing colon polyps, removed as above.  Otherwise normal colonoscopy to the terminal ileum.    Recommendation:  Repeat exam: 3 years  Return to my office prn  High fiber diet

## 2019-07-25 NOTE — H&P
Procedure : Colonoscopy    Indication(s):  asymptomatic screening exam    Review of patient's allergies indicates:  No Known Allergies    Past Medical History:   Diagnosis Date    Carpal tunnel syndrome     both arms    Depression     Fibromyalgia     Hyperlipemia     Hypertension     Mental disorder     depression    Sleep apnea     Subarachnoid hemorrhage        Prior to Admission medications    Medication Sig Start Date End Date Taking? Authorizing Provider   alprazolam (XANAX) 1 MG tablet 3 (three) times daily as needed.  10/6/16  Yes Historical Provider, MD   CRESTOR 20 mg tablet Take 20 mg by mouth every evening.  1/7/15  Yes Historical Provider, MD   DULoxetine (CYMBALTA) 60 MG capsule Take 60 mg by mouth every morning. 8/17/18  Yes Historical Provider, MD   gabapentin (NEURONTIN) 300 MG capsule Take 1 capsule (300 mg total) by mouth 3 (three) times daily. 11/14/18 11/14/19 Yes Ksenia Rosario, NP   omeprazole (PRILOSEC) 40 MG capsule Take 1 capsule by mouth once daily. 10/4/17  Yes Historical Provider, MD   potassium chloride SA (K-DUR,KLOR-CON) 10 MEQ tablet Take 10 mEq by mouth 2 (two) times daily. 6/30/18  Yes Historical Provider, MD   QUEtiapine (SEROQUEL) 200 MG Tab quetiapine 200 mg tablet   take 2 tablet by mouth at bedtime   Yes Historical Provider, MD   tiZANidine (ZANAFLEX) 4 MG tablet Take 8 mg by mouth 3 (three) times daily as needed.   Yes Historical Provider, MD   aspirin 325 MG tablet Take 325 mg by mouth once daily.    Historical Provider, MD   bisacodyl (DULCOLAX) 5 mg EC tablet Take 4 tablets (20 mg total) by mouth once per Dr. Williamson's instruction sheet for 1 dose. 7/24/19 7/24/19  Twan Williamson MD   cholecalciferol, vitamin D3, (VITAMIN D3) 5,000 unit Tab Take 5,000 Units by mouth once daily.    Historical Provider, MD   sodium,potassium,mag sulfates (SUPREP BOWEL PREP KIT) 17.5-3.13-1.6 gram SolR Take 354 milliliters by mouth once per Dr. Williamson's instruction sheet for 1 dose.  7/24/19 7/24/19  Twan Williamson MD   vitamin E 400 UNIT capsule Take 400 Units by mouth once daily.    Historical Provider, MD       Sedation Problems: NO    Family History   Problem Relation Age of Onset    Hypertension Father     Diabetes Father     Coronary artery disease Father     Hypertension Mother     Breast cancer Mother 72    Colon cancer Neg Hx     Ovarian cancer Neg Hx        Fam Hx of Sedation Problems: NO    Social History     Socioeconomic History    Marital status:      Spouse name: Not on file    Number of children: Not on file    Years of education: Not on file    Highest education level: Not on file   Occupational History    Not on file   Social Needs    Financial resource strain: Not on file    Food insecurity:     Worry: Not on file     Inability: Not on file    Transportation needs:     Medical: Not on file     Non-medical: Not on file   Tobacco Use    Smoking status: Current Every Day Smoker     Packs/day: 1.00     Years: 30.00     Pack years: 30.00    Smokeless tobacco: Never Used   Substance and Sexual Activity    Alcohol use: No     Comment: No    Drug use: No    Sexual activity: Yes     Partners: Male     Birth control/protection: Surgical, See Surgical Hx     Comment: .   Lifestyle    Physical activity:     Days per week: Not on file     Minutes per session: Not on file    Stress: Not on file   Relationships    Social connections:     Talks on phone: Not on file     Gets together: Not on file     Attends Religion service: Not on file     Active member of club or organization: Not on file     Attends meetings of clubs or organizations: Not on file     Relationship status: Not on file   Other Topics Concern    Not on file   Social History Narrative    Not on file       Review of Systems -     Respiratory ROS: no cough, shortness of breath, or wheezing  Cardiovascular ROS: no chest pain or dyspnea on exertion  Gastrointestinal ROS: no abdominal pain,  change in bowel habits, or black or bloody stools  Musculoskeletal ROS: negative  Neurological ROS: no TIA or stroke symptoms        Physical Exam:  General: no distress  Head: normocephalic  Airway:  normal oropharynx, airway normal  Neck: supple, symmetrical, trachea midline  Lungs:  normal respiratory effort  Heart: regular rate and rhythm  Abdomen: soft, non-tender non-distented; bowel sounds normal; no masses,  no organomegaly  Extremities: no cyanosis or edema, or clubbing       Deep Sedation: Mallampati Score per anesthesia     SedationPlan :Moderate     ASA : II    Patient is medically cleared for anesthesia.    Anesthesia/Surgery risks, benefits and alternative options discussed and understood by patient/family.

## 2019-07-25 NOTE — DISCHARGE SUMMARY
Discharge Note  Short Stay      SUMMARY     Admit Date: 7/25/2019    Attending Physician: Twan Williamson MD     Discharge Physician: Twan Williamson MD    Discharge Date: 7/25/2019 10:33 AM    Admission Diagnosis: asymptomatic screening exam    Final Diagnosis:  Colon polyps    Procedures Performed:    Colonoscopy polypectomy cold forceps and Colonoscopy polypectomy hot snare    Disposition: Home or Self Care    Condition at Discharge:  Stable    Patient Instructions:   Current Discharge Medication List      CONTINUE these medications which have NOT CHANGED    Details   alprazolam (XANAX) 1 MG tablet 3 (three) times daily as needed.   Refills: 0      CRESTOR 20 mg tablet Take 20 mg by mouth every evening.       DULoxetine (CYMBALTA) 60 MG capsule Take 60 mg by mouth every morning.  Refills: 3      gabapentin (NEURONTIN) 300 MG capsule Take 1 capsule (300 mg total) by mouth 3 (three) times daily.  Qty: 90 capsule, Refills: 11    Associated Diagnoses: Right sided sciatica; Neck pain; Low back pain, unspecified back pain laterality, unspecified chronicity, with sciatica presence unspecified; Fibromyalgia      omeprazole (PRILOSEC) 40 MG capsule Take 1 capsule by mouth once daily.      potassium chloride SA (K-DUR,KLOR-CON) 10 MEQ tablet Take 10 mEq by mouth 2 (two) times daily.  Refills: 3      QUEtiapine (SEROQUEL) 200 MG Tab quetiapine 200 mg tablet   take 2 tablet by mouth at bedtime      tiZANidine (ZANAFLEX) 4 MG tablet Take 8 mg by mouth 3 (three) times daily as needed.      aspirin 325 MG tablet Take 325 mg by mouth once daily.      cholecalciferol, vitamin D3, (VITAMIN D3) 5,000 unit Tab Take 5,000 Units by mouth once daily.      vitamin E 400 UNIT capsule Take 400 Units by mouth once daily.         STOP taking these medications       bisacodyl (DULCOLAX) 5 mg EC tablet Comments:   Reason for Stopping:         sodium,potassium,mag sulfates (SUPREP BOWEL PREP KIT) 17.5-3.13-1.6 gram SolR Comments:   Reason for  Stopping:               Discharge Procedure Orders (must include Diet, Follow-up, Activity)   Discharge Procedure Orders (must include Diet, Follow-up, Activity)   Activity as tolerated        Repeat colonoscopy 3 years.  High fiber diet

## 2019-07-25 NOTE — TRANSFER OF CARE
Anesthesia Transfer of Care Note    Patient: Sakina CHURCHILL Denis    Procedure(s) Performed: Procedure(s) (LRB):  COLONOSCOPY (N/A)    Patient location: PACU    Anesthesia Type: general    Transport from OR: Transported from OR on 6-10 L/min O2 by face mask with adequate spontaneous ventilation    Post pain: adequate analgesia    Post assessment: no apparent anesthetic complications and tolerated procedure well    Post vital signs: stable    Level of consciousness: sedated    Nausea/Vomiting: no nausea/vomiting    Complications: none    Transfer of care protocol was followed      Last vitals:   Visit Vitals  /79 (BP Location: Left arm, Patient Position: Lying)   Pulse 72   Temp 36.2 °C (97.1 °F)   Resp 18   SpO2 98%   Breastfeeding? No

## 2019-07-31 ENCOUNTER — OFFICE VISIT (OUTPATIENT)
Dept: OBSTETRICS AND GYNECOLOGY | Facility: CLINIC | Age: 54
End: 2019-07-31
Payer: COMMERCIAL

## 2019-07-31 VITALS
DIASTOLIC BLOOD PRESSURE: 72 MMHG | SYSTOLIC BLOOD PRESSURE: 92 MMHG | HEART RATE: 78 BPM | BODY MASS INDEX: 30.02 KG/M2 | HEIGHT: 61 IN | RESPIRATION RATE: 18 BRPM | WEIGHT: 159 LBS

## 2019-07-31 DIAGNOSIS — Z80.3 FH: BREAST CANCER IN FIRST DEGREE RELATIVE: ICD-10-CM

## 2019-07-31 DIAGNOSIS — M79.7 FIBROMYALGIA: ICD-10-CM

## 2019-07-31 DIAGNOSIS — Z01.419 WELL WOMAN EXAM WITH ROUTINE GYNECOLOGICAL EXAM: Primary | ICD-10-CM

## 2019-07-31 PROCEDURE — 3074F PR MOST RECENT SYSTOLIC BLOOD PRESSURE < 130 MM HG: ICD-10-PCS | Mod: CPTII,S$GLB,, | Performed by: OBSTETRICS & GYNECOLOGY

## 2019-07-31 PROCEDURE — 3078F PR MOST RECENT DIASTOLIC BLOOD PRESSURE < 80 MM HG: ICD-10-PCS | Mod: CPTII,S$GLB,, | Performed by: OBSTETRICS & GYNECOLOGY

## 2019-07-31 PROCEDURE — 3078F DIAST BP <80 MM HG: CPT | Mod: CPTII,S$GLB,, | Performed by: OBSTETRICS & GYNECOLOGY

## 2019-07-31 PROCEDURE — 99999 PR PBB SHADOW E&M-EST. PATIENT-LVL III: CPT | Mod: PBBFAC,,, | Performed by: OBSTETRICS & GYNECOLOGY

## 2019-07-31 PROCEDURE — 99396 PR PREVENTIVE VISIT,EST,40-64: ICD-10-PCS | Mod: S$GLB,,, | Performed by: OBSTETRICS & GYNECOLOGY

## 2019-07-31 PROCEDURE — 3074F SYST BP LT 130 MM HG: CPT | Mod: CPTII,S$GLB,, | Performed by: OBSTETRICS & GYNECOLOGY

## 2019-07-31 PROCEDURE — 99396 PREV VISIT EST AGE 40-64: CPT | Mod: S$GLB,,, | Performed by: OBSTETRICS & GYNECOLOGY

## 2019-07-31 PROCEDURE — 99999 PR PBB SHADOW E&M-EST. PATIENT-LVL III: ICD-10-PCS | Mod: PBBFAC,,, | Performed by: OBSTETRICS & GYNECOLOGY

## 2019-07-31 RX ORDER — HYDROCHLOROTHIAZIDE 25 MG/1
25 TABLET ORAL DAILY
Refills: 5 | COMMUNITY
Start: 2019-07-24 | End: 2023-11-28

## 2019-07-31 RX ORDER — HYDROXYZINE HYDROCHLORIDE 50 MG/1
50 TABLET, FILM COATED ORAL 4 TIMES DAILY
COMMUNITY
End: 2020-01-22

## 2019-07-31 NOTE — PROGRESS NOTES
Subjective:    Patient ID: Sakina Barrios is a 54 y.o. female.     Chief Complaint: Annual Well Woman Exam     History of Present Illness:  Sakina presents today for Annual Well Woman exam. .No LMP recorded. Patient has had a hysterectomy.. She is currently using no hormone replacement therapy and she reports no problems with hot flashes, night sweats, irritability and vaginal dryness. She reports breast tenderness, denies masses, denies nipple discharge. She denies difficulty with urination. Bowel movements have not significantly changed. She had a colonoscopy recently which revealed 2 benign polyps. She states he mother has had breast cancer and she would like genetic screening.    Menstrual History:   No LMP recorded. Patient has had a hysterectomy.    OB History        4    Para   3    Term   3            AB   1    Living   3       SAB   1    TAB        Ectopic        Multiple        Live Births   3                 Review of Systems   Constitutional: Positive for activity change, appetite change and fatigue. Negative for chills, diaphoresis, fever and unexpected weight change.   HENT: Negative for mouth sores and tinnitus.    Eyes: Negative for discharge and visual disturbance.   Respiratory: Negative for cough, shortness of breath and wheezing.    Cardiovascular: Negative for chest pain, palpitations and leg swelling.   Gastrointestinal: Negative for abdominal pain, blood in stool, constipation, diarrhea, nausea and vomiting.   Endocrine: Negative for diabetes, hair loss, hot flashes, hyperthyroidism and hypothyroidism.   Genitourinary: Negative for decreased libido, dyspareunia, dysuria, flank pain, frequency, genital sores, hematuria, menorrhagia, menstrual problem, pelvic pain, urgency, vaginal bleeding, vaginal discharge, vaginal pain, urinary incontinence, postcoital bleeding and vaginal odor.   Musculoskeletal: Positive for back pain and myalgias. Negative for joint swelling.    Integumentary:  Negative for rash, acne, hair changes and nipple discharge.   Neurological: Positive for numbness and headaches. Negative for seizures and syncope.   Hematological: Negative for adenopathy. Does not bruise/bleed easily.   Psychiatric/Behavioral: Positive for depression and sleep disturbance. The patient is nervous/anxious.    Breast: Negative for mastodynia and nipple discharge        Objective:    Vital Signs:  Vitals:    07/31/19 1119   BP: 92/72   Pulse: 78   Resp: 18       Physical Exam:  General:  alert,normal appearing gravid female   Skin:  Skin color, texture, turgor normal. No rashes or lesions   HEENT:  conjunctivae/corneas clear. PERRL.   Neck: supple, trachea midline, no adenopathy or thyromegally   Respiratory:  clear to auscultation bilaterally   Heart:  regular rate and rhythm, S1, S2 normal, no murmur, click, rub or gallop   Breasts:  Nipples are protruding and have no nipple discharge. No palpable masses, erythema, skin changes, tenderness, or adenopathy. fibrocystic changes, left > right.   Abdomen:  soft, non-tender. Bowel sounds normal. No masses,  no organomegaly   Pelvis: External genitalia: normal general appearance  Urinary system: urethral meatus normal, bladder nontender  Vaginal: atrophic mucosa  Cervix: removed surgically  Uterus: removed surgically  Adnexa: removed surgically   Extremities: Normal ROM; no edema, no cyanosis   Neurologial: Normal strength and tone. No focal numbness or weakness. Reflexes 2+ and equal.   Psychiatric: normal mood, speech, dress, and thought processes         Assessment:      1. Well woman exam with routine gynecological exam    2. Fibromyalgia    3. FH: breast cancer in first degree relative          Plan:      Well woman exam with routine gynecological exam    Fibromyalgia    FH: breast cancer in first degree relative  -     BRCAnalysis w/ myRISK; Future; Expected date: 07/31/2019        COUNSELING:  Sakina was counseled on A.C.O.G. Pap  guidelines and recommendations for yearly pelvic exams in addition to recommendations for yearly mammograms and monthly self breast exams. In addition she was counseled on adequate intake of calcium and vitamin D; to see her PCP for other health maintenance.

## 2019-08-07 ENCOUNTER — HOSPITAL ENCOUNTER (OUTPATIENT)
Dept: RADIOLOGY | Facility: HOSPITAL | Age: 54
Discharge: HOME | End: 2019-08-07
Attending: OBSTETRICS & GYNECOLOGY
Payer: COMMERCIAL

## 2019-08-07 DIAGNOSIS — Z12.31 ENCOUNTER FOR SCREENING MAMMOGRAM FOR BREAST CANCER: ICD-10-CM

## 2019-08-07 PROCEDURE — 77067 SCR MAMMO BI INCL CAD: CPT | Mod: 26,,, | Performed by: RADIOLOGY

## 2019-08-07 PROCEDURE — 77067 SCR MAMMO BI INCL CAD: CPT | Mod: TC

## 2019-08-07 PROCEDURE — 77063 BREAST TOMOSYNTHESIS BI: CPT | Mod: 26,,, | Performed by: RADIOLOGY

## 2019-08-07 PROCEDURE — 77067 MAMMO DIGITAL SCREENING BILAT WITH TOMOSYNTHESIS_CAD: ICD-10-PCS | Mod: 26,,, | Performed by: RADIOLOGY

## 2019-08-07 PROCEDURE — 77063 MAMMO DIGITAL SCREENING BILAT WITH TOMOSYNTHESIS_CAD: ICD-10-PCS | Mod: 26,,, | Performed by: RADIOLOGY

## 2019-12-12 DIAGNOSIS — M54.2 NECK PAIN: ICD-10-CM

## 2019-12-12 DIAGNOSIS — M54.50 CHRONIC BILATERAL LOW BACK PAIN WITHOUT SCIATICA: ICD-10-CM

## 2019-12-12 DIAGNOSIS — M54.31 RIGHT SIDED SCIATICA: ICD-10-CM

## 2019-12-12 DIAGNOSIS — M79.7 FIBROMYALGIA: ICD-10-CM

## 2019-12-12 DIAGNOSIS — G89.29 CHRONIC BILATERAL LOW BACK PAIN WITHOUT SCIATICA: ICD-10-CM

## 2019-12-12 RX ORDER — GABAPENTIN 300 MG/1
300 CAPSULE ORAL 3 TIMES DAILY
Qty: 90 CAPSULE | Refills: 1 | Status: SHIPPED | OUTPATIENT
Start: 2019-12-12 | End: 2020-01-22 | Stop reason: SDUPTHER

## 2020-01-22 ENCOUNTER — OFFICE VISIT (OUTPATIENT)
Dept: NEUROLOGY | Facility: CLINIC | Age: 55
End: 2020-01-22
Payer: COMMERCIAL

## 2020-01-22 ENCOUNTER — PROCEDURE VISIT (OUTPATIENT)
Dept: NEUROLOGY | Facility: CLINIC | Age: 55
End: 2020-01-22
Payer: COMMERCIAL

## 2020-01-22 VITALS
HEIGHT: 61 IN | WEIGHT: 150 LBS | RESPIRATION RATE: 14 BRPM | OXYGEN SATURATION: 99 % | HEART RATE: 95 BPM | DIASTOLIC BLOOD PRESSURE: 70 MMHG | SYSTOLIC BLOOD PRESSURE: 100 MMHG | BODY MASS INDEX: 28.32 KG/M2

## 2020-01-22 DIAGNOSIS — G44.86 CERVICOGENIC HEADACHE: Primary | ICD-10-CM

## 2020-01-22 DIAGNOSIS — M54.31 RIGHT SIDED SCIATICA: ICD-10-CM

## 2020-01-22 DIAGNOSIS — M54.50 LUMBAR PAIN: ICD-10-CM

## 2020-01-22 DIAGNOSIS — M79.7 FIBROMYALGIA: ICD-10-CM

## 2020-01-22 DIAGNOSIS — M54.50 CHRONIC BILATERAL LOW BACK PAIN WITHOUT SCIATICA: ICD-10-CM

## 2020-01-22 DIAGNOSIS — R41.3 MEMORY LOSS: ICD-10-CM

## 2020-01-22 DIAGNOSIS — E78.5 HYPERLIPIDEMIA, UNSPECIFIED HYPERLIPIDEMIA TYPE: ICD-10-CM

## 2020-01-22 DIAGNOSIS — M62.838 CERVICAL PARASPINAL MUSCLE SPASM: ICD-10-CM

## 2020-01-22 DIAGNOSIS — M79.18 CERVICAL MYOFASCIAL PAIN SYNDROME: ICD-10-CM

## 2020-01-22 DIAGNOSIS — F41.9 ANXIETY AND DEPRESSION: ICD-10-CM

## 2020-01-22 DIAGNOSIS — G47.33 OSA (OBSTRUCTIVE SLEEP APNEA): ICD-10-CM

## 2020-01-22 DIAGNOSIS — H53.8 BLURRED VISION: ICD-10-CM

## 2020-01-22 DIAGNOSIS — G89.29 CHRONIC BILATERAL LOW BACK PAIN WITHOUT SCIATICA: ICD-10-CM

## 2020-01-22 DIAGNOSIS — R26.89 FUNCTIONAL GAIT ABNORMALITY: ICD-10-CM

## 2020-01-22 DIAGNOSIS — F32.A ANXIETY AND DEPRESSION: ICD-10-CM

## 2020-01-22 DIAGNOSIS — M54.2 NECK PAIN: ICD-10-CM

## 2020-01-22 DIAGNOSIS — F44.9 CONVERSION DISORDER: ICD-10-CM

## 2020-01-22 DIAGNOSIS — I10 HYPERTENSION, UNSPECIFIED TYPE: ICD-10-CM

## 2020-01-22 DIAGNOSIS — F45.0 SOMATIZATION DISORDER: ICD-10-CM

## 2020-01-22 PROBLEM — Z12.11 COLON CANCER SCREENING: Status: RESOLVED | Noted: 2019-07-25 | Resolved: 2020-01-22

## 2020-01-22 PROCEDURE — 3074F SYST BP LT 130 MM HG: CPT | Mod: CPTII,S$GLB,, | Performed by: NURSE PRACTITIONER

## 2020-01-22 PROCEDURE — 20553 NJX 1/MLT TRIGGER POINTS 3/>: CPT | Mod: S$GLB,,, | Performed by: NURSE PRACTITIONER

## 2020-01-22 PROCEDURE — 3078F PR MOST RECENT DIASTOLIC BLOOD PRESSURE < 80 MM HG: ICD-10-PCS | Mod: CPTII,S$GLB,, | Performed by: NURSE PRACTITIONER

## 2020-01-22 PROCEDURE — 3008F PR BODY MASS INDEX (BMI) DOCUMENTED: ICD-10-PCS | Mod: CPTII,S$GLB,, | Performed by: NURSE PRACTITIONER

## 2020-01-22 PROCEDURE — 3074F PR MOST RECENT SYSTOLIC BLOOD PRESSURE < 130 MM HG: ICD-10-PCS | Mod: CPTII,S$GLB,, | Performed by: NURSE PRACTITIONER

## 2020-01-22 PROCEDURE — 99999 PR PBB SHADOW E&M-EST. PATIENT-LVL V: CPT | Mod: PBBFAC,,, | Performed by: NURSE PRACTITIONER

## 2020-01-22 PROCEDURE — 99999 PR PBB SHADOW E&M-EST. PATIENT-LVL V: ICD-10-PCS | Mod: PBBFAC,,, | Performed by: NURSE PRACTITIONER

## 2020-01-22 PROCEDURE — 99214 PR OFFICE/OUTPT VISIT, EST, LEVL IV, 30-39 MIN: ICD-10-PCS | Mod: 25,S$GLB,, | Performed by: NURSE PRACTITIONER

## 2020-01-22 PROCEDURE — 3078F DIAST BP <80 MM HG: CPT | Mod: CPTII,S$GLB,, | Performed by: NURSE PRACTITIONER

## 2020-01-22 PROCEDURE — 3008F BODY MASS INDEX DOCD: CPT | Mod: CPTII,S$GLB,, | Performed by: NURSE PRACTITIONER

## 2020-01-22 PROCEDURE — 99214 OFFICE O/P EST MOD 30 MIN: CPT | Mod: 25,S$GLB,, | Performed by: NURSE PRACTITIONER

## 2020-01-22 PROCEDURE — 20553 PR INJECT TRIGGER POINTS, > 3: ICD-10-PCS | Mod: S$GLB,,, | Performed by: NURSE PRACTITIONER

## 2020-01-22 RX ORDER — GABAPENTIN 300 MG/1
300 CAPSULE ORAL 3 TIMES DAILY
Qty: 90 CAPSULE | Refills: 1 | Status: SHIPPED | OUTPATIENT
Start: 2020-01-22 | End: 2020-01-22 | Stop reason: SDUPTHER

## 2020-01-22 RX ORDER — GABAPENTIN 300 MG/1
300 CAPSULE ORAL 3 TIMES DAILY
Qty: 90 CAPSULE | Refills: 5 | Status: SHIPPED | OUTPATIENT
Start: 2020-01-22 | End: 2020-03-17

## 2020-01-22 NOTE — PATIENT INSTRUCTIONS
Please consider counseling for anxiety and depression, as this can contribute to neck and jaw tension, which can increase headaches.

## 2020-01-22 NOTE — PROGRESS NOTES
HPI:  Sakina Barrios is a 54 y.o. female female with cervical and lumbar complaints, as well as fibromyalgia, per Rheumatology. Prior complaint of memory loss. Neuropsych testing suggested that her anxiety and depression were contributing to her memory loss.Anxiety prominent with other somatic symptoms like dizziness, visual changes without cause found. She has HTN and HLD, as well as ANTONIO. She is a current every day smoker. CHI from a fall in 1/2019 with a subarachnoid hemorrhage and admit to [facility] with some post-concussive complaints afterwards.     She presents today for a follow up visit. She received cervical TPI's at her last visit in 6/2019 for increased neck pain, which were very effective for her. Tizanidine is helpful for her.     MRI C-spine and MRI L-spine repeated at her last visit, given her increased complaints. Mild degenerative changes only were noted. She was referred to pain management at that time, but did not proceed with this referral, for reasons, which are unclear.     She has complaint of recent headaches and blurred vision. She has a long history of migraines, and they began to occur more frequently in 11/2019. They occur 4x per week. They are located bifrontally/temporally/in a bandlike distribution. They last for a few hours at a time. She takes Ibuprofen and OTC Migraine relief to abort her migraines, which dose relieve her headaches. She takes these agents every time she has a headache, 4x per week. She is not aware of any triggers. She has associated light sensitivity, and she has a great deal of C-spine tension at this time. Laying down in a dark room does help her headaches. She has blurred vision with her headaches, as well as when she does not have a headache. She lost 4 lbs. Since her last visit. She has not had a recent eye exam.     She also continues with L-spine complaints with right sciatica. PT was very helpful for her prior.     She has been grinding  her teeth more and picking at her face, since Psychiatry switched her from Xanax to Ativan, which she does not believe to be helpful.     She is followed by [facility]/Dr. Alexis for her mood issues. She is not in counseling. This was suggested at her last visit.     Memory remains quite challenged at times. No issues driving or cooking; however. No signs of executive dysfunction. She continues with much anxiety and depression.   She completed a repeat MRI Brain since her last visit, which was unremarkable. No SAH seen.     ROS is floridly positive and is not likely reliable  Review of Systems   Unable to perform ROS: Other       I have reviewed all of this patient's past medical and surgical histories as well as family and social histories and active allergies and medications as documented in the electronic medical record.    Exam:  Gen Appearance, well developed/nourished in no apparent distress  CV: 2+ distal pulses with no edema or swelling  Neuro:  MS: Awake, alert, oriented to place, person, time, situation. Sustains attention. Recent recall is intact/remote memory intact, Language is full to spontaneous speech/repetition/naming/comprehension. Fund of Knowledge is full  There is some pyschomotor slowing and she appears anxious at times.   Names 2/2 presidents with anxiety interfering with effort  Draws clock well with mention that she could not do this for PCP  CN: Optic discs are flat with normal vasculature, PERRL, Extraoccular movements and visual fields are full. Normal facial sensation and strength, Hearing symmetric, Tongue and Palate are midline and strong. Shoulder Shrug symmetric and strong.  Motor: Normal bulk, tone, no abnormal movements. 5/5 strength bilateral upper/lower extremities with 2+ reflexes and not clonus  Sensory: symmetric to temp, and vibration,  Romberg negative  Cerebellar: Finger-nose,Heal-shin, Rapid alternating movements intact  Gait: atasia/abasia  MSK survey:  bilateral palpable trigger points to trapezius    Imagin/2019 MRI C-spine:   COMPARISON:  2018    FINDINGS:  The craniocervical junction is intact.  There is no evidence for a Chiari malformation.  The spinal cord is normal in signal without cord edema or myelomalacia.    The incidentally visualized soft tissue structures of the neck appear normal.    Vertebral body alignment and heights are maintained.  Disc spaces are within normal limits.  The marrow signal is normal without evidence for a marrow replacement process, infection or tumor.    At C2-3, no disc herniation, central canal stenosis or neural foraminal narrowing.    At C3-4, no disc herniation, central canal stenosis or neural foraminal narrowing.  Left-sided facet arthropathy.    At C4-5, no disc herniation, central canal stenosis or neural foraminal narrowing.  Left-sided facet arthropathy.    At C5-6, no disc herniation, central canal stenosis or neural foraminal narrowing.  Left-sided facet arthropathy.    At C6-7, no disc herniation, central canal stenosis or neural foraminal narrowing.    At C7-T1, no disc herniation, central canal stenosis or neural foraminal narrowing.      Impression       Mild left-sided facet arthropathy.  No central canal stenosis, neural foraminal narrowing or disc herniation.     2019 MRI L-spine:    COMPARISON:  2018    FINDINGS:  Vertebral body alignment and heights are maintained.  There is mild disc desiccation at the L3-4 level.  The marrow signal is normal without evidence for a marrow replacement process, infection or tumor.  The conus terminates at L1.    The incidentally visualized soft tissue structures of the abdomen appear normal.    At T12-L1, no disc herniation, central canal stenosis or neural foraminal narrowing.    At L1-2, no disc herniation, central canal stenosis or neural foraminal narrowing.    At L2-3, no disc herniation, central canal stenosis or neural foraminal narrowing.    At  L3-4, no disc herniation, central canal stenosis or neural foraminal narrowing.    At L4-5, no disc herniation, central canal stenosis or neural foraminal narrowing.    At L5-S1, no disc herniation, central canal stenosis or neural foraminal narrowing.      Impression       No disc herniation, central canal stenosis or neural foraminal narrowing is identified.  Mild disc desiccation at the L3-4 level.     MRI Brain 5/2019:   MRI of the brain without contrast dated May 2, 2019.    There is no MRI evidence of an acute intracranial abnormality.  No evidence of a focal infarct, hemorrhage, mass or midline shift.    No abnormal signal alteration on diffusion weighted images.  Vascular structures appear normal.    No visualized significant paranasal sinus disease.      Impression       No MRI evidence of an acute intracranial abnormality.     CT Head 1/2/2019:   1. Small amount of residual SAH along the cortical sulci of the left frontal parietal complex in the high convexity. A large portion of the previously identified SAH has resolved.     MRI Brain 10/2017: Atrophy but no acute changes  10/2017 B12/RPR normal    MRI L-spine 3/2018: Minimal desiccation of the L3 disc with a questionable small left paracentral disc bulge at L3-L4.  No significant spinal canal or foraminal encroachment.    3/2018 C-spine and Hip X-rays:   Cervical spine, AP and lateral: Vertebral body alignment, heights and disc spaces are within normal limits.  The prevertebral soft tissues are normal.  No fracture or subluxation is seen    2018 CMP, CBC, B12, LORRIE, RA factor ESR reviewed.  Patient was told to follow up with PCP for mildly low K and rheumatology for elevated ESR  Carotid US 10/2017:  No stenosis    Labs:   2018 Creatinine normal   6/2019 CBC, CMP, TSH, T4, Vitamin D, B12 showed increased WBC count and mild hypokalemia, which she was referred back to her PCP for.     Assessment/Plan: Sakina Barrios is a 54 y.o. female with  memory loss.  Anxiety prominent with other somatic symptoms like dizziness, visual changes without cause found.  Was found to have a cerebral bleed after fall out of a chair this month. Sounds like a traumatic subarachnoid hemorrhage.     I recommend:   1. Repeat PT for cervicogenic headaches, lumbar pain, and neck pain. Dry needling was helpful prior.    -She may continue to work on her functional gait issue at PT as well.   -I suspect this to be conversion disorder related, given her exam again today.   -Repeat C-spine and L-spine MRI in 6/2019 showed mild changes only.   -Cervical TPI's were effective for her prior.   -Tizanidine is helpful for her pain. Rx per PCP.   2. Repeat cervical TPI's today at this time.   3. She could have some cervical and lumbar radicular pain; however, I believe that she does have an overlying fibromyalgia.  - Her pain is historically worse with increased mood issues.   -Very mild findings on bilateral hip X-rays, MRI C-spine, MRI L-spine, and C-spine X-rays were overall normal-nothing to explain her diffuse, ongoing MSK complaints, suggestive of fibromyalgia, diagnosed by Rheumatology prior.  4. She is already taking Gabapentin, as well as Cymbalta (for mood), and Tizanidine without relief.   5. Neuropsych testing suggested anxiety and depression as the cause of her memory loss.   -I again advised that she work with Psychiatry to better manage her mood issues.   -Counseling is needed, as advised prior and again today.   -Prior to CHI-Memory loss resolved with resolution of anxiety and mood issues. Could have incidental atrophy on MRI Brain.   6. She was released from follow up with Neurosurgery in late 1/2019 regarding her SAH.  7. Continue Gabapentin 300 mg tid, as she has greater pain relief with this.    -Do not recommend Gabapentin increase, given her cognitive issues and prior issues with sedation from other medications.   -Prior sedation attributed to concurrent use of Flexeril per patient.  "No longer taking Flexeril.  8. Advised continued compliance with CPAP for her ANTONIO.   9. Continue compound cream for cervical complaints, as this is helpful.   10. Chronic Migraine noted for years, worse lately, but more recent headaches seem to be cervicogenic in nature.   -She needs an update eye exam with Ophthalmology, given her blurred vision, which occurs independent of her headaches.   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 Will avoid adding meds given her complaint of sleepiness prior.   -PT for cervicogenic headache resolved her headaches prior.      RTC 3 months    "note not shared"      "

## 2020-01-22 NOTE — PROCEDURES
Procedures Date: 01/22/2020     Cervical TPI    Trigger points were palpated bilaterally along the Bilateral trapezius, Rhomboids, and Levator Scapulae. Sites were prepped with alcohol, and injected with 1 mL 0.5% Marcaine, using a 5 mL syringe with a 30 gauge ½ inch needle.     Patient tolerated procedure well and blood loss was minimal.

## 2020-02-17 ENCOUNTER — TELEPHONE (OUTPATIENT)
Dept: NEUROLOGY | Facility: CLINIC | Age: 55
End: 2020-02-17

## 2020-02-17 DIAGNOSIS — M79.18 CERVICAL MYOFASCIAL PAIN SYNDROME: Primary | ICD-10-CM

## 2020-02-17 NOTE — TELEPHONE ENCOUNTER
----- Message from Nishi Nagy sent at 2020  9:27 AM CST -----  Contact: PATIENT  Sakina Barrios  MRN: [id number]  : 1965  PCP: Donovan Mendez  Home Phone      [phone]  Work Phone      Not on file.  Mobile          [phone]      MESSAGE: Patient would like to make an appointment for injections.          Phone: [phone]

## 2020-02-19 ENCOUNTER — PROCEDURE VISIT (OUTPATIENT)
Dept: NEUROLOGY | Facility: CLINIC | Age: 55
End: 2020-02-19
Payer: COMMERCIAL

## 2020-02-19 DIAGNOSIS — M54.2 NECK PAIN: Primary | ICD-10-CM

## 2020-02-19 DIAGNOSIS — M79.18 CERVICAL MYOFASCIAL PAIN SYNDROME: ICD-10-CM

## 2020-02-19 PROCEDURE — 20553 NJX 1/MLT TRIGGER POINTS 3/>: CPT | Mod: S$GLB,,, | Performed by: NURSE PRACTITIONER

## 2020-02-19 PROCEDURE — 20553 PR INJECT TRIGGER POINTS, > 3: ICD-10-PCS | Mod: S$GLB,,, | Performed by: NURSE PRACTITIONER

## 2020-02-19 NOTE — PROCEDURES
Procedures Date: 02/19/2020     Cervical TPI    Trigger points were palpated bilaterally along the Bilateral trapezius, Rhomboids, and Levator Scapulae. Sites were prepped with alcohol, and injected with 1 mL 0.5% Marcaine, using a 5 mL syringe with a 30 gauge ½ inch needle.     Patient tolerated procedure well and blood loss was minimal.

## 2020-02-29 ENCOUNTER — PATIENT MESSAGE (OUTPATIENT)
Dept: OBSTETRICS AND GYNECOLOGY | Facility: CLINIC | Age: 55
End: 2020-02-29

## 2020-03-02 NOTE — TELEPHONE ENCOUNTER
Patient was on Premarin 0.9mg in the past, and recently restarted medication. Pt reports is helping and would like new rx sent to pharmacy.

## 2020-03-16 DIAGNOSIS — M79.7 FIBROMYALGIA: ICD-10-CM

## 2020-03-16 DIAGNOSIS — M54.31 RIGHT SIDED SCIATICA: ICD-10-CM

## 2020-03-16 DIAGNOSIS — G89.29 CHRONIC BILATERAL LOW BACK PAIN WITHOUT SCIATICA: ICD-10-CM

## 2020-03-16 DIAGNOSIS — M54.50 CHRONIC BILATERAL LOW BACK PAIN WITHOUT SCIATICA: ICD-10-CM

## 2020-03-16 DIAGNOSIS — M54.2 NECK PAIN: ICD-10-CM

## 2020-03-17 RX ORDER — GABAPENTIN 300 MG/1
CAPSULE ORAL
Qty: 90 CAPSULE | Refills: 1 | Status: SHIPPED | OUTPATIENT
Start: 2020-03-17 | End: 2020-08-13

## 2020-08-13 DIAGNOSIS — M79.7 FIBROMYALGIA: ICD-10-CM

## 2020-08-13 DIAGNOSIS — M54.31 RIGHT SIDED SCIATICA: ICD-10-CM

## 2020-08-13 DIAGNOSIS — M54.50 CHRONIC BILATERAL LOW BACK PAIN WITHOUT SCIATICA: ICD-10-CM

## 2020-08-13 DIAGNOSIS — M54.2 NECK PAIN: ICD-10-CM

## 2020-08-13 DIAGNOSIS — G89.29 CHRONIC BILATERAL LOW BACK PAIN WITHOUT SCIATICA: ICD-10-CM

## 2020-08-13 RX ORDER — GABAPENTIN 300 MG/1
CAPSULE ORAL
Qty: 90 CAPSULE | Refills: 1 | Status: SHIPPED | OUTPATIENT
Start: 2020-08-13 | End: 2020-09-01 | Stop reason: SDUPTHER

## 2020-09-01 ENCOUNTER — OFFICE VISIT (OUTPATIENT)
Dept: NEUROLOGY | Facility: CLINIC | Age: 55
End: 2020-09-01
Payer: COMMERCIAL

## 2020-09-01 VITALS
HEIGHT: 61 IN | WEIGHT: 146.25 LBS | HEART RATE: 84 BPM | DIASTOLIC BLOOD PRESSURE: 82 MMHG | RESPIRATION RATE: 18 BRPM | BODY MASS INDEX: 27.61 KG/M2 | SYSTOLIC BLOOD PRESSURE: 116 MMHG | TEMPERATURE: 98 F

## 2020-09-01 DIAGNOSIS — F45.0 SOMATIZATION DISORDER: ICD-10-CM

## 2020-09-01 DIAGNOSIS — F32.A ANXIETY AND DEPRESSION: ICD-10-CM

## 2020-09-01 DIAGNOSIS — G89.29 CHRONIC BILATERAL LOW BACK PAIN WITHOUT SCIATICA: ICD-10-CM

## 2020-09-01 DIAGNOSIS — M62.838 CERVICAL PARASPINAL MUSCLE SPASM: ICD-10-CM

## 2020-09-01 DIAGNOSIS — R41.3 MEMORY LOSS: ICD-10-CM

## 2020-09-01 DIAGNOSIS — F41.9 ANXIETY AND DEPRESSION: ICD-10-CM

## 2020-09-01 DIAGNOSIS — M54.31 RIGHT SIDED SCIATICA: ICD-10-CM

## 2020-09-01 DIAGNOSIS — G47.33 OSA (OBSTRUCTIVE SLEEP APNEA): ICD-10-CM

## 2020-09-01 DIAGNOSIS — R26.89 FUNCTIONAL GAIT ABNORMALITY: ICD-10-CM

## 2020-09-01 DIAGNOSIS — M54.2 NECK PAIN: Primary | ICD-10-CM

## 2020-09-01 DIAGNOSIS — M54.50 CHRONIC BILATERAL LOW BACK PAIN WITHOUT SCIATICA: ICD-10-CM

## 2020-09-01 DIAGNOSIS — M79.18 CERVICAL MYOFASCIAL PAIN SYNDROME: ICD-10-CM

## 2020-09-01 DIAGNOSIS — M79.7 FIBROMYALGIA: ICD-10-CM

## 2020-09-01 PROCEDURE — 3074F SYST BP LT 130 MM HG: CPT | Mod: CPTII,S$GLB,, | Performed by: NURSE PRACTITIONER

## 2020-09-01 PROCEDURE — 3074F PR MOST RECENT SYSTOLIC BLOOD PRESSURE < 130 MM HG: ICD-10-PCS | Mod: CPTII,S$GLB,, | Performed by: NURSE PRACTITIONER

## 2020-09-01 PROCEDURE — 99999 PR PBB SHADOW E&M-EST. PATIENT-LVL IV: CPT | Mod: PBBFAC,,, | Performed by: NURSE PRACTITIONER

## 2020-09-01 PROCEDURE — 99999 PR PBB SHADOW E&M-EST. PATIENT-LVL IV: ICD-10-PCS | Mod: PBBFAC,,, | Performed by: NURSE PRACTITIONER

## 2020-09-01 PROCEDURE — 3079F DIAST BP 80-89 MM HG: CPT | Mod: CPTII,S$GLB,, | Performed by: NURSE PRACTITIONER

## 2020-09-01 PROCEDURE — 3008F PR BODY MASS INDEX (BMI) DOCUMENTED: ICD-10-PCS | Mod: CPTII,S$GLB,, | Performed by: NURSE PRACTITIONER

## 2020-09-01 PROCEDURE — 99214 PR OFFICE/OUTPT VISIT, EST, LEVL IV, 30-39 MIN: ICD-10-PCS | Mod: S$GLB,,, | Performed by: NURSE PRACTITIONER

## 2020-09-01 PROCEDURE — 99214 OFFICE O/P EST MOD 30 MIN: CPT | Mod: S$GLB,,, | Performed by: NURSE PRACTITIONER

## 2020-09-01 PROCEDURE — 3008F BODY MASS INDEX DOCD: CPT | Mod: CPTII,S$GLB,, | Performed by: NURSE PRACTITIONER

## 2020-09-01 PROCEDURE — 3079F PR MOST RECENT DIASTOLIC BLOOD PRESSURE 80-89 MM HG: ICD-10-PCS | Mod: CPTII,S$GLB,, | Performed by: NURSE PRACTITIONER

## 2020-09-01 RX ORDER — TRAMADOL HYDROCHLORIDE 50 MG/1
TABLET ORAL
COMMUNITY
Start: 2020-07-23 | End: 2021-04-05

## 2020-09-01 RX ORDER — ALPRAZOLAM 1 MG/1
0.5 TABLET ORAL 2 TIMES DAILY
COMMUNITY
End: 2023-11-28

## 2020-09-01 RX ORDER — GABAPENTIN 300 MG/1
CAPSULE ORAL
Qty: 270 CAPSULE | Refills: 1 | Status: SHIPPED | OUTPATIENT
Start: 2020-09-01 | End: 2021-03-02 | Stop reason: SDUPTHER

## 2020-09-01 RX ORDER — ONDANSETRON HYDROCHLORIDE 8 MG/1
8 TABLET, FILM COATED ORAL DAILY PRN
COMMUNITY
Start: 2020-08-20

## 2020-09-01 NOTE — PROGRESS NOTES
HPI:  Sakina Barrios is a 55 y.o. female female with cervical and lumbar complaints, as well as fibromyalgia, per Rheumatology. Prior complaint of memory loss. Neuropsych testing suggested that her anxiety and depression were contributing to her memory loss.Anxiety prominent with other somatic symptoms like dizziness, visual changes without cause found. She has HTN and HLD, as well as ANTONIO. She is a current every day smoker. CHI from a fall in 1/2019 with a subarachnoid hemorrhage and admit to [facility] with some post-concussive complaints afterwards.     She presents today for a follow up visit. She received cervical TPI's in 2/2020 for increased neck pain, which were very effective for her. Tizanidine is helpful for her.     Her musculoskeletal complaints are much improved with weekly massage therapy. She feels that this helps her emotionally as well.     Headaches are greatly improved with resolution of her neck tension with massage.     She did go to PT again after her last visit, which was helpful, but she has received greater relief from massage therapy.     She is followed by [facility]/Dr. Alexis for her mood issues. She is not in counseling.     She continues to grind her teeth with increased stress.     She is CPAP compliant for her ANTONIO.     Memory is improved with improvement of her stress. No issues driving or cooking; however. No signs of executive dysfunction.     ROS is floridly positive and may not be reliable  Review of Systems   Unable to perform ROS: Other       I have reviewed all of this patient's past medical and surgical histories as well as family and social histories and active allergies and medications as documented in the electronic medical record.    Exam:  Gen Appearance, well developed/nourished in no apparent distress  CV: 2+ distal pulses with no edema or swelling  Neuro:  MS: Awake, alert, oriented to place, person, time, situation. Sustains  attention. Recent recall is intact/remote memory intact, Language is full to spontaneous speech/repetition/naming/comprehension. Fund of Knowledge is full.  Names 2/2 presidents with anxiety interfering with effort  Draws clock well with mention that she could not do this for PCP  CN: Optic discs are flat with normal vasculature, PERRL, Extraoccular movements and visual fields are full. Normal facial sensation and strength, Hearing symmetric, Tongue and Palate are midline and strong. Shoulder Shrug symmetric and strong.  Motor: Normal bulk, tone, no abnormal movements. 5/5 strength bilateral upper/lower extremities with 2+ reflexes and not clonus  Sensory: symmetric to temp, and vibration,  Romberg negative  Cerebellar: Finger-nose,Heal-shin, Rapid alternating movements intact  Gait: normal stance; no ataxia    Imagin/2019 MRI C-spine:   COMPARISON:  2018    FINDINGS:  The craniocervical junction is intact.  There is no evidence for a Chiari malformation.  The spinal cord is normal in signal without cord edema or myelomalacia.    The incidentally visualized soft tissue structures of the neck appear normal.    Vertebral body alignment and heights are maintained.  Disc spaces are within normal limits.  The marrow signal is normal without evidence for a marrow replacement process, infection or tumor.    At C2-3, no disc herniation, central canal stenosis or neural foraminal narrowing.    At C3-4, no disc herniation, central canal stenosis or neural foraminal narrowing.  Left-sided facet arthropathy.    At C4-5, no disc herniation, central canal stenosis or neural foraminal narrowing.  Left-sided facet arthropathy.    At C5-6, no disc herniation, central canal stenosis or neural foraminal narrowing.  Left-sided facet arthropathy.    At C6-7, no disc herniation, central canal stenosis or neural foraminal narrowing.    At C7-T1, no disc herniation, central canal stenosis or neural foraminal narrowing.       Impression       Mild left-sided facet arthropathy.  No central canal stenosis, neural foraminal narrowing or disc herniation.     7/2019 MRI L-spine:    COMPARISON:  03/27/2018    FINDINGS:  Vertebral body alignment and heights are maintained.  There is mild disc desiccation at the L3-4 level.  The marrow signal is normal without evidence for a marrow replacement process, infection or tumor.  The conus terminates at L1.    The incidentally visualized soft tissue structures of the abdomen appear normal.    At T12-L1, no disc herniation, central canal stenosis or neural foraminal narrowing.    At L1-2, no disc herniation, central canal stenosis or neural foraminal narrowing.    At L2-3, no disc herniation, central canal stenosis or neural foraminal narrowing.    At L3-4, no disc herniation, central canal stenosis or neural foraminal narrowing.    At L4-5, no disc herniation, central canal stenosis or neural foraminal narrowing.    At L5-S1, no disc herniation, central canal stenosis or neural foraminal narrowing.      Impression       No disc herniation, central canal stenosis or neural foraminal narrowing is identified.  Mild disc desiccation at the L3-4 level.     MRI Brain 5/2019:   MRI of the brain without contrast dated May 2, 2019.    There is no MRI evidence of an acute intracranial abnormality.  No evidence of a focal infarct, hemorrhage, mass or midline shift.    No abnormal signal alteration on diffusion weighted images.  Vascular structures appear normal.    No visualized significant paranasal sinus disease.      Impression       No MRI evidence of an acute intracranial abnormality.     CT Head 1/2/2019:   1. Small amount of residual SAH along the cortical sulci of the left frontal parietal complex in the high convexity. A large portion of the previously identified SAH has resolved.     MRI Brain 10/2017: Atrophy but no acute changes  10/2017 B12/RPR normal    MRI L-spine 3/2018: Minimal desiccation of  the L3 disc with a questionable small left paracentral disc bulge at L3-L4.  No significant spinal canal or foraminal encroachment.    3/2018 C-spine and Hip X-rays:   Cervical spine, AP and lateral: Vertebral body alignment, heights and disc spaces are within normal limits.  The prevertebral soft tissues are normal.  No fracture or subluxation is seen    2018 CMP, CBC, B12, LORRIE, RA factor ESR reviewed.  Patient was told to follow up with PCP for mildly low K and rheumatology for elevated ESR  Carotid US 10/2017:  No stenosis    Labs:   2018 Creatinine normal   6/2019 CBC, CMP, TSH, T4, Vitamin D, B12 showed increased WBC count and mild hypokalemia, which she was referred back to her PCP for.     Assessment/Plan: Sakina Barrios is a 55 y.o. female with  memory loss. Anxiety prominent with other somatic symptoms like dizziness, visual changes without cause found.  Was found to have a cerebral bleed after fall out of a chair this month. Sounds like a traumatic subarachnoid hemorrhage.     I recommend:   1. Chronic pain greatly improved with massage therapy.   -PT partially effective prior, but greater relief with massage therapy for cervicogenic headaches, lumbar pain, and neck pain.   -Dry needling was helpful prior.   -PT helped functional gait complaints prior, resolved currently-suspected to be conversion related.   -Tizanidine is helpful for her pain. Rx per PCP.   2. Cervical TPI's were effective for her prior. Repeat prn.   3. She could have some cervical and lumbar radicular pain; however, I believe that she does have an overlying fibromyalgia.  - Her pain is historically worse with increased mood issues.   -Very mild findings on bilateral hip X-rays, MRI C-spine, MRI L-spine, and C-spine X-rays were overall normal-nothing to explain her diffuse, ongoing MSK complaints, suggestive of fibromyalgia, diagnosed by Rheumatology prior.  4. She takes Gabapentin, as well as Cymbalta (for mood), and Tizanidine.   5.  Neuropsych testing suggested anxiety and depression as the cause of her memory loss.   -Counseling declined prior.   -Prior to CHI-Memory loss resolved with resolution of anxiety and mood issues. Could have incidental atrophy on MRI Brain.   6. She was released from follow up with Neurosurgery in late 1/2019 regarding her SAH.  7. Continue Gabapentin 300 mg tid, as she has greater pain relief with this.    -Do not recommend Gabapentin increase, given her cognitive issues and prior issues with sedation from other medications.   -Prior sedation attributed to concurrent use of Flexeril per patient. No longer taking Flexeril.  8. Advised continued compliance with CPAP for her ANTONIO.   9. Continue compound cream for cervical complaints, as this is helpful. Refilled today.   10. Chronic Migraine noted for years, worse with C-spine tension.   Headaches greatly improved with massage therapy.   -PT for cervicogenic headache resolved her headaches prior.     FU 6 months

## 2021-03-02 ENCOUNTER — OFFICE VISIT (OUTPATIENT)
Dept: NEUROLOGY | Facility: CLINIC | Age: 56
End: 2021-03-02
Payer: COMMERCIAL

## 2021-03-02 ENCOUNTER — PROCEDURE VISIT (OUTPATIENT)
Dept: NEUROLOGY | Facility: CLINIC | Age: 56
End: 2021-03-02
Payer: COMMERCIAL

## 2021-03-02 VITALS
RESPIRATION RATE: 16 BRPM | BODY MASS INDEX: 25.76 KG/M2 | HEART RATE: 72 BPM | SYSTOLIC BLOOD PRESSURE: 116 MMHG | TEMPERATURE: 97 F | DIASTOLIC BLOOD PRESSURE: 78 MMHG | WEIGHT: 140 LBS | HEIGHT: 62 IN

## 2021-03-02 DIAGNOSIS — M54.31 RIGHT SIDED SCIATICA: ICD-10-CM

## 2021-03-02 DIAGNOSIS — M54.2 NECK PAIN: Primary | ICD-10-CM

## 2021-03-02 DIAGNOSIS — F41.9 ANXIETY AND DEPRESSION: ICD-10-CM

## 2021-03-02 DIAGNOSIS — G89.29 CHRONIC BILATERAL LOW BACK PAIN WITHOUT SCIATICA: ICD-10-CM

## 2021-03-02 DIAGNOSIS — R26.89 FUNCTIONAL GAIT ABNORMALITY: ICD-10-CM

## 2021-03-02 DIAGNOSIS — G47.33 OSA (OBSTRUCTIVE SLEEP APNEA): ICD-10-CM

## 2021-03-02 DIAGNOSIS — F32.A ANXIETY AND DEPRESSION: ICD-10-CM

## 2021-03-02 DIAGNOSIS — M62.838 CERVICAL PARASPINAL MUSCLE SPASM: ICD-10-CM

## 2021-03-02 DIAGNOSIS — M25.551 BILATERAL HIP PAIN: ICD-10-CM

## 2021-03-02 DIAGNOSIS — M25.552 BILATERAL HIP PAIN: ICD-10-CM

## 2021-03-02 DIAGNOSIS — F45.0 SOMATIZATION DISORDER: ICD-10-CM

## 2021-03-02 DIAGNOSIS — M54.50 CHRONIC BILATERAL LOW BACK PAIN WITHOUT SCIATICA: ICD-10-CM

## 2021-03-02 DIAGNOSIS — M54.50 LUMBAR PAIN: ICD-10-CM

## 2021-03-02 DIAGNOSIS — R41.3 MEMORY LOSS: ICD-10-CM

## 2021-03-02 DIAGNOSIS — M54.2 NECK PAIN: ICD-10-CM

## 2021-03-02 DIAGNOSIS — M79.18 CERVICAL MYOFASCIAL PAIN SYNDROME: Primary | ICD-10-CM

## 2021-03-02 DIAGNOSIS — F44.9 CONVERSION DISORDER: ICD-10-CM

## 2021-03-02 DIAGNOSIS — M79.7 FIBROMYALGIA: ICD-10-CM

## 2021-03-02 PROCEDURE — 1125F PR PAIN SEVERITY QUANTIFIED, PAIN PRESENT: ICD-10-PCS | Mod: S$GLB,,, | Performed by: NURSE PRACTITIONER

## 2021-03-02 PROCEDURE — 20553 NJX 1/MLT TRIGGER POINTS 3/>: CPT | Mod: S$GLB,,, | Performed by: NURSE PRACTITIONER

## 2021-03-02 PROCEDURE — 99999 PR PBB SHADOW E&M-EST. PATIENT-LVL V: CPT | Mod: PBBFAC,,, | Performed by: NURSE PRACTITIONER

## 2021-03-02 PROCEDURE — 3008F PR BODY MASS INDEX (BMI) DOCUMENTED: ICD-10-PCS | Mod: CPTII,S$GLB,, | Performed by: NURSE PRACTITIONER

## 2021-03-02 PROCEDURE — 3074F PR MOST RECENT SYSTOLIC BLOOD PRESSURE < 130 MM HG: ICD-10-PCS | Mod: CPTII,S$GLB,, | Performed by: NURSE PRACTITIONER

## 2021-03-02 PROCEDURE — 3078F DIAST BP <80 MM HG: CPT | Mod: CPTII,S$GLB,, | Performed by: NURSE PRACTITIONER

## 2021-03-02 PROCEDURE — 3074F SYST BP LT 130 MM HG: CPT | Mod: CPTII,S$GLB,, | Performed by: NURSE PRACTITIONER

## 2021-03-02 PROCEDURE — 20553 PR INJECT TRIGGER POINTS, > 3: ICD-10-PCS | Mod: S$GLB,,, | Performed by: NURSE PRACTITIONER

## 2021-03-02 PROCEDURE — 99214 PR OFFICE/OUTPT VISIT, EST, LEVL IV, 30-39 MIN: ICD-10-PCS | Mod: 25,S$GLB,, | Performed by: NURSE PRACTITIONER

## 2021-03-02 PROCEDURE — 3008F BODY MASS INDEX DOCD: CPT | Mod: CPTII,S$GLB,, | Performed by: NURSE PRACTITIONER

## 2021-03-02 PROCEDURE — 99999 PR PBB SHADOW E&M-EST. PATIENT-LVL V: ICD-10-PCS | Mod: PBBFAC,,, | Performed by: NURSE PRACTITIONER

## 2021-03-02 PROCEDURE — 99214 OFFICE O/P EST MOD 30 MIN: CPT | Mod: 25,S$GLB,, | Performed by: NURSE PRACTITIONER

## 2021-03-02 PROCEDURE — 1125F AMNT PAIN NOTED PAIN PRSNT: CPT | Mod: S$GLB,,, | Performed by: NURSE PRACTITIONER

## 2021-03-02 PROCEDURE — 3078F PR MOST RECENT DIASTOLIC BLOOD PRESSURE < 80 MM HG: ICD-10-PCS | Mod: CPTII,S$GLB,, | Performed by: NURSE PRACTITIONER

## 2021-03-02 RX ORDER — NAPROXEN 500 MG/1
TABLET ORAL
COMMUNITY
End: 2021-04-05

## 2021-03-02 RX ORDER — GABAPENTIN 300 MG/1
CAPSULE ORAL
Qty: 270 CAPSULE | Refills: 1 | Status: SHIPPED | OUTPATIENT
Start: 2021-03-02 | End: 2021-08-10

## 2021-04-05 ENCOUNTER — OFFICE VISIT (OUTPATIENT)
Dept: PAIN MEDICINE | Facility: CLINIC | Age: 56
End: 2021-04-05
Payer: COMMERCIAL

## 2021-04-05 VITALS
HEART RATE: 81 BPM | RESPIRATION RATE: 16 BRPM | DIASTOLIC BLOOD PRESSURE: 78 MMHG | SYSTOLIC BLOOD PRESSURE: 122 MMHG | OXYGEN SATURATION: 98 % | HEIGHT: 62 IN | WEIGHT: 142.5 LBS | BODY MASS INDEX: 26.22 KG/M2

## 2021-04-05 DIAGNOSIS — G89.29 CHRONIC BILATERAL LOW BACK PAIN WITH BILATERAL SCIATICA: ICD-10-CM

## 2021-04-05 DIAGNOSIS — M54.42 CHRONIC BILATERAL LOW BACK PAIN WITH BILATERAL SCIATICA: ICD-10-CM

## 2021-04-05 DIAGNOSIS — M54.50 LUMBAR PAIN: ICD-10-CM

## 2021-04-05 DIAGNOSIS — M54.2 NECK PAIN: ICD-10-CM

## 2021-04-05 DIAGNOSIS — M79.7 FIBROMYALGIA: Primary | ICD-10-CM

## 2021-04-05 DIAGNOSIS — M54.41 CHRONIC BILATERAL LOW BACK PAIN WITH BILATERAL SCIATICA: ICD-10-CM

## 2021-04-05 DIAGNOSIS — M79.18 MYOFASCIAL PAIN: ICD-10-CM

## 2021-04-05 DIAGNOSIS — M53.3 SACROILIAC JOINT DYSFUNCTION: ICD-10-CM

## 2021-04-05 PROCEDURE — 99204 PR OFFICE/OUTPT VISIT, NEW, LEVL IV, 45-59 MIN: ICD-10-PCS | Mod: S$GLB,,, | Performed by: PHYSICAL MEDICINE & REHABILITATION

## 2021-04-05 PROCEDURE — 3008F PR BODY MASS INDEX (BMI) DOCUMENTED: ICD-10-PCS | Mod: CPTII,S$GLB,, | Performed by: PHYSICAL MEDICINE & REHABILITATION

## 2021-04-05 PROCEDURE — 1125F AMNT PAIN NOTED PAIN PRSNT: CPT | Mod: S$GLB,,, | Performed by: PHYSICAL MEDICINE & REHABILITATION

## 2021-04-05 PROCEDURE — 1125F PR PAIN SEVERITY QUANTIFIED, PAIN PRESENT: ICD-10-PCS | Mod: S$GLB,,, | Performed by: PHYSICAL MEDICINE & REHABILITATION

## 2021-04-05 PROCEDURE — 99999 PR PBB SHADOW E&M-EST. PATIENT-LVL V: ICD-10-PCS | Mod: PBBFAC,,, | Performed by: PHYSICAL MEDICINE & REHABILITATION

## 2021-04-05 PROCEDURE — 99999 PR PBB SHADOW E&M-EST. PATIENT-LVL V: CPT | Mod: PBBFAC,,, | Performed by: PHYSICAL MEDICINE & REHABILITATION

## 2021-04-05 PROCEDURE — 3008F BODY MASS INDEX DOCD: CPT | Mod: CPTII,S$GLB,, | Performed by: PHYSICAL MEDICINE & REHABILITATION

## 2021-04-05 PROCEDURE — 99204 OFFICE O/P NEW MOD 45 MIN: CPT | Mod: S$GLB,,, | Performed by: PHYSICAL MEDICINE & REHABILITATION

## 2021-04-12 ENCOUNTER — TELEPHONE (OUTPATIENT)
Dept: OBSTETRICS AND GYNECOLOGY | Facility: CLINIC | Age: 56
End: 2021-04-12

## 2021-04-12 DIAGNOSIS — Z12.31 BREAST CANCER SCREENING BY MAMMOGRAM: Primary | ICD-10-CM

## 2021-07-12 ENCOUNTER — OFFICE VISIT (OUTPATIENT)
Dept: PAIN MEDICINE | Facility: CLINIC | Age: 56
End: 2021-07-12
Payer: COMMERCIAL

## 2021-07-12 VITALS
DIASTOLIC BLOOD PRESSURE: 76 MMHG | HEART RATE: 91 BPM | SYSTOLIC BLOOD PRESSURE: 108 MMHG | HEIGHT: 62 IN | OXYGEN SATURATION: 99 % | BODY MASS INDEX: 25.52 KG/M2 | WEIGHT: 138.69 LBS

## 2021-07-12 DIAGNOSIS — M79.7 FIBROMYALGIA: Primary | ICD-10-CM

## 2021-07-12 DIAGNOSIS — M54.41 CHRONIC BILATERAL LOW BACK PAIN WITH BILATERAL SCIATICA: ICD-10-CM

## 2021-07-12 DIAGNOSIS — M79.18 MYOFASCIAL PAIN: ICD-10-CM

## 2021-07-12 DIAGNOSIS — G89.29 CHRONIC BILATERAL LOW BACK PAIN WITH BILATERAL SCIATICA: ICD-10-CM

## 2021-07-12 DIAGNOSIS — M54.42 CHRONIC BILATERAL LOW BACK PAIN WITH BILATERAL SCIATICA: ICD-10-CM

## 2021-07-12 PROCEDURE — 3008F BODY MASS INDEX DOCD: CPT | Mod: CPTII,S$GLB,, | Performed by: PHYSICAL MEDICINE & REHABILITATION

## 2021-07-12 PROCEDURE — 99999 PR PBB SHADOW E&M-EST. PATIENT-LVL III: CPT | Mod: PBBFAC,,, | Performed by: PHYSICAL MEDICINE & REHABILITATION

## 2021-07-12 PROCEDURE — 99213 PR OFFICE/OUTPT VISIT, EST, LEVL III, 20-29 MIN: ICD-10-PCS | Mod: S$GLB,,, | Performed by: PHYSICAL MEDICINE & REHABILITATION

## 2021-07-12 PROCEDURE — 1125F AMNT PAIN NOTED PAIN PRSNT: CPT | Mod: S$GLB,,, | Performed by: PHYSICAL MEDICINE & REHABILITATION

## 2021-07-12 PROCEDURE — 99213 OFFICE O/P EST LOW 20 MIN: CPT | Mod: S$GLB,,, | Performed by: PHYSICAL MEDICINE & REHABILITATION

## 2021-07-12 PROCEDURE — 1125F PR PAIN SEVERITY QUANTIFIED, PAIN PRESENT: ICD-10-PCS | Mod: S$GLB,,, | Performed by: PHYSICAL MEDICINE & REHABILITATION

## 2021-07-12 PROCEDURE — 99999 PR PBB SHADOW E&M-EST. PATIENT-LVL III: ICD-10-PCS | Mod: PBBFAC,,, | Performed by: PHYSICAL MEDICINE & REHABILITATION

## 2021-07-12 PROCEDURE — 3008F PR BODY MASS INDEX (BMI) DOCUMENTED: ICD-10-PCS | Mod: CPTII,S$GLB,, | Performed by: PHYSICAL MEDICINE & REHABILITATION

## 2021-07-12 RX ORDER — POTASSIUM CITRATE 10 MEQ/1
20 TABLET, EXTENDED RELEASE ORAL 2 TIMES DAILY
COMMUNITY
Start: 2021-05-06 | End: 2022-04-14 | Stop reason: SDUPTHER

## 2021-07-12 RX ORDER — TIZANIDINE 4 MG/1
TABLET ORAL
COMMUNITY
End: 2021-07-12

## 2021-09-20 ENCOUNTER — TELEPHONE (OUTPATIENT)
Dept: NEUROLOGY | Facility: CLINIC | Age: 56
End: 2021-09-20

## 2021-11-01 ENCOUNTER — TELEPHONE (OUTPATIENT)
Dept: PAIN MEDICINE | Facility: CLINIC | Age: 56
End: 2021-11-01

## 2021-11-01 ENCOUNTER — OFFICE VISIT (OUTPATIENT)
Dept: PAIN MEDICINE | Facility: CLINIC | Age: 56
End: 2021-11-01
Payer: COMMERCIAL

## 2021-11-01 VITALS
HEIGHT: 62 IN | DIASTOLIC BLOOD PRESSURE: 76 MMHG | WEIGHT: 143.63 LBS | BODY MASS INDEX: 26.43 KG/M2 | HEART RATE: 72 BPM | RESPIRATION RATE: 16 BRPM | SYSTOLIC BLOOD PRESSURE: 124 MMHG

## 2021-11-01 DIAGNOSIS — M53.3 SACROILIAC JOINT DYSFUNCTION: ICD-10-CM

## 2021-11-01 DIAGNOSIS — M79.7 FIBROMYALGIA: ICD-10-CM

## 2021-11-01 DIAGNOSIS — M54.16 LUMBAR RADICULOPATHY: Primary | ICD-10-CM

## 2021-11-01 DIAGNOSIS — M54.42 CHRONIC BILATERAL LOW BACK PAIN WITH LEFT-SIDED SCIATICA: Primary | ICD-10-CM

## 2021-11-01 DIAGNOSIS — G89.29 CHRONIC BILATERAL LOW BACK PAIN WITH LEFT-SIDED SCIATICA: Primary | ICD-10-CM

## 2021-11-01 DIAGNOSIS — M79.18 MYOFASCIAL PAIN: ICD-10-CM

## 2021-11-01 DIAGNOSIS — M54.16 LUMBAR RADICULOPATHY: ICD-10-CM

## 2021-11-01 PROCEDURE — 99214 PR OFFICE/OUTPT VISIT, EST, LEVL IV, 30-39 MIN: ICD-10-PCS | Mod: S$GLB,,, | Performed by: PHYSICAL MEDICINE & REHABILITATION

## 2021-11-01 PROCEDURE — 3078F PR MOST RECENT DIASTOLIC BLOOD PRESSURE < 80 MM HG: ICD-10-PCS | Mod: CPTII,S$GLB,, | Performed by: PHYSICAL MEDICINE & REHABILITATION

## 2021-11-01 PROCEDURE — 1160F PR REVIEW ALL MEDS BY PRESCRIBER/CLIN PHARMACIST DOCUMENTED: ICD-10-PCS | Mod: CPTII,S$GLB,, | Performed by: PHYSICAL MEDICINE & REHABILITATION

## 2021-11-01 PROCEDURE — 3008F PR BODY MASS INDEX (BMI) DOCUMENTED: ICD-10-PCS | Mod: CPTII,S$GLB,, | Performed by: PHYSICAL MEDICINE & REHABILITATION

## 2021-11-01 PROCEDURE — 3078F DIAST BP <80 MM HG: CPT | Mod: CPTII,S$GLB,, | Performed by: PHYSICAL MEDICINE & REHABILITATION

## 2021-11-01 PROCEDURE — 99214 OFFICE O/P EST MOD 30 MIN: CPT | Mod: S$GLB,,, | Performed by: PHYSICAL MEDICINE & REHABILITATION

## 2021-11-01 PROCEDURE — 3008F BODY MASS INDEX DOCD: CPT | Mod: CPTII,S$GLB,, | Performed by: PHYSICAL MEDICINE & REHABILITATION

## 2021-11-01 PROCEDURE — 99999 PR PBB SHADOW E&M-EST. PATIENT-LVL IV: CPT | Mod: PBBFAC,,, | Performed by: PHYSICAL MEDICINE & REHABILITATION

## 2021-11-01 PROCEDURE — 1159F PR MEDICATION LIST DOCUMENTED IN MEDICAL RECORD: ICD-10-PCS | Mod: CPTII,S$GLB,, | Performed by: PHYSICAL MEDICINE & REHABILITATION

## 2021-11-01 PROCEDURE — 3074F SYST BP LT 130 MM HG: CPT | Mod: CPTII,S$GLB,, | Performed by: PHYSICAL MEDICINE & REHABILITATION

## 2021-11-01 PROCEDURE — 1160F RVW MEDS BY RX/DR IN RCRD: CPT | Mod: CPTII,S$GLB,, | Performed by: PHYSICAL MEDICINE & REHABILITATION

## 2021-11-01 PROCEDURE — 3074F PR MOST RECENT SYSTOLIC BLOOD PRESSURE < 130 MM HG: ICD-10-PCS | Mod: CPTII,S$GLB,, | Performed by: PHYSICAL MEDICINE & REHABILITATION

## 2021-11-01 PROCEDURE — 99999 PR PBB SHADOW E&M-EST. PATIENT-LVL IV: ICD-10-PCS | Mod: PBBFAC,,, | Performed by: PHYSICAL MEDICINE & REHABILITATION

## 2021-11-01 PROCEDURE — 1159F MED LIST DOCD IN RCRD: CPT | Mod: CPTII,S$GLB,, | Performed by: PHYSICAL MEDICINE & REHABILITATION

## 2021-11-01 RX ORDER — PROMETHAZINE HYDROCHLORIDE AND DEXTROMETHORPHAN HYDROBROMIDE 6.25; 15 MG/5ML; MG/5ML
SYRUP ORAL
COMMUNITY
Start: 2021-10-04 | End: 2021-11-01

## 2021-11-01 RX ORDER — ALBUTEROL SULFATE 90 UG/1
AEROSOL, METERED RESPIRATORY (INHALATION)
COMMUNITY

## 2021-11-01 RX ORDER — PROMETHAZINE HYDROCHLORIDE AND DEXTROMETHORPHAN HYDROBROMIDE 6.25; 15 MG/5ML; MG/5ML
SYRUP ORAL
COMMUNITY
End: 2023-05-30

## 2021-11-01 RX ORDER — QUETIAPINE FUMARATE 100 MG/1
TABLET, FILM COATED ORAL
COMMUNITY
End: 2021-11-01

## 2021-11-01 RX ORDER — FLUTICASONE PROPIONATE 50 MCG
SPRAY, SUSPENSION (ML) NASAL
COMMUNITY
End: 2023-05-30

## 2021-11-05 ENCOUNTER — TELEPHONE (OUTPATIENT)
Dept: PAIN MEDICINE | Facility: CLINIC | Age: 56
End: 2021-11-05
Payer: COMMERCIAL

## 2021-11-08 ENCOUNTER — TELEPHONE (OUTPATIENT)
Dept: PAIN MEDICINE | Facility: CLINIC | Age: 56
End: 2021-11-08
Payer: COMMERCIAL

## 2021-11-12 ENCOUNTER — TELEPHONE (OUTPATIENT)
Dept: PAIN MEDICINE | Facility: CLINIC | Age: 56
End: 2021-11-12
Payer: COMMERCIAL

## 2022-04-13 ENCOUNTER — TELEPHONE (OUTPATIENT)
Dept: OBSTETRICS AND GYNECOLOGY | Facility: CLINIC | Age: 57
End: 2022-04-13
Payer: COMMERCIAL

## 2022-04-13 DIAGNOSIS — Z12.31 BREAST CANCER SCREENING BY MAMMOGRAM: Primary | ICD-10-CM

## 2022-04-13 NOTE — TELEPHONE ENCOUNTER
----- Message from Jessy Mendez MA sent at 4/13/2022  2:47 PM CDT -----  Contact: marisabel Barrios  MRN: [id number]  Home Phone      [phone]  Work Phone      Not on file.  Mobile          [phone]    Patient Care Team:  Donovan Mendez PA-C as PCP - General (Family Medicine)  OB? No  What phone number can you be reached at?  [phone]  Message: Please link mammo orders to appt 08/04/22.

## 2022-04-14 ENCOUNTER — OFFICE VISIT (OUTPATIENT)
Dept: NEUROLOGY | Facility: CLINIC | Age: 57
End: 2022-04-14
Payer: COMMERCIAL

## 2022-04-14 VITALS
SYSTOLIC BLOOD PRESSURE: 108 MMHG | RESPIRATION RATE: 14 BRPM | HEIGHT: 62 IN | HEART RATE: 80 BPM | BODY MASS INDEX: 24.38 KG/M2 | WEIGHT: 132.5 LBS | DIASTOLIC BLOOD PRESSURE: 78 MMHG

## 2022-04-14 DIAGNOSIS — F41.9 ANXIETY AND DEPRESSION: ICD-10-CM

## 2022-04-14 DIAGNOSIS — M54.42 CHRONIC BILATERAL LOW BACK PAIN WITH LEFT-SIDED SCIATICA: ICD-10-CM

## 2022-04-14 DIAGNOSIS — M54.50 CHRONIC BILATERAL LOW BACK PAIN WITHOUT SCIATICA: ICD-10-CM

## 2022-04-14 DIAGNOSIS — R26.89 FUNCTIONAL GAIT ABNORMALITY: ICD-10-CM

## 2022-04-14 DIAGNOSIS — F32.A ANXIETY AND DEPRESSION: ICD-10-CM

## 2022-04-14 DIAGNOSIS — M54.31 RIGHT SIDED SCIATICA: ICD-10-CM

## 2022-04-14 DIAGNOSIS — M54.2 NECK PAIN: ICD-10-CM

## 2022-04-14 DIAGNOSIS — G89.29 CHRONIC BILATERAL LOW BACK PAIN WITHOUT SCIATICA: ICD-10-CM

## 2022-04-14 DIAGNOSIS — M62.838 CERVICAL PARASPINAL MUSCLE SPASM: ICD-10-CM

## 2022-04-14 DIAGNOSIS — G47.33 OSA (OBSTRUCTIVE SLEEP APNEA): ICD-10-CM

## 2022-04-14 DIAGNOSIS — G89.29 CHRONIC BILATERAL LOW BACK PAIN WITH LEFT-SIDED SCIATICA: ICD-10-CM

## 2022-04-14 DIAGNOSIS — M79.7 FIBROMYALGIA: Primary | ICD-10-CM

## 2022-04-14 DIAGNOSIS — M25.552 BILATERAL HIP PAIN: ICD-10-CM

## 2022-04-14 DIAGNOSIS — R41.3 MEMORY LOSS: ICD-10-CM

## 2022-04-14 DIAGNOSIS — F45.0 SOMATIZATION DISORDER: ICD-10-CM

## 2022-04-14 DIAGNOSIS — M25.551 BILATERAL HIP PAIN: ICD-10-CM

## 2022-04-14 PROCEDURE — 1159F PR MEDICATION LIST DOCUMENTED IN MEDICAL RECORD: ICD-10-PCS | Mod: CPTII,S$GLB,, | Performed by: NURSE PRACTITIONER

## 2022-04-14 PROCEDURE — 99214 PR OFFICE/OUTPT VISIT, EST, LEVL IV, 30-39 MIN: ICD-10-PCS | Mod: S$GLB,,, | Performed by: NURSE PRACTITIONER

## 2022-04-14 PROCEDURE — 1160F PR REVIEW ALL MEDS BY PRESCRIBER/CLIN PHARMACIST DOCUMENTED: ICD-10-PCS | Mod: CPTII,S$GLB,, | Performed by: NURSE PRACTITIONER

## 2022-04-14 PROCEDURE — 1160F RVW MEDS BY RX/DR IN RCRD: CPT | Mod: CPTII,S$GLB,, | Performed by: NURSE PRACTITIONER

## 2022-04-14 PROCEDURE — 3074F PR MOST RECENT SYSTOLIC BLOOD PRESSURE < 130 MM HG: ICD-10-PCS | Mod: CPTII,S$GLB,, | Performed by: NURSE PRACTITIONER

## 2022-04-14 PROCEDURE — 3008F BODY MASS INDEX DOCD: CPT | Mod: CPTII,S$GLB,, | Performed by: NURSE PRACTITIONER

## 2022-04-14 PROCEDURE — 99999 PR PBB SHADOW E&M-EST. PATIENT-LVL IV: CPT | Mod: PBBFAC,,, | Performed by: NURSE PRACTITIONER

## 2022-04-14 PROCEDURE — 99999 PR PBB SHADOW E&M-EST. PATIENT-LVL IV: ICD-10-PCS | Mod: PBBFAC,,, | Performed by: NURSE PRACTITIONER

## 2022-04-14 PROCEDURE — 3074F SYST BP LT 130 MM HG: CPT | Mod: CPTII,S$GLB,, | Performed by: NURSE PRACTITIONER

## 2022-04-14 PROCEDURE — 3078F DIAST BP <80 MM HG: CPT | Mod: CPTII,S$GLB,, | Performed by: NURSE PRACTITIONER

## 2022-04-14 PROCEDURE — 3078F PR MOST RECENT DIASTOLIC BLOOD PRESSURE < 80 MM HG: ICD-10-PCS | Mod: CPTII,S$GLB,, | Performed by: NURSE PRACTITIONER

## 2022-04-14 PROCEDURE — 3008F PR BODY MASS INDEX (BMI) DOCUMENTED: ICD-10-PCS | Mod: CPTII,S$GLB,, | Performed by: NURSE PRACTITIONER

## 2022-04-14 PROCEDURE — 1159F MED LIST DOCD IN RCRD: CPT | Mod: CPTII,S$GLB,, | Performed by: NURSE PRACTITIONER

## 2022-04-14 PROCEDURE — 99214 OFFICE O/P EST MOD 30 MIN: CPT | Mod: S$GLB,,, | Performed by: NURSE PRACTITIONER

## 2022-04-14 RX ORDER — GABAPENTIN 300 MG/1
300 CAPSULE ORAL 3 TIMES DAILY
Qty: 270 CAPSULE | Refills: 3 | Status: SHIPPED | OUTPATIENT
Start: 2022-04-14 | End: 2023-05-30
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
## 2022-06-09 ENCOUNTER — TELEPHONE (OUTPATIENT)
Dept: OBSTETRICS AND GYNECOLOGY | Facility: CLINIC | Age: 57
End: 2022-06-09

## 2022-06-09 ENCOUNTER — OFFICE VISIT (OUTPATIENT)
Dept: OBSTETRICS AND GYNECOLOGY | Facility: CLINIC | Age: 57
End: 2022-06-09
Payer: COMMERCIAL

## 2022-06-09 VITALS
HEART RATE: 80 BPM | WEIGHT: 137.81 LBS | BODY MASS INDEX: 25.36 KG/M2 | HEIGHT: 62 IN | DIASTOLIC BLOOD PRESSURE: 78 MMHG | SYSTOLIC BLOOD PRESSURE: 106 MMHG

## 2022-06-09 DIAGNOSIS — N63.20 LEFT BREAST MASS: Primary | ICD-10-CM

## 2022-06-09 PROCEDURE — 3008F PR BODY MASS INDEX (BMI) DOCUMENTED: ICD-10-PCS | Mod: CPTII,S$GLB,, | Performed by: OBSTETRICS & GYNECOLOGY

## 2022-06-09 PROCEDURE — 1160F PR REVIEW ALL MEDS BY PRESCRIBER/CLIN PHARMACIST DOCUMENTED: ICD-10-PCS | Mod: CPTII,S$GLB,, | Performed by: OBSTETRICS & GYNECOLOGY

## 2022-06-09 PROCEDURE — 1160F RVW MEDS BY RX/DR IN RCRD: CPT | Mod: CPTII,S$GLB,, | Performed by: OBSTETRICS & GYNECOLOGY

## 2022-06-09 PROCEDURE — 99213 PR OFFICE/OUTPT VISIT, EST, LEVL III, 20-29 MIN: ICD-10-PCS | Mod: S$GLB,,, | Performed by: OBSTETRICS & GYNECOLOGY

## 2022-06-09 PROCEDURE — 1159F MED LIST DOCD IN RCRD: CPT | Mod: CPTII,S$GLB,, | Performed by: OBSTETRICS & GYNECOLOGY

## 2022-06-09 PROCEDURE — 3078F DIAST BP <80 MM HG: CPT | Mod: CPTII,S$GLB,, | Performed by: OBSTETRICS & GYNECOLOGY

## 2022-06-09 PROCEDURE — 3078F PR MOST RECENT DIASTOLIC BLOOD PRESSURE < 80 MM HG: ICD-10-PCS | Mod: CPTII,S$GLB,, | Performed by: OBSTETRICS & GYNECOLOGY

## 2022-06-09 PROCEDURE — 99999 PR PBB SHADOW E&M-EST. PATIENT-LVL V: CPT | Mod: PBBFAC,,, | Performed by: OBSTETRICS & GYNECOLOGY

## 2022-06-09 PROCEDURE — 99213 OFFICE O/P EST LOW 20 MIN: CPT | Mod: S$GLB,,, | Performed by: OBSTETRICS & GYNECOLOGY

## 2022-06-09 PROCEDURE — 1159F PR MEDICATION LIST DOCUMENTED IN MEDICAL RECORD: ICD-10-PCS | Mod: CPTII,S$GLB,, | Performed by: OBSTETRICS & GYNECOLOGY

## 2022-06-09 PROCEDURE — 99999 PR PBB SHADOW E&M-EST. PATIENT-LVL V: ICD-10-PCS | Mod: PBBFAC,,, | Performed by: OBSTETRICS & GYNECOLOGY

## 2022-06-09 PROCEDURE — 3008F BODY MASS INDEX DOCD: CPT | Mod: CPTII,S$GLB,, | Performed by: OBSTETRICS & GYNECOLOGY

## 2022-06-09 PROCEDURE — 3074F SYST BP LT 130 MM HG: CPT | Mod: CPTII,S$GLB,, | Performed by: OBSTETRICS & GYNECOLOGY

## 2022-06-09 PROCEDURE — 3074F PR MOST RECENT SYSTOLIC BLOOD PRESSURE < 130 MM HG: ICD-10-PCS | Mod: CPTII,S$GLB,, | Performed by: OBSTETRICS & GYNECOLOGY

## 2022-06-09 RX ORDER — AMITRIPTYLINE HYDROCHLORIDE 25 MG/1
25 TABLET, FILM COATED ORAL NIGHTLY
COMMUNITY
Start: 2022-06-01 | End: 2023-05-30

## 2022-06-09 RX ORDER — SCOLOPAMINE TRANSDERMAL SYSTEM 1 MG/1
1 PATCH, EXTENDED RELEASE TRANSDERMAL
COMMUNITY

## 2022-06-09 RX ORDER — DULOXETIN HYDROCHLORIDE 30 MG/1
30 CAPSULE, DELAYED RELEASE ORAL DAILY
COMMUNITY
Start: 2022-05-31 | End: 2023-05-30

## 2022-06-09 RX ORDER — CHLORZOXAZONE 500 MG/1
500 TABLET ORAL 3 TIMES DAILY PRN
COMMUNITY
Start: 2022-06-01

## 2022-06-09 RX ORDER — TRAMADOL HYDROCHLORIDE 50 MG/1
50 TABLET ORAL 4 TIMES DAILY PRN
COMMUNITY
Start: 2021-12-16 | End: 2023-05-30

## 2022-06-09 RX ORDER — MELOXICAM 15 MG/1
15 TABLET ORAL DAILY
COMMUNITY
Start: 2022-06-01 | End: 2023-05-30

## 2022-06-09 NOTE — PROGRESS NOTES
Subjective:       Patient ID: Sakina Barrios is a 57 y.o. female.    Chief Complaint:  Mass (Patient found a lump in her left breast and is concerned)      History of Present Illness  Patient presents stating she has noticed a breast mass on left breast.  She noticed it approximately 3 days ago.  She states it is not tender.  It is not changed since its discovery.  Patient's last mammogram was normal but in 2019.     Menstrual History:  OB History        4    Para   3    Term   3            AB   1    Living   3       SAB   1    IAB        Ectopic        Multiple        Live Births   3                Menarche age:  No LMP recorded. Patient has had a hysterectomy.         Review of Systems  Review of Systems   Constitutional: Negative for activity change, appetite change, chills, diaphoresis, fatigue, fever and unexpected weight change.   HENT: Negative for congestion, dental problem, drooling, ear discharge, ear pain, facial swelling, hearing loss, mouth sores, nosebleeds, postnasal drip, rhinorrhea, sinus pressure, sneezing, sore throat, tinnitus, trouble swallowing and voice change.    Eyes: Negative for photophobia, pain, discharge, redness, itching and visual disturbance.   Respiratory: Negative for apnea, cough, choking, chest tightness, shortness of breath, wheezing and stridor.    Cardiovascular: Negative for chest pain, palpitations and leg swelling.   Gastrointestinal: Negative for abdominal distention, abdominal pain, anal bleeding, blood in stool, constipation, diarrhea, nausea, rectal pain and vomiting.   Endocrine: Negative for cold intolerance, heat intolerance, polydipsia, polyphagia and polyuria.   Genitourinary: Negative for decreased urine volume, difficulty urinating, dyspareunia, dysuria, enuresis, flank pain, frequency, genital sores, hematuria, menstrual problem, pelvic pain, urgency, vaginal bleeding, vaginal discharge and vaginal pain.   Musculoskeletal: Negative for  arthralgias, back pain, gait problem, joint swelling, myalgias, neck pain and neck stiffness.   Skin: Negative for color change, pallor, rash and wound.   Allergic/Immunologic: Negative for environmental allergies, food allergies and immunocompromised state.   Neurological: Negative for dizziness, tremors, seizures, syncope, facial asymmetry, speech difficulty, weakness, light-headedness, numbness and headaches.   Hematological: Negative for adenopathy. Does not bruise/bleed easily.   Psychiatric/Behavioral: Negative for agitation, behavioral problems, confusion, decreased concentration, dysphoric mood, hallucinations, self-injury, sleep disturbance and suicidal ideas. The patient is not nervous/anxious and is not hyperactive.            Objective:      Physical Exam  Vitals and nursing note reviewed.   Chest:                 Assessment:        1. Left breast mass                Plan:         Sakina was seen today for mass.    Diagnoses and all orders for this visit:    Left breast mass  -     Mammo Digital Diagnostic Bilat with Sonny; Future

## 2022-06-09 NOTE — TELEPHONE ENCOUNTER
Good afternoon,  Dr. Perez has placed orders in the system for a diagnostic mammogram, can you please contact the pt to schedule. Thank you.

## 2022-06-16 ENCOUNTER — HOSPITAL ENCOUNTER (OUTPATIENT)
Dept: RADIOLOGY | Facility: HOSPITAL | Age: 57
Discharge: HOME | End: 2022-06-16
Attending: OBSTETRICS & GYNECOLOGY
Payer: COMMERCIAL

## 2022-06-16 DIAGNOSIS — N63.20 LEFT BREAST MASS: ICD-10-CM

## 2022-06-16 PROCEDURE — 77062 BREAST TOMOSYNTHESIS BI: CPT | Mod: 26,,, | Performed by: RADIOLOGY

## 2022-06-16 PROCEDURE — 76642 US BREAST LEFT LIMITED: ICD-10-PCS | Mod: 26,LT,, | Performed by: RADIOLOGY

## 2022-06-16 PROCEDURE — 77066 DX MAMMO INCL CAD BI: CPT | Mod: TC

## 2022-06-16 PROCEDURE — 77066 DX MAMMO INCL CAD BI: CPT | Mod: 26,,, | Performed by: RADIOLOGY

## 2022-06-16 PROCEDURE — 77062 MAMMO DIGITAL DIAGNOSTIC BILAT WITH TOMO: ICD-10-PCS | Mod: 26,,, | Performed by: RADIOLOGY

## 2022-06-16 PROCEDURE — 76642 ULTRASOUND BREAST LIMITED: CPT | Mod: 26,LT,, | Performed by: RADIOLOGY

## 2022-06-16 PROCEDURE — 77066 MAMMO DIGITAL DIAGNOSTIC BILAT WITH TOMO: ICD-10-PCS | Mod: 26,,, | Performed by: RADIOLOGY

## 2022-08-04 ENCOUNTER — OFFICE VISIT (OUTPATIENT)
Dept: OBSTETRICS AND GYNECOLOGY | Facility: CLINIC | Age: 57
End: 2022-08-04
Payer: COMMERCIAL

## 2022-08-04 VITALS
RESPIRATION RATE: 16 BRPM | BODY MASS INDEX: 24.84 KG/M2 | SYSTOLIC BLOOD PRESSURE: 114 MMHG | WEIGHT: 135 LBS | HEART RATE: 83 BPM | DIASTOLIC BLOOD PRESSURE: 72 MMHG | HEIGHT: 62 IN

## 2022-08-04 DIAGNOSIS — Z90.710 HISTORY OF HYSTERECTOMY: ICD-10-CM

## 2022-08-04 DIAGNOSIS — Z12.11 SCREEN FOR COLON CANCER: ICD-10-CM

## 2022-08-04 DIAGNOSIS — Z86.010 HISTORY OF COLON POLYPS: ICD-10-CM

## 2022-08-04 DIAGNOSIS — Z01.419 WELL WOMAN EXAM WITH ROUTINE GYNECOLOGICAL EXAM: Primary | ICD-10-CM

## 2022-08-04 PROCEDURE — 1159F MED LIST DOCD IN RCRD: CPT | Mod: CPTII,S$GLB,, | Performed by: OBSTETRICS & GYNECOLOGY

## 2022-08-04 PROCEDURE — 99396 PREV VISIT EST AGE 40-64: CPT | Mod: S$GLB,,, | Performed by: OBSTETRICS & GYNECOLOGY

## 2022-08-04 PROCEDURE — 1160F PR REVIEW ALL MEDS BY PRESCRIBER/CLIN PHARMACIST DOCUMENTED: ICD-10-PCS | Mod: CPTII,S$GLB,, | Performed by: OBSTETRICS & GYNECOLOGY

## 2022-08-04 PROCEDURE — 99999 PR PBB SHADOW E&M-EST. PATIENT-LVL V: CPT | Mod: PBBFAC,,, | Performed by: OBSTETRICS & GYNECOLOGY

## 2022-08-04 PROCEDURE — 99999 PR PBB SHADOW E&M-EST. PATIENT-LVL V: ICD-10-PCS | Mod: PBBFAC,,, | Performed by: OBSTETRICS & GYNECOLOGY

## 2022-08-04 PROCEDURE — 3074F SYST BP LT 130 MM HG: CPT | Mod: CPTII,S$GLB,, | Performed by: OBSTETRICS & GYNECOLOGY

## 2022-08-04 PROCEDURE — 3074F PR MOST RECENT SYSTOLIC BLOOD PRESSURE < 130 MM HG: ICD-10-PCS | Mod: CPTII,S$GLB,, | Performed by: OBSTETRICS & GYNECOLOGY

## 2022-08-04 PROCEDURE — 3008F PR BODY MASS INDEX (BMI) DOCUMENTED: ICD-10-PCS | Mod: CPTII,S$GLB,, | Performed by: OBSTETRICS & GYNECOLOGY

## 2022-08-04 PROCEDURE — 1159F PR MEDICATION LIST DOCUMENTED IN MEDICAL RECORD: ICD-10-PCS | Mod: CPTII,S$GLB,, | Performed by: OBSTETRICS & GYNECOLOGY

## 2022-08-04 PROCEDURE — 1160F RVW MEDS BY RX/DR IN RCRD: CPT | Mod: CPTII,S$GLB,, | Performed by: OBSTETRICS & GYNECOLOGY

## 2022-08-04 PROCEDURE — 3008F BODY MASS INDEX DOCD: CPT | Mod: CPTII,S$GLB,, | Performed by: OBSTETRICS & GYNECOLOGY

## 2022-08-04 PROCEDURE — 99396 PR PREVENTIVE VISIT,EST,40-64: ICD-10-PCS | Mod: S$GLB,,, | Performed by: OBSTETRICS & GYNECOLOGY

## 2022-08-04 PROCEDURE — 3078F PR MOST RECENT DIASTOLIC BLOOD PRESSURE < 80 MM HG: ICD-10-PCS | Mod: CPTII,S$GLB,, | Performed by: OBSTETRICS & GYNECOLOGY

## 2022-08-04 PROCEDURE — 3078F DIAST BP <80 MM HG: CPT | Mod: CPTII,S$GLB,, | Performed by: OBSTETRICS & GYNECOLOGY

## 2022-08-04 NOTE — PROGRESS NOTES
Subjective:    Patient ID: Sakina Barrios is a 57 y.o. y.o. female.     Chief Complaint: Annual Well Woman Exam     History of Present Illness:  Sakina presents today for Annual Well Woman exam. She describes her menses as absent, h/o hysterectomy and BSO.She denies pelvic pain.  She reports occ breast tenderness, but denies  masses, nipple discharge. She denies GYN complaints. She denies difficulty with urination or bowel movements. She denies menopausal symptoms such as hotflashes, vaginal dryness, and night sweats. She denies bloating, early satiety, or weight changes. She is sexually active.     Menstrual History:   No LMP recorded. Patient has had a hysterectomy..     OB History    : 4  Para: 3  Term: 3  : 1  Living: 3  Spontaneous : 1  Live Births: 3            The following portions of the patient's history were reviewed and updated as appropriate: allergies, current medications, past family history, past medical history, past social history, past surgical history and problem list.    ROS:   CONSTITUTIONAL: Negative for fever, chills, diaphoresis, weakness, fatigue, weight loss, weight gain  ENT: negative for sore throat, nasal congestion, nasal discharge, epistaxis, tinnitus, hearing loss  EYES: negative for blurry vision, decreased vision, loss of vision, eye pain, diplopia, photophobia, discharge  SKIN: Negative for rash, itching, hives  RESPIRATORY: negative for cough, hemoptysis, shortness of breath, pleuritic chest pain, wheezing  CARDIOVASCULAR: negative for chest pain, dyspnea on exertion, orthopnea, paroxysmal nocturnal dyspnea, edema, palpitations  BREAST: breast  Tenderness, negative for  breast mass, nipple discharge, or skin changes  GASTROINTESTINAL: negative for abdominal pain, flank pain, nausea, vomiting, diarrhea, constipation, black stool, blood in stool  GENITOURINARY: negative for abnormal vaginal bleeding, amenorrhea, decreased libido, dysuria, genital sores,  hematuria, incontinence, menorrhagia, pelvic pain, urinary frequency, vaginal discharge  HEMATOLOGIC/LYMPHATIC: negative for swollen lymph nodes, bleeding, bruising  MUSCULOSKELETAL: back pain, joint pain, muscle pain, muscle weakness  NEUROLOGICAL: negative for dizzy/vertigo, headache, focal weakness, numbness/tingling, speech problems, loss of consciousness, confusion, memory loss  BEHAVORIAL/PSYCH: No psychosis and Positive for anxiety and depression  ENDOCRINE: negative for polydipsia/polyuria, palpitations, skin changes, temperature intolerance, unexpected weight changes  ALLERGIC/IMMUNOLOGIC: negative for urticaria, hay fever, angioedema      Objective:    Vital Signs:  Vitals:    08/04/22 1217   BP: 114/72   Pulse: 83   Resp: 16       Physical Exam:  General:  alert, cooperative, no distress   Skin:  Skin color, texture, turgor normal. No rashes or lesions   HEENT:  extra ocular movement intact, sclera clear, anicteric   Neck: supple, trachea midline, no adenopathy or thyromegally   Respiratory:  Normal effort   Breasts:  no discharge, erythema, tenderness, or palpable masses; no axillary lymphadenopathy   Abdomen:  soft, nontender, no palpable masses   Pelvis: External genitalia: normal general appearance  Urinary system: urethral meatus normal, bladder nontender  Vaginal: normal without tenderness, induration or masses, atrophic mucosa  Cervix: removed surgically  Uterus: removed surgically  Adnexa: removed surgically   Extremities: Normal ROM; no edema, no cyanosis   Neurologial: Normal strength and tone. No focal numbness or weakness.   Psychiatric: normal mood, speech, dress, and thought processes         Assessment:       Healthy female exam.     1. Well woman exam with routine gynecological exam    2. Screen for colon cancer    3. History of colon polyps    4. History of hysterectomy          Plan:      Well woman exam with routine gynecological exam    Screen for colon cancer  -     Case Request  Endoscopy: COLONOSCOPY    History of colon polyps  -     Case Request Endoscopy: COLONOSCOPY    History of hysterectomy          COUNSELING:  Sakina was counseled on A.C.O.G. Pap guidelines and recommendations for yearly pelvic exams in addition to recommendations for monthly self breast exams; to see her PCP for other health maintenance.

## 2022-08-17 ENCOUNTER — OFFICE VISIT (OUTPATIENT)
Dept: SLEEP MEDICINE | Facility: CLINIC | Age: 57
End: 2022-08-17
Payer: COMMERCIAL

## 2022-08-17 VITALS
DIASTOLIC BLOOD PRESSURE: 70 MMHG | SYSTOLIC BLOOD PRESSURE: 119 MMHG | WEIGHT: 135 LBS | HEIGHT: 62 IN | HEART RATE: 89 BPM | BODY MASS INDEX: 24.84 KG/M2

## 2022-08-17 DIAGNOSIS — G47.33 OSA (OBSTRUCTIVE SLEEP APNEA): Primary | ICD-10-CM

## 2022-08-17 PROCEDURE — 99999 PR PBB SHADOW E&M-EST. PATIENT-LVL V: ICD-10-PCS | Mod: PBBFAC,,, | Performed by: PSYCHIATRY & NEUROLOGY

## 2022-08-17 PROCEDURE — 1159F PR MEDICATION LIST DOCUMENTED IN MEDICAL RECORD: ICD-10-PCS | Mod: CPTII,S$GLB,, | Performed by: PSYCHIATRY & NEUROLOGY

## 2022-08-17 PROCEDURE — 1160F RVW MEDS BY RX/DR IN RCRD: CPT | Mod: CPTII,S$GLB,, | Performed by: PSYCHIATRY & NEUROLOGY

## 2022-08-17 PROCEDURE — 3008F PR BODY MASS INDEX (BMI) DOCUMENTED: ICD-10-PCS | Mod: CPTII,S$GLB,, | Performed by: PSYCHIATRY & NEUROLOGY

## 2022-08-17 PROCEDURE — 99204 PR OFFICE/OUTPT VISIT, NEW, LEVL IV, 45-59 MIN: ICD-10-PCS | Mod: S$GLB,,, | Performed by: PSYCHIATRY & NEUROLOGY

## 2022-08-17 PROCEDURE — 3074F PR MOST RECENT SYSTOLIC BLOOD PRESSURE < 130 MM HG: ICD-10-PCS | Mod: CPTII,S$GLB,, | Performed by: PSYCHIATRY & NEUROLOGY

## 2022-08-17 PROCEDURE — 99999 PR PBB SHADOW E&M-EST. PATIENT-LVL V: CPT | Mod: PBBFAC,,, | Performed by: PSYCHIATRY & NEUROLOGY

## 2022-08-17 PROCEDURE — 99204 OFFICE O/P NEW MOD 45 MIN: CPT | Mod: S$GLB,,, | Performed by: PSYCHIATRY & NEUROLOGY

## 2022-08-17 PROCEDURE — 1160F PR REVIEW ALL MEDS BY PRESCRIBER/CLIN PHARMACIST DOCUMENTED: ICD-10-PCS | Mod: CPTII,S$GLB,, | Performed by: PSYCHIATRY & NEUROLOGY

## 2022-08-17 PROCEDURE — 3078F PR MOST RECENT DIASTOLIC BLOOD PRESSURE < 80 MM HG: ICD-10-PCS | Mod: CPTII,S$GLB,, | Performed by: PSYCHIATRY & NEUROLOGY

## 2022-08-17 PROCEDURE — 3078F DIAST BP <80 MM HG: CPT | Mod: CPTII,S$GLB,, | Performed by: PSYCHIATRY & NEUROLOGY

## 2022-08-17 PROCEDURE — 3008F BODY MASS INDEX DOCD: CPT | Mod: CPTII,S$GLB,, | Performed by: PSYCHIATRY & NEUROLOGY

## 2022-08-17 PROCEDURE — 3074F SYST BP LT 130 MM HG: CPT | Mod: CPTII,S$GLB,, | Performed by: PSYCHIATRY & NEUROLOGY

## 2022-08-17 PROCEDURE — 1159F MED LIST DOCD IN RCRD: CPT | Mod: CPTII,S$GLB,, | Performed by: PSYCHIATRY & NEUROLOGY

## 2022-08-17 NOTE — PROGRESS NOTES
"    EPWORTH SLEEPINESS SCALE TOTAL SCORE  8/17/2022 5/22/2019 1/22/2019 11/12/2018 6/27/2018   Total score 5 3 2 5 6       Sakina Barrios is a 57 y.o. female seen today for CPAP  follow up. Last seen By SAAD Robles NP over 3 years ago, prior to that by EMELIA Borjas MD.  This is my first visit with her.     The patient reports improved sleep continuity and daytime sleepiness on PAP. ESS today is 5/24.  Denies break through snoring.  No dry mouth.   no aerophagia or air hunger. no significant mask leaks and discomfort.    Stopped using since recall - she believes she has registered the machine for recall, however recall registration website is still allowing me to register her machine.     Respironics positive airway pressure devices recall was discussed with the patient. Sakina Barrios states that she has not experienced episodes of asthma-like symptoms, headache, nausea or dizziness associated with his Respironics device use. she has   not used Soclean or another ozone clener. she has registered her device with [employer].     She has a hard time sleeping wibhtout her machine.    Elavil (given for muscle pain) helped her sleep but would not take on a regular basis.    APAP 6 to 20        4:25  hrs : mins  Avg Usage (Days Used)  21.2  l/min  Avg total leak  3.6  l/min  Avg unintentional leak  19.0  l/min  Median total leak  0.0  l/min  Median unintentional leak  1.3  Avg AHI  0.2  Avg CA Index  0.6  Avg OA index  8.1  cmH?O    07/06/2018  lb. The overall AHI was 4 and overall RDI was 10. The oxygen dante was 82.5% and % time < 90% SpO2 was 2.8%.  10/29/2018  lb. The overall AHI was 12.8 with an oxygen dante of 84.0%. The supine AHI was 15.9 and the REM AHI was 4.7.   PHYSICAL EXAM:  /70   Pulse 89   Ht 5' 2" (1.575 m)   Wt 61.2 kg (135 lb)   BMI 24.69 kg/m²   GENERAL: Overweight development, well groomed                                               ASSESSMENT:    Obstructive sleep apnea, " mild by AHI. The patient symptomatically has snoring, daytime fatigue, interrupted sleep, daytime sleepiness, and memory impairment improving with CPAP. The patient is adherent on CPAP and experiencing symptomatic benefit. Medical co-morbidities: systemic HTN, fibromyalgia, anxiety, GERD, and obesity.  This warrants treatment for obstructive sleep apnea.  Pt had a fall which lead to hospitalization that affected CPAP use x 1 week because she did not use her own unit while admitted.     Insomnia, chronic, managed by psychiatrist     PLAN:    I have registered her machine with [employer] for her.  For now, can take Melatonin for sleep continuity vs Zquill.   Confirmation number is in the [facility]SD    Treatment:  -continue APAP. Increase mask-on time. RTC 12 months or earlier as needed    Education: During our discussion today, we talked about the etiology of obstructive sleep apnea as well as the potential ramifications of untreated sleep apnea, which could include daytime sleepiness, hypertension, heart disease and/or stroke. We discussed potential treatment options, which could include weight loss, body positioning, continuous positive airway pressure (CPAP), OA, EPAP, or referral for surgical consideration.     Precautions: The patient was advised to abstain from driving should they feel sleepy  or drowsy.

## 2022-08-17 NOTE — PATIENT INSTRUCTIONS
Can take Melatonin 5-10 mg vs Zquill over the counter till you get your machine replaced.     Thank you for registering. For future reference, please save the following information so that we may be able to service you more effectively:  Your confirmation code is: [id number]  S/N of your machine is [id number]  What to expect next:  We regret that it may take some time to replace affected devices.  The planned repair for the affected devices involves certain design changes, which in some markets may include review and/or authorization by the relevant regulatory agencies.  We understand the impact of this issue and we sincerely regret this disruption.  For additional questions:  For more information and updates, please visit [email or web address] where we will be updating answers to frequently asked questions (FAQ) as more information becomes available or call [phone] in the [place] and [facility] or [phone] outside the [place].   Thank you.

## 2022-10-17 RX ORDER — ASPIRIN 81 MG/1
81 TABLET ORAL DAILY
COMMUNITY

## 2022-10-28 RX ORDER — SODIUM, POTASSIUM,MAG SULFATES 17.5-3.13G
SOLUTION, RECONSTITUTED, ORAL ORAL ONCE
Qty: 354 ML | Refills: 0 | Status: SHIPPED | OUTPATIENT
Start: 2022-10-28 | End: 2022-10-28

## 2022-11-01 ENCOUNTER — HOSPITAL ENCOUNTER (OUTPATIENT)
Dept: PREADMISSION TESTING | Facility: HOSPITAL | Age: 57
Discharge: HOME | End: 2022-11-01
Attending: COLON & RECTAL SURGERY
Payer: COMMERCIAL

## 2022-11-01 DIAGNOSIS — Z86.010 HISTORY OF COLON POLYPS: Primary | ICD-10-CM

## 2022-11-01 RX ORDER — ATENOLOL 25 MG/1
25 TABLET ORAL DAILY
COMMUNITY

## 2022-11-01 NOTE — DISCHARGE INSTRUCTIONS
Colonoscopy Outpatient Procedure      Prep/Procedure Review:     Follow instructions as written on Dr. Williamson's Colonoscopy Prep Sheet     Dr. Williamson patients have nothing to eat or drink after morning prep is complete.     Take medications as instructed by your doctor and pre-admit nurse.     Wear something comfortable that is easy for you to take off and put on.      Someone must come with you to drive you home. YOU CANNOT DRIVE FOR 24 HOURS AFTER YOUR PROCEDURE.      Do not wear any makeup, jewelry, or body piercings. Leave valuables at home or let your family member keep them for you. Do not bring them to the Surgery area.        Date/Day of Procedure:      Arrival time: Someone will call you the workday day before the procedure to give you an arrival time.      If asked to report to the hospital before 7:00 am report to the Emergency Room.     If asked to report to the hospital at 7:00 a.m. or later report to Patient Registration     It is not necessary to report earlier than the time you are told.      Plan to be at the hospital for about 4 hours, however, it could be longer.        Diabetics:     If you are diabetic do not take your diabetes medication the morning of the procedure unless otherwise instructed by your doctor or pre-admit nurse.     It is important to monitor your blood sugar levels the day you are doing your prep to make sure your blood sugar levels are maintained.     When you begin the clear liquid diet you may drink liquids with sugar as a source of glucose if needed.       Home Medications:      You may take your morning medications the day of your procedure.      The pre-admit nurse will let you know which medications you will need to skip prior to your procedure.      If you are on anticoagulants/ blood thinning medications, you will need to be off these for a specific number of days before your procedure. The pre-admit nurse will give you instructions on when to stop these.           Colonoscopy Prep Instructions      Date: Thursday   Arrival time:       IMPORTANT: PLEASE READ YOUR INSTRUCTIONS CAREFULLY. FAILURE TO FOLLOW THESE INSTRUCTIONS MAY RESULT IN YOUR PROCEURE BEING             CANCELED,RESCHEDULED, OR REPEATED.            Clear Liquid Diet     The Wednesday before your procedure you will follow a clear liquid diet ALL day.     You may have any of the following:     Water, tea, coffee (decaffeinated or regular)     Soft drinks (regular or sugar free)     Gelatin dessert (plain or flavored)     Juice, Gatorade, Powerade, Crystal Lite, lemonade, limeade, Alen-Aid     Bouillon, clear consommé, 100% fat free beef, chicken, or vegetable broths     Snowballs, popsicles     100% cranberry juice is the only red liquid allowed     Please Avoid the following:     Anything with RED dye     Liquids not specifically listed     Dairy (liquid and powder)     Creamers (liquid and powder)     Alcohol            Suprep Instructions:   Please disregard the Suprep insert/box instructions from pharmacy.         The day before your procedure: Wednesday     Upon awakening begin the clear liquid diet. DO NOT EAT SOLID FOODS     Drink at least 6-8 glasses of clear liquids until you begin your prep     You may mix your prep Wednesday morning     At 12:00 pm (noon): Take four (4) Dulcolax tablets (Bisacodyl) with 8 ounces of clear liquids.     At 6:00 pm: Pour one 6-ounce bottle of Suprep into the mixing container                          Add water to the 16-ounce line on the container and mix                Drink all the mixed prep plus 2 more 16-ounce containers of clear liquid within 1 hour     It is common to experience abdominal cramping, nausea, and vomiting when taking the prep. If you have nausea and/or vomiting while taking the prep, stop drinking for 20-30 minutes then resume.      You may continue with the clear liquids after this step.     The day of your procedure: Thursday              Pour one  6-ounce bottle of Suprep into the mixing container             Add water to the 16-ounce line on the container and mix             Drink all the mixed prep plus 2 more 16-ounce containers of clear liquid within 1 hour     You must finish your morning prep 4 hours before you are to arrive at the hospital.     Your stools should be clear to clear yellow by this time.     Once you have completed the Thursday morning portion of your prep you may take your medicines as directed with a small amount of water.      NOTHING ELSE TO DRINK AFTER YOUR THURSDAY MORNING PREP IS COMPLETE

## 2022-11-10 ENCOUNTER — ANESTHESIA (OUTPATIENT)
Dept: ENDOSCOPY | Facility: HOSPITAL | Age: 57
End: 2022-11-10
Payer: COMMERCIAL

## 2022-11-10 ENCOUNTER — HOSPITAL ENCOUNTER (OUTPATIENT)
Facility: HOSPITAL | Age: 57
Discharge: HOME | End: 2022-11-10
Attending: COLON & RECTAL SURGERY | Admitting: COLON & RECTAL SURGERY
Payer: COMMERCIAL

## 2022-11-10 ENCOUNTER — ANESTHESIA EVENT (OUTPATIENT)
Dept: ENDOSCOPY | Facility: HOSPITAL | Age: 57
End: 2022-11-10
Payer: COMMERCIAL

## 2022-11-10 VITALS
OXYGEN SATURATION: 98 % | HEART RATE: 71 BPM | RESPIRATION RATE: 18 BRPM | SYSTOLIC BLOOD PRESSURE: 100 MMHG | DIASTOLIC BLOOD PRESSURE: 64 MMHG

## 2022-11-10 DIAGNOSIS — Z86.010 HX OF COLONIC POLYPS: ICD-10-CM

## 2022-11-10 PROCEDURE — 37000009 HC ANESTHESIA EA ADD 15 MINS: Performed by: COLON & RECTAL SURGERY

## 2022-11-10 PROCEDURE — 45385 PR COLONOSCOPY,REMV LESN,SNARE: ICD-10-PCS | Mod: 33,,, | Performed by: COLON & RECTAL SURGERY

## 2022-11-10 PROCEDURE — 88342 CHG IMMUNOCYTOCHEMISTRY: ICD-10-PCS | Mod: 26,,, | Performed by: PATHOLOGY

## 2022-11-10 PROCEDURE — 45380 PR COLONOSCOPY,BIOPSY: ICD-10-PCS | Mod: 33,59,, | Performed by: COLON & RECTAL SURGERY

## 2022-11-10 PROCEDURE — 00811 ANES LWR INTST NDSC NOS: CPT | Mod: QZ,P2,33 | Performed by: NURSE ANESTHETIST, CERTIFIED REGISTERED

## 2022-11-10 PROCEDURE — 45380 COLONOSCOPY AND BIOPSY: CPT | Mod: PT,59 | Performed by: COLON & RECTAL SURGERY

## 2022-11-10 PROCEDURE — 88305 TISSUE EXAM BY PATHOLOGIST: CPT | Mod: 26,,, | Performed by: PATHOLOGY

## 2022-11-10 PROCEDURE — 88341 IMHCHEM/IMCYTCHM EA ADD ANTB: CPT | Mod: 26,,, | Performed by: PATHOLOGY

## 2022-11-10 PROCEDURE — D9220AH HC ANESTHESIA PROFESSIONAL FEE: Mod: QZ,P2,33 | Performed by: NURSE ANESTHETIST, CERTIFIED REGISTERED

## 2022-11-10 PROCEDURE — 27201012 HC FORCEPS, HOT/COLD, DISP: Performed by: COLON & RECTAL SURGERY

## 2022-11-10 PROCEDURE — 45385 COLONOSCOPY W/LESION REMOVAL: CPT | Mod: PT | Performed by: COLON & RECTAL SURGERY

## 2022-11-10 PROCEDURE — 88341 PR IHC OR ICC EACH ADD'L SINGLE ANTIBODY  STAINPR: ICD-10-PCS | Mod: 26,,, | Performed by: PATHOLOGY

## 2022-11-10 PROCEDURE — 27201089 HC SNARE, DISP (ANY): Performed by: COLON & RECTAL SURGERY

## 2022-11-10 PROCEDURE — 88305 TISSUE EXAM BY PATHOLOGIST: ICD-10-PCS | Mod: 26,,, | Performed by: PATHOLOGY

## 2022-11-10 PROCEDURE — 45385 COLONOSCOPY W/LESION REMOVAL: CPT | Mod: 33,,, | Performed by: COLON & RECTAL SURGERY

## 2022-11-10 PROCEDURE — 63600175 PHARM REV CODE 636 W HCPCS: Performed by: COLON & RECTAL SURGERY

## 2022-11-10 PROCEDURE — 37000008 HC ANESTHESIA 1ST 15 MINUTES: Performed by: COLON & RECTAL SURGERY

## 2022-11-10 PROCEDURE — 88305 TISSUE EXAM BY PATHOLOGIST: CPT | Performed by: PATHOLOGY

## 2022-11-10 PROCEDURE — 45380 COLONOSCOPY AND BIOPSY: CPT | Mod: 33,59,, | Performed by: COLON & RECTAL SURGERY

## 2022-11-10 PROCEDURE — 25000003 PHARM REV CODE 250: Performed by: NURSE ANESTHETIST, CERTIFIED REGISTERED

## 2022-11-10 PROCEDURE — 63600175 PHARM REV CODE 636 W HCPCS: Performed by: NURSE ANESTHETIST, CERTIFIED REGISTERED

## 2022-11-10 PROCEDURE — 88342 IMHCHEM/IMCYTCHM 1ST ANTB: CPT | Mod: 26,,, | Performed by: PATHOLOGY

## 2022-11-10 RX ORDER — SODIUM CHLORIDE, SODIUM LACTATE, POTASSIUM CHLORIDE, CALCIUM CHLORIDE 600; 310; 30; 20 MG/100ML; MG/100ML; MG/100ML; MG/100ML
INJECTION, SOLUTION INTRAVENOUS CONTINUOUS
Status: DISCONTINUED | OUTPATIENT
Start: 2022-11-10 | End: 2022-11-10 | Stop reason: HOSPADM

## 2022-11-10 RX ORDER — PROPOFOL 10 MG/ML
INJECTION, EMULSION INTRAVENOUS
Status: DISCONTINUED | OUTPATIENT
Start: 2022-11-10 | End: 2022-11-10

## 2022-11-10 RX ORDER — LIDOCAINE HCL/PF 100 MG/5ML
SYRINGE (ML) INTRAVENOUS
Status: DISCONTINUED | OUTPATIENT
Start: 2022-11-10 | End: 2022-11-10

## 2022-11-10 RX ADMIN — PROPOFOL 50 MG: 10 INJECTION, EMULSION INTRAVENOUS at 11:11

## 2022-11-10 RX ADMIN — PROPOFOL 50 MG: 10 INJECTION, EMULSION INTRAVENOUS at 10:11

## 2022-11-10 RX ADMIN — LIDOCAINE HYDROCHLORIDE 50 MG: 20 INJECTION, SOLUTION INTRAVENOUS at 10:11

## 2022-11-10 RX ADMIN — PROPOFOL 100 MG: 10 INJECTION, EMULSION INTRAVENOUS at 10:11

## 2022-11-10 RX ADMIN — SODIUM CHLORIDE, SODIUM LACTATE, POTASSIUM CHLORIDE, AND CALCIUM CHLORIDE: .6; .31; .03; .02 INJECTION, SOLUTION INTRAVENOUS at 10:11

## 2022-11-10 NOTE — TRANSFER OF CARE
Anesthesia Transfer of Care Note    Patient: Sakina CHURCHILL Denis    Procedure(s) Performed: Procedure(s) (LRB):  COLONOSCOPY (N/A)    Patient location: PACU    Anesthesia Type: general    Transport from OR: Transported from OR on 6-10 L/min O2 by face mask with adequate spontaneous ventilation    Post pain: adequate analgesia    Post assessment: no apparent anesthetic complications and tolerated procedure well    Post vital signs: stable    Level of consciousness: sedated    Nausea/Vomiting: no nausea/vomiting    Complications: none    Transfer of care protocol was followed      Last vitals:   Visit Vitals  /85   Pulse 65   Resp 18   SpO2 98%   Breastfeeding No

## 2022-11-10 NOTE — ANESTHESIA PREPROCEDURE EVALUATION
11/10/2022  Sakina Barrios is a 57 y.o., female.    Pre-op Assessment    I have reviewed the Patient Summary Reports.    I have reviewed the Nursing Notes. I have reviewed the NPO Status.   I have reviewed the Medications.     Review of Systems  Anesthesia Hx:  No problems with previous Anesthesia  History of prior surgery of interest to airway management or planning:  Denies Personal Hx of Anesthesia complications.   Social:  Smoker, No Alcohol Use 1ppd smoker   Hematology/Oncology:  Hematology Normal   Oncology Normal     EENT/Dental:EENT/Dental Normal   Cardiovascular:   Exercise tolerance: good Hypertension, well controlled    Pulmonary:   Sleep Apnea, CPAP    Renal/:  Renal/ Normal     Hepatic/GI:   GERD, well controlled    Musculoskeletal:  Musculoskeletal Normal    Neurological:   Neuromuscular Disease, Jan 2019 fall with minor SAH (no surgery) with some intermittent memory disturbances, Sciatica, fibromyalgia, cervical myofascia pain syndrome with planned cervical injections, FROM.   Endocrine:  Endocrine Normal    Dermatological:  Skin Normal    Psych:   Psychiatric History          Physical Exam  General:  Obesity      Airway/Jaw/Neck:  Airway Findings: Mouth Opening: Normal   Tongue: Normal   General Airway Assessment: Adult Mallampati: II  TM Distance: Normal, at least 6 cm   Jaw/Neck Findings:  Neck ROM: Normal ROM       Dental:  Dental Findings: Upper Dentures           Mental Status:  Mental Status Findings:  Cooperative, Alert and Oriented         Anesthesia Plan  Type of Anesthesia, risks & benefits discussed:  Anesthesia Type:  Gen Natural Airway    Patient's Preference:   Plan Factors:          Intra-op Monitoring Plan: standard ASA monitors  Intra-op Monitoring Plan Comments:   Post Op Pain Control Plan:   Post Op Pain Control Plan Comments:     Induction:   IV  Beta Blocker:   Patient is not currently on a Beta-Blocker (No further documentation required).       Informed Consent: Informed consent signed with the Patient and all parties understand the risks and agree with anesthesia plan.  All questions answered.  Anesthesia consent signed with patient.  ASA Score: 2     Day of Surgery Review of History & Physical: I have interviewed and examined the patient. I have reviewed the patient's H&P dated: 11/10/22.  There are no significant changes.  H&P Update referred to the surgeon/provider.          Ready For Surgery From Anesthesia Perspective.           Physical Exam  General: Obesity    Airway:  Mallampati: II   Mouth Opening: Normal  TM Distance: Normal, at least 6 cm  Tongue: Normal  Neck ROM: Normal ROM    Dental:  Upper Dentures          Anesthesia Plan  Type of Anesthesia, risks & benefits discussed:    Anesthesia Type: Gen Natural Airway  Intra-op Monitoring Plan: standard ASA monitors  Induction:  IV  Informed Consent: Informed consent signed with the Patient and all parties understand the risks and agree with anesthesia plan.  All questions answered.   ASA Score: 2  Day of Surgery Review of History & Physical: H&P Update referred to the surgeon/provider.I have interviewed and examined the patient. I have reviewed the patient's H&P dated: 11/10/22. There are no significant changes.     Ready For Surgery From Anesthesia Perspective.       .

## 2022-11-10 NOTE — H&P
Procedure : Colonoscopy    Indication(s):  personal history of colon polyps    Review of patient's allergies indicates:  No Known Allergies    Past Medical History:   Diagnosis Date    Carpal tunnel syndrome     both arms    Concussion 01/2019    With bleeding on the brain    Depression     Fibromyalgia     Hyperlipemia     Hypertension     Mental disorder     depression    Sleep apnea     Subarachnoid hemorrhage        Prior to Admission medications    Medication Sig Start Date End Date Taking? Authorizing Provider   ALPRAZolam (XANAX) 1 MG tablet Take 1 mg by mouth 2 (two) times a day.   Yes Historical Provider   amitriptyline (ELAVIL) 25 MG tablet Take 25 mg by mouth nightly. 6/1/22  Yes Historical Provider   aspirin (ECOTRIN) 81 MG EC tablet Take 81 mg by mouth once daily.   Yes Historical Provider   atenoloL (TENORMIN) 25 MG tablet Take 25 mg by mouth once daily.   Yes Historical Provider   chlorzoxazone (PARAFON FORTE) 500 mg Tab Take 500 mg by mouth 3 (three) times daily as needed. 6/1/22  Yes Historical Provider   cholecalciferol, vitamin D3, 125 mcg (5,000 unit) Tab Take 5,000 Units by mouth once daily.   Yes Historical Provider   CRESTOR 20 mg tablet Take 20 mg by mouth every evening.  1/7/15  Yes Historical Provider   DULoxetine (CYMBALTA) 30 MG capsule Take 30 mg by mouth once daily. 5/31/22  Yes Historical Provider   DULoxetine (CYMBALTA) 60 MG capsule Take 60 mg by mouth every morning. 8/17/18  Yes Historical Provider   gabapentin (NEURONTIN) 300 MG capsule Take 1 capsule (300 mg total) by mouth 3 (three) times daily. 4/14/22  Yes Ksenia Rosario NP   hydroCHLOROthiazide (HYDRODIURIL) 25 MG tablet Take 25 mg by mouth once daily. 7/24/19  Yes Historical Provider   meloxicam (MOBIC) 15 MG tablet Take 15 mg by mouth once daily. 6/1/22  Yes Historical Provider   omeprazole (PRILOSEC) 40 MG capsule Take 1 capsule by mouth once daily. 10/4/17  Yes Historical Provider   potassium chloride SA  (K-DUR,KLOR-CON) 10 MEQ tablet Take 20 mEq by mouth 2 (two) times daily.  6/30/18  Yes Historical Provider   QUEtiapine (SEROQUEL) 200 MG Tab Take 200 mg by mouth every evening.   Yes Historical Provider   traMADoL (ULTRAM) 50 mg tablet Take 50 mg by mouth 4 (four) times daily as needed. 12/16/21  Yes Historical Provider   UNABLE TO FIND THC - flower and drops- qid prn   Yes Historical Provider   vitamin E 400 UNIT capsule Take 400 Units by mouth once daily.   Yes Historical Provider   albuterol (PROVENTIL/VENTOLIN HFA) 90 mcg/actuation inhaler albuterol sulfate HFA 90 mcg/actuation aerosol inhaler    Historical Provider   fluticasone propionate (FLONASE) 50 mcg/actuation nasal spray fluticasone propionate 50 mcg/actuation nasal spray,suspension   SPRAY 1 SPRAY INTO EACH NOSTRIL ONCE DAILY AS DIRECTED    Historical Provider   ondansetron (ZOFRAN) 8 MG tablet Take 8 mg by mouth daily as needed.  8/20/20   Historical Provider   promethazine-dextromethorphan (PROMETHAZINE-DM) 6.25-15 mg/5 mL Syrp promethazine-DM 6.25 mg-15 mg/5 mL oral syrup   TAKE 1 TEASPOONFUL (5ML) BY MOUTH EVERY 6 HOURS AS NEEDED.    Historical Provider   scopolamine (TRANSDERM-SCOP) 1.3-1.5 mg (1 mg over 3 days) Transderm-Scop 1 mg over 3 days transdermal patch   APPLY BEHIND EAR 4 HOURS BEFORE EVENT AND CHANGE EVERY 3 DAYS.    Historical Provider       Sedation Problems: NO    Family History   Problem Relation Age of Onset    Hypertension Father     Diabetes Father     Coronary artery disease Father     Hypertension Mother     Breast cancer Mother 72    Colon cancer Neg Hx     Ovarian cancer Neg Hx        Fam Hx of Sedation Problems: NO    Social History     Socioeconomic History    Marital status:    Tobacco Use    Smoking status: Every Day     Packs/day: 1.00     Years: 30.00     Pack years: 30.00     Types: Cigarettes    Smokeless tobacco: Never   Substance and Sexual Activity    Alcohol use: No     Comment: No    Drug use: Yes      Types: Marijuana     Comment: medical use; anxiety and pain control    Sexual activity: Not Currently     Partners: Male     Birth control/protection: Surgical, See Surgical Hx     Comment: .       Review of Systems -     Respiratory ROS: no cough, shortness of breath, or wheezing  Cardiovascular ROS: no chest pain or dyspnea on exertion  Gastrointestinal ROS: no abdominal pain, change in bowel habits, or black or bloody stools  Musculoskeletal ROS: negative  Neurological ROS: no TIA or stroke symptoms        Physical Exam:  General: no distress  Head: normocephalic  Airway:  normal oropharynx, airway normal  Neck: supple, symmetrical, trachea midline  Lungs:  normal respiratory effort  Heart: regular rate and rhythm  Abdomen: soft, non-tender non-distented; bowel sounds normal; no masses,  no organomegaly  Extremities: no cyanosis or edema, or clubbing       Deep Sedation: Mallampati Score per anesthesia     SedationPlan :Moderate     ASA : II    Patient is medically cleared for anesthesia.    Anesthesia/Surgery risks, benefits and alternative options discussed and understood by patient/family.

## 2022-11-10 NOTE — DISCHARGE SUMMARY
Discharge Note  Short Stay      SUMMARY     Admit Date: 11/10/2022    Attending Physician: Twan Williamson MD     Discharge Physician: Twan Williamson MD    Discharge Date: 11/10/2022 11:30 AM    Admission Diagnosis: personal history of colon polyps    Final Diagnosis:  Colon polyps    Procedures Performed:    Colonoscopy polypectomy cold forceps and Colonoscopy polypectomy hot snare    Disposition: Home or Self Care    Condition at Discharge:  Stable    Patient Instructions:   Current Discharge Medication List        CONTINUE these medications which have NOT CHANGED    Details   ALPRAZolam (XANAX) 1 MG tablet Take 1 mg by mouth 2 (two) times a day.      amitriptyline (ELAVIL) 25 MG tablet Take 25 mg by mouth nightly.      aspirin (ECOTRIN) 81 MG EC tablet Take 81 mg by mouth once daily.      atenoloL (TENORMIN) 25 MG tablet Take 25 mg by mouth once daily.      chlorzoxazone (PARAFON FORTE) 500 mg Tab Take 500 mg by mouth 3 (three) times daily as needed.      cholecalciferol, vitamin D3, 125 mcg (5,000 unit) Tab Take 5,000 Units by mouth once daily.      CRESTOR 20 mg tablet Take 20 mg by mouth every evening.       !! DULoxetine (CYMBALTA) 30 MG capsule Take 30 mg by mouth once daily.      !! DULoxetine (CYMBALTA) 60 MG capsule Take 60 mg by mouth every morning.  Refills: 3      gabapentin (NEURONTIN) 300 MG capsule Take 1 capsule (300 mg total) by mouth 3 (three) times daily.  Qty: 270 capsule, Refills: 3    Associated Diagnoses: Fibromyalgia; Right sided sciatica; Neck pain; Chronic bilateral low back pain without sciatica      hydroCHLOROthiazide (HYDRODIURIL) 25 MG tablet Take 25 mg by mouth once daily.  Refills: 5      meloxicam (MOBIC) 15 MG tablet Take 15 mg by mouth once daily.      omeprazole (PRILOSEC) 40 MG capsule Take 1 capsule by mouth once daily.      potassium chloride SA (K-DUR,KLOR-CON) 10 MEQ tablet Take 20 mEq by mouth 2 (two) times daily.   Refills: 3      QUEtiapine (SEROQUEL) 200 MG Tab Take 200  mg by mouth every evening.      traMADoL (ULTRAM) 50 mg tablet Take 50 mg by mouth 4 (four) times daily as needed.      UNABLE TO FIND THC - flower and drops- qid prn      vitamin E 400 UNIT capsule Take 400 Units by mouth once daily.      albuterol (PROVENTIL/VENTOLIN HFA) 90 mcg/actuation inhaler albuterol sulfate HFA 90 mcg/actuation aerosol inhaler      fluticasone propionate (FLONASE) 50 mcg/actuation nasal spray fluticasone propionate 50 mcg/actuation nasal spray,suspension   SPRAY 1 SPRAY INTO EACH NOSTRIL ONCE DAILY AS DIRECTED      ondansetron (ZOFRAN) 8 MG tablet Take 8 mg by mouth daily as needed.       promethazine-dextromethorphan (PROMETHAZINE-DM) 6.25-15 mg/5 mL Syrp promethazine-DM 6.25 mg-15 mg/5 mL oral syrup   TAKE 1 TEASPOONFUL (5ML) BY MOUTH EVERY 6 HOURS AS NEEDED.      scopolamine (TRANSDERM-SCOP) 1.3-1.5 mg (1 mg over 3 days) Transderm-Scop 1 mg over 3 days transdermal patch   APPLY BEHIND EAR 4 HOURS BEFORE EVENT AND CHANGE EVERY 3 DAYS.       !! - Potential duplicate medications found. Please discuss with provider.          Discharge Procedure Orders (must include Diet, Follow-up, Activity)   Discharge Procedure Orders (must include Diet, Follow-up, Activity)   Diet Adult Regular     Activity as tolerated        Repeat colonoscopy 5 years.  High fiber diet

## 2022-11-10 NOTE — ANESTHESIA POSTPROCEDURE EVALUATION
Anesthesia Post Evaluation    Patient: Sakina CHURCHILL Denis    Procedure(s) Performed: Procedure(s) (LRB):  COLONOSCOPY (N/A)    Final Anesthesia Type: general      Patient location during evaluation: PACU  Patient participation: Yes- Able to Participate  Level of consciousness: awake and alert and oriented  Post-procedure vital signs: reviewed and stable  Pain management: adequate  Airway patency: patent    PONV status at discharge: No PONV  Anesthetic complications: no      Cardiovascular status: blood pressure returned to baseline and hemodynamically stable  Respiratory status: unassisted, spontaneous ventilation and room air  Hydration status: euvolemic  Follow-up not needed.          Vitals Value Taken Time   /64 11/10/22 1144   Temp  11/10/22 1158   Pulse 71 11/10/22 1156   Resp 18 11/10/22 1156   SpO2 98 % 11/10/22 1156         Event Time   Out of Recovery 11:57:32         Pain/Celso Score: Celso Score: 10 (11/10/2022 11:44 AM)

## 2022-11-10 NOTE — OP NOTE
Patient Name: Sakina Barrios   Procedure Date: 11/10/2022  MRN: [id number]  : 1965  Age: 57 y.o.  Gender: female   Referring Physician :  Donovan Mendez PA-C  Plan for Procedure: Monitored anaesthesia care  Indication: personal history of colon polyps  Procedure:   Colonoscopy polypectomy cold forceps and Colonoscopy polypectomy hot snare    Surgeon(s) and Role:     * Twan Williamson MD - Primary    Complications: None     Medicines: monitored anesthesia care    Procedure:  Prior to the procedure, a History and Physical was performed, and patient medications, allergies and sensitivities were reviewed.  The patient's tolerance of previous anesthesia was reviewed. The risks and benefits of the procedure and the sedation options and risks were discussed with the patient.  All questions were answered and informed consent was obtained.    After I obtained informed consent, the scope was passed under direct vision.  Throughout the procedure, the patient's blood pressure, pulse, and oxygen saturations were monitored continuously.  The Olympus scope CF - FF732I was introduced through the anus and advanced to the cecum, identified by appendiceal orifice and ileocecal valve.  The colonoscopy was performed without difficulty.  The patient tolerated the procedure well.  The quality of the bowel preparation was good     Findings:  polyp(s) #1, 8 mm in size, located in the descending colon removed by snare cautery and retrieved for pathology  #2, 4 mm in size, located in the rectum removed by cold biopsy and sent for pathology  #3, 3 mm in size, located in the rectum removed by cold biopsy and sent for pathology    EBL: none    Impression:  Three benign appearing colon polyps, removed as above.    Recommendation:  Repeat exam: 5 years  Return to my office prn  High fiber diet

## 2022-11-21 LAB
FINAL PATHOLOGIC DIAGNOSIS: NORMAL
GROSS: NORMAL
Lab: NORMAL

## 2023-05-30 ENCOUNTER — OFFICE VISIT (OUTPATIENT)
Dept: NEUROLOGY | Facility: CLINIC | Age: 58
End: 2023-05-30
Payer: COMMERCIAL

## 2023-05-30 VITALS
BODY MASS INDEX: 24.2 KG/M2 | WEIGHT: 131.5 LBS | RESPIRATION RATE: 14 BRPM | SYSTOLIC BLOOD PRESSURE: 122 MMHG | OXYGEN SATURATION: 99 % | HEIGHT: 62 IN | DIASTOLIC BLOOD PRESSURE: 70 MMHG | HEART RATE: 73 BPM

## 2023-05-30 DIAGNOSIS — R29.818 OTHER SYMPTOMS AND SIGNS INVOLVING THE NERVOUS SYSTEM: ICD-10-CM

## 2023-05-30 DIAGNOSIS — M54.16 CHRONIC RADICULAR LUMBAR PAIN: ICD-10-CM

## 2023-05-30 DIAGNOSIS — R29.6 FREQUENT FALLS: ICD-10-CM

## 2023-05-30 DIAGNOSIS — R41.3 MEMORY LOSS: ICD-10-CM

## 2023-05-30 DIAGNOSIS — R26.89 FUNCTIONAL GAIT ABNORMALITY: Primary | ICD-10-CM

## 2023-05-30 DIAGNOSIS — R15.9 INCONTINENCE OF FECES, UNSPECIFIED FECAL INCONTINENCE TYPE: ICD-10-CM

## 2023-05-30 DIAGNOSIS — I10 HYPERTENSION, UNSPECIFIED TYPE: ICD-10-CM

## 2023-05-30 DIAGNOSIS — E78.5 HYPERLIPIDEMIA, UNSPECIFIED HYPERLIPIDEMIA TYPE: ICD-10-CM

## 2023-05-30 DIAGNOSIS — G89.29 CHRONIC RADICULAR LUMBAR PAIN: ICD-10-CM

## 2023-05-30 DIAGNOSIS — F45.0 SOMATIZATION DISORDER: ICD-10-CM

## 2023-05-30 DIAGNOSIS — R20.0 BILATERAL LEG NUMBNESS: ICD-10-CM

## 2023-05-30 DIAGNOSIS — M54.9 DORSALGIA, UNSPECIFIED: ICD-10-CM

## 2023-05-30 DIAGNOSIS — M79.7 FIBROMYALGIA: ICD-10-CM

## 2023-05-30 PROCEDURE — 99214 PR OFFICE/OUTPT VISIT, EST, LEVL IV, 30-39 MIN: ICD-10-PCS | Mod: S$GLB,,, | Performed by: NURSE PRACTITIONER

## 2023-05-30 PROCEDURE — 1160F RVW MEDS BY RX/DR IN RCRD: CPT | Mod: CPTII,S$GLB,, | Performed by: NURSE PRACTITIONER

## 2023-05-30 PROCEDURE — 3074F SYST BP LT 130 MM HG: CPT | Mod: CPTII,S$GLB,, | Performed by: NURSE PRACTITIONER

## 2023-05-30 PROCEDURE — 1160F PR REVIEW ALL MEDS BY PRESCRIBER/CLIN PHARMACIST DOCUMENTED: ICD-10-PCS | Mod: CPTII,S$GLB,, | Performed by: NURSE PRACTITIONER

## 2023-05-30 PROCEDURE — 3074F PR MOST RECENT SYSTOLIC BLOOD PRESSURE < 130 MM HG: ICD-10-PCS | Mod: CPTII,S$GLB,, | Performed by: NURSE PRACTITIONER

## 2023-05-30 PROCEDURE — 3008F PR BODY MASS INDEX (BMI) DOCUMENTED: ICD-10-PCS | Mod: CPTII,S$GLB,, | Performed by: NURSE PRACTITIONER

## 2023-05-30 PROCEDURE — 3078F PR MOST RECENT DIASTOLIC BLOOD PRESSURE < 80 MM HG: ICD-10-PCS | Mod: CPTII,S$GLB,, | Performed by: NURSE PRACTITIONER

## 2023-05-30 PROCEDURE — 1159F PR MEDICATION LIST DOCUMENTED IN MEDICAL RECORD: ICD-10-PCS | Mod: CPTII,S$GLB,, | Performed by: NURSE PRACTITIONER

## 2023-05-30 PROCEDURE — 1159F MED LIST DOCD IN RCRD: CPT | Mod: CPTII,S$GLB,, | Performed by: NURSE PRACTITIONER

## 2023-05-30 PROCEDURE — 3008F BODY MASS INDEX DOCD: CPT | Mod: CPTII,S$GLB,, | Performed by: NURSE PRACTITIONER

## 2023-05-30 PROCEDURE — 99999 PR PBB SHADOW E&M-EST. PATIENT-LVL V: CPT | Mod: PBBFAC,,, | Performed by: NURSE PRACTITIONER

## 2023-05-30 PROCEDURE — 99999 PR PBB SHADOW E&M-EST. PATIENT-LVL V: ICD-10-PCS | Mod: PBBFAC,,, | Performed by: NURSE PRACTITIONER

## 2023-05-30 PROCEDURE — 3078F DIAST BP <80 MM HG: CPT | Mod: CPTII,S$GLB,, | Performed by: NURSE PRACTITIONER

## 2023-05-30 PROCEDURE — 99214 OFFICE O/P EST MOD 30 MIN: CPT | Mod: S$GLB,,, | Performed by: NURSE PRACTITIONER
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
## 2023-06-08 ENCOUNTER — HOSPITAL ENCOUNTER (OUTPATIENT)
Dept: RADIOLOGY | Facility: HOSPITAL | Age: 58
Discharge: HOME | End: 2023-06-08
Attending: NURSE PRACTITIONER
Payer: COMMERCIAL

## 2023-06-08 DIAGNOSIS — R29.818 OTHER SYMPTOMS AND SIGNS INVOLVING THE NERVOUS SYSTEM: ICD-10-CM

## 2023-06-08 DIAGNOSIS — R26.9 FUNCTIONAL GAIT DISORDER: Primary | ICD-10-CM

## 2023-06-08 DIAGNOSIS — R15.9 INCONTINENCE OF FECES, UNSPECIFIED FECAL INCONTINENCE TYPE: ICD-10-CM

## 2023-06-08 DIAGNOSIS — M54.16 CHRONIC RADICULAR LUMBAR PAIN: ICD-10-CM

## 2023-06-08 DIAGNOSIS — G89.29 CHRONIC RADICULAR LUMBAR PAIN: ICD-10-CM

## 2023-06-08 DIAGNOSIS — R20.0 BILATERAL LEG NUMBNESS: ICD-10-CM

## 2023-06-08 DIAGNOSIS — R29.6 FREQUENT FALLS: ICD-10-CM

## 2023-06-08 PROCEDURE — 72148 MRI LUMBAR SPINE W/O DYE: CPT | Mod: TC

## 2023-06-08 PROCEDURE — 70551 MRI BRAIN STEM W/O DYE: CPT | Mod: TC

## 2023-07-28 ENCOUNTER — TELEPHONE (OUTPATIENT)
Dept: SURGERY | Facility: CLINIC | Age: 58
End: 2023-07-28
Payer: COMMERCIAL

## 2023-07-28 NOTE — TELEPHONE ENCOUNTER
Called pt back re: her message.  Pt would like an appt with Dr. Williamson in [place] for stool incontinence.  RN let pt know that Dr. Williamson is at the [facility] location and Dr. Saenz is now in [place].  Pt will meet with Dr. Saenz on 9/14 at 2:20pm.  Appt details reviewed with pt and I will also place a letter in the mail for her.  Pt verbalized understanding to all.

## 2023-11-28 ENCOUNTER — HOSPITAL ENCOUNTER (OUTPATIENT)
Dept: RADIOLOGY | Facility: HOSPITAL | Age: 58
Discharge: HOME | End: 2023-11-28
Attending: OBSTETRICS & GYNECOLOGY
Payer: COMMERCIAL

## 2023-11-28 ENCOUNTER — OFFICE VISIT (OUTPATIENT)
Dept: OBSTETRICS AND GYNECOLOGY | Facility: CLINIC | Age: 58
End: 2023-11-28
Attending: OBSTETRICS & GYNECOLOGY
Payer: COMMERCIAL

## 2023-11-28 VITALS — HEIGHT: 62 IN | WEIGHT: 131 LBS | BODY MASS INDEX: 24.11 KG/M2

## 2023-11-28 VITALS
HEART RATE: 63 BPM | WEIGHT: 135.38 LBS | BODY MASS INDEX: 24.91 KG/M2 | DIASTOLIC BLOOD PRESSURE: 72 MMHG | SYSTOLIC BLOOD PRESSURE: 114 MMHG | HEIGHT: 62 IN

## 2023-11-28 DIAGNOSIS — Z01.419 WELL WOMAN EXAM WITH ROUTINE GYNECOLOGICAL EXAM: Primary | ICD-10-CM

## 2023-11-28 DIAGNOSIS — Z90.79 HISTORY OF TOTAL HYSTERECTOMY WITH BILATERAL SALPINGO-OOPHORECTOMY (BSO): ICD-10-CM

## 2023-11-28 DIAGNOSIS — Z12.31 BREAST CANCER SCREENING BY MAMMOGRAM: ICD-10-CM

## 2023-11-28 DIAGNOSIS — Z90.710 HISTORY OF TOTAL HYSTERECTOMY WITH BILATERAL SALPINGO-OOPHORECTOMY (BSO): ICD-10-CM

## 2023-11-28 DIAGNOSIS — Z90.722 HISTORY OF TOTAL HYSTERECTOMY WITH BILATERAL SALPINGO-OOPHORECTOMY (BSO): ICD-10-CM

## 2023-11-28 PROCEDURE — 99396 PREV VISIT EST AGE 40-64: CPT | Mod: S$GLB,,, | Performed by: OBSTETRICS & GYNECOLOGY

## 2023-11-28 PROCEDURE — 3074F SYST BP LT 130 MM HG: CPT | Mod: CPTII,S$GLB,, | Performed by: OBSTETRICS & GYNECOLOGY

## 2023-11-28 PROCEDURE — 3008F BODY MASS INDEX DOCD: CPT | Mod: CPTII,S$GLB,, | Performed by: OBSTETRICS & GYNECOLOGY

## 2023-11-28 PROCEDURE — 99396 PR PREVENTIVE VISIT,EST,40-64: ICD-10-PCS | Mod: S$GLB,,, | Performed by: OBSTETRICS & GYNECOLOGY

## 2023-11-28 PROCEDURE — 4010F PR ACE/ARB THEARPY RXD/TAKEN: ICD-10-PCS | Mod: CPTII,S$GLB,, | Performed by: OBSTETRICS & GYNECOLOGY

## 2023-11-28 PROCEDURE — 77067 SCR MAMMO BI INCL CAD: CPT | Mod: 26,,, | Performed by: RADIOLOGY

## 2023-11-28 PROCEDURE — 3078F DIAST BP <80 MM HG: CPT | Mod: CPTII,S$GLB,, | Performed by: OBSTETRICS & GYNECOLOGY

## 2023-11-28 PROCEDURE — 1159F PR MEDICATION LIST DOCUMENTED IN MEDICAL RECORD: ICD-10-PCS | Mod: CPTII,S$GLB,, | Performed by: OBSTETRICS & GYNECOLOGY

## 2023-11-28 PROCEDURE — 77067 SCR MAMMO BI INCL CAD: CPT | Mod: TC

## 2023-11-28 PROCEDURE — 77063 MAMMO DIGITAL SCREENING BILAT WITH TOMO: ICD-10-PCS | Mod: 26,,, | Performed by: RADIOLOGY

## 2023-11-28 PROCEDURE — 1159F MED LIST DOCD IN RCRD: CPT | Mod: CPTII,S$GLB,, | Performed by: OBSTETRICS & GYNECOLOGY

## 2023-11-28 PROCEDURE — 77063 BREAST TOMOSYNTHESIS BI: CPT | Mod: 26,,, | Performed by: RADIOLOGY

## 2023-11-28 PROCEDURE — 77067 MAMMO DIGITAL SCREENING BILAT WITH TOMO: ICD-10-PCS | Mod: 26,,, | Performed by: RADIOLOGY

## 2023-11-28 PROCEDURE — 99999 PR PBB SHADOW E&M-EST. PATIENT-LVL III: ICD-10-PCS | Mod: PBBFAC,,, | Performed by: OBSTETRICS & GYNECOLOGY

## 2023-11-28 PROCEDURE — 4010F ACE/ARB THERAPY RXD/TAKEN: CPT | Mod: CPTII,S$GLB,, | Performed by: OBSTETRICS & GYNECOLOGY

## 2023-11-28 PROCEDURE — 3008F PR BODY MASS INDEX (BMI) DOCUMENTED: ICD-10-PCS | Mod: CPTII,S$GLB,, | Performed by: OBSTETRICS & GYNECOLOGY

## 2023-11-28 PROCEDURE — 3074F PR MOST RECENT SYSTOLIC BLOOD PRESSURE < 130 MM HG: ICD-10-PCS | Mod: CPTII,S$GLB,, | Performed by: OBSTETRICS & GYNECOLOGY

## 2023-11-28 PROCEDURE — 99999 PR PBB SHADOW E&M-EST. PATIENT-LVL III: CPT | Mod: PBBFAC,,, | Performed by: OBSTETRICS & GYNECOLOGY

## 2023-11-28 PROCEDURE — 3078F PR MOST RECENT DIASTOLIC BLOOD PRESSURE < 80 MM HG: ICD-10-PCS | Mod: CPTII,S$GLB,, | Performed by: OBSTETRICS & GYNECOLOGY

## 2023-11-28 RX ORDER — CELECOXIB 200 MG/1
200 CAPSULE ORAL 2 TIMES DAILY
COMMUNITY
Start: 2023-06-02 | End: 2023-11-28

## 2023-11-28 RX ORDER — TRAMADOL HYDROCHLORIDE 50 MG/1
50 TABLET ORAL 2 TIMES DAILY PRN
COMMUNITY
Start: 2023-07-11

## 2023-11-28 RX ORDER — SULFAMETHOXAZOLE AND TRIMETHOPRIM 800; 160 MG/1; MG/1
1 TABLET ORAL 2 TIMES DAILY
COMMUNITY
Start: 2023-08-09 | End: 2023-11-28

## 2023-11-28 RX ORDER — HYDROCHLOROTHIAZIDE 12.5 MG/1
12.5 CAPSULE ORAL
COMMUNITY
Start: 2023-10-06

## 2023-11-28 RX ORDER — ROSUVASTATIN CALCIUM 40 MG/1
40 TABLET, COATED ORAL
COMMUNITY
Start: 2023-10-06

## 2023-11-28 RX ORDER — LOSARTAN POTASSIUM 25 MG/1
25 TABLET ORAL
COMMUNITY
Start: 2023-10-06

## 2023-11-28 RX ORDER — LOSARTAN POTASSIUM AND HYDROCHLOROTHIAZIDE 12.5; 5 MG/1; MG/1
1 TABLET ORAL
COMMUNITY
Start: 2023-06-14 | End: 2023-11-28

## 2023-11-28 RX ORDER — IBUPROFEN 800 MG/1
800 TABLET ORAL
COMMUNITY
Start: 2023-09-05 | End: 2023-11-28

## 2023-11-28 RX ORDER — FLUTICASONE PROPIONATE 50 MCG
1 SPRAY, SUSPENSION (ML) NASAL
COMMUNITY
Start: 2023-09-07

## 2023-11-28 RX ORDER — PREDNISONE 20 MG/1
40 TABLET ORAL
COMMUNITY
Start: 2023-09-05 | End: 2023-11-28

## 2023-11-28 RX ORDER — CYCLOBENZAPRINE HCL 5 MG
5 TABLET ORAL 3 TIMES DAILY
COMMUNITY
Start: 2023-07-11 | End: 2023-11-28

## 2023-11-28 RX ORDER — ALPRAZOLAM 0.5 MG/1
0.5 TABLET ORAL
COMMUNITY
Start: 2023-09-13

## 2023-11-28 NOTE — PROGRESS NOTES
Subjective:    Patient ID: Sakina Barrios is a 58 y.o. y.o. female.     Chief Complaint: Annual Well Woman Exam     History of Present Illness:  Sakina presents today for Annual Well Woman exam. She describes her menses as  absent, h/o hysterectomy and BSO .She denies pelvic pain.  She admits to breast tenderness, but denies masses, nipple discharge. MMG done today. She denies GYN complaints. She denies difficulty with urination. She has follow up with colorectal for incontinence with bowel movements tomorrow. She denies menopausal symptoms such as hotflashes, vaginal dryness, and night sweats. She denies bloating, early satiety, or weight changes. She is not sexually active.     Menstrual History:   No LMP recorded. Patient has had a hysterectomy..     OB History    : 4  Para: 3  Term: 3  : 1  Living: 3  Spontaneous : 1  Live Births: 3            The following portions of the patient's history were reviewed and updated as appropriate: allergies, current medications, past family history, past medical history, past social history, past surgical history, and problem list.    ROS:   Review of Systems   Constitutional:  Negative for chills, diaphoresis, fatigue, fever and unexpected weight change.   HENT:  Negative for congestion, hearing loss, postnasal drip, rhinorrhea, sinus pressure, sinus pain, sore throat and tinnitus.    Eyes:  Negative for pain, discharge and visual disturbance.   Respiratory:  Negative for apnea, cough, shortness of breath and wheezing.    Cardiovascular:  Negative for chest pain, palpitations and leg swelling.   Gastrointestinal:  Negative for abdominal pain, constipation, diarrhea, nausea and vomiting.   Endocrine: Negative for cold intolerance, heat intolerance, polydipsia, polyphagia and polyuria.   Genitourinary:  Negative for difficulty urinating, dyspareunia, dysuria, enuresis, flank pain, frequency, genital sores, hematuria, menstrual problem, pelvic pain,  urgency, vaginal bleeding, vaginal discharge and vaginal pain.   Musculoskeletal:  Positive for back pain, myalgias and neck pain. Negative for arthralgias, joint swelling and neck stiffness.   Skin:  Negative for color change, pallor and rash.   Allergic/Immunologic: Negative for environmental allergies, food allergies and immunocompromised state.   Neurological:  Negative for dizziness, weakness, light-headedness, numbness and headaches.   Hematological:  Negative for adenopathy. Does not bruise/bleed easily.   Psychiatric/Behavioral:  Positive for agitation, confusion, decreased concentration and sleep disturbance. The patient is nervous/anxious.          Objective:    Vital Signs:  Vitals:    11/28/23 1129   BP: 114/72   Pulse: 63       Physical Exam:  General:  alert, cooperative, no distress   Skin:  Skin color, texture, turgor normal. No rashes or lesions   HEENT:  extra ocular movement intact, sclera clear, anicteric   Neck: supple, trachea midline, no adenopathy or thyromegally   Respiratory:  Normal effort   Breasts:  symmetric fibrous changes in both upper outer quadrants, no discharge, erythema, tenderness, or palpable masses; no axillary lymphadenopathy   Abdomen:  soft, nontender, no palpable masses   Pelvis: External genitalia: normal general appearance  Urinary system: urethral meatus normal, bladder nontender  Vaginal: normal mucosa without prolapse or lesions  Cervix: removed surgically  Uterus: removed surgically  Adnexa: removed surgically   Extremities: Normal ROM; no edema, no cyanosis   Neurologial: Normal strength and tone. No focal numbness or weakness.   Psychiatric: normal mood, speech, dress, and thought processes         Assessment:       Healthy female exam.     1. Well woman exam with routine gynecological exam    2. History of total hysterectomy with bilateral salpingo-oophorectomy (BSO)          Plan:      Well woman exam with routine gynecological exam    History of total  hysterectomy with bilateral salpingo-oophorectomy (BSO)      MMG done today; pending.     COUNSELING:  Sakina was counseled on A.C.O.G. Pap guidelines and recommendations for yearly pelvic exams in addition to recommendations for monthly self breast exams; to see her PCP for other health maintenance.

## 2023-11-29 ENCOUNTER — OFFICE VISIT (OUTPATIENT)
Dept: SURGERY | Facility: CLINIC | Age: 58
End: 2023-11-29
Payer: COMMERCIAL

## 2023-11-29 VITALS
WEIGHT: 133.38 LBS | BODY MASS INDEX: 24.4 KG/M2 | HEART RATE: 65 BPM | SYSTOLIC BLOOD PRESSURE: 112 MMHG | DIASTOLIC BLOOD PRESSURE: 78 MMHG

## 2023-11-29 DIAGNOSIS — K59.09 CHRONIC CONSTIPATION WITH OVERFLOW INCONTINENCE: ICD-10-CM

## 2023-11-29 DIAGNOSIS — R15.9 INCONTINENCE OF FECES, UNSPECIFIED FECAL INCONTINENCE TYPE: Primary | ICD-10-CM

## 2023-11-29 PROCEDURE — 99204 PR OFFICE/OUTPT VISIT, NEW, LEVL IV, 45-59 MIN: ICD-10-PCS | Mod: S$GLB,,, | Performed by: NURSE PRACTITIONER

## 2023-11-29 PROCEDURE — 3008F PR BODY MASS INDEX (BMI) DOCUMENTED: ICD-10-PCS | Mod: CPTII,S$GLB,, | Performed by: NURSE PRACTITIONER

## 2023-11-29 PROCEDURE — 3074F SYST BP LT 130 MM HG: CPT | Mod: CPTII,S$GLB,, | Performed by: NURSE PRACTITIONER

## 2023-11-29 PROCEDURE — 1159F PR MEDICATION LIST DOCUMENTED IN MEDICAL RECORD: ICD-10-PCS | Mod: CPTII,S$GLB,, | Performed by: NURSE PRACTITIONER

## 2023-11-29 PROCEDURE — 99999 PR PBB SHADOW E&M-EST. PATIENT-LVL IV: CPT | Mod: PBBFAC,,, | Performed by: NURSE PRACTITIONER

## 2023-11-29 PROCEDURE — 99204 OFFICE O/P NEW MOD 45 MIN: CPT | Mod: S$GLB,,, | Performed by: NURSE PRACTITIONER

## 2023-11-29 PROCEDURE — 1160F RVW MEDS BY RX/DR IN RCRD: CPT | Mod: CPTII,S$GLB,, | Performed by: NURSE PRACTITIONER

## 2023-11-29 PROCEDURE — 1159F MED LIST DOCD IN RCRD: CPT | Mod: CPTII,S$GLB,, | Performed by: NURSE PRACTITIONER

## 2023-11-29 PROCEDURE — 3074F PR MOST RECENT SYSTOLIC BLOOD PRESSURE < 130 MM HG: ICD-10-PCS | Mod: CPTII,S$GLB,, | Performed by: NURSE PRACTITIONER

## 2023-11-29 PROCEDURE — 3008F BODY MASS INDEX DOCD: CPT | Mod: CPTII,S$GLB,, | Performed by: NURSE PRACTITIONER

## 2023-11-29 PROCEDURE — 4010F PR ACE/ARB THEARPY RXD/TAKEN: ICD-10-PCS | Mod: CPTII,S$GLB,, | Performed by: NURSE PRACTITIONER

## 2023-11-29 PROCEDURE — 1160F PR REVIEW ALL MEDS BY PRESCRIBER/CLIN PHARMACIST DOCUMENTED: ICD-10-PCS | Mod: CPTII,S$GLB,, | Performed by: NURSE PRACTITIONER

## 2023-11-29 PROCEDURE — 3078F PR MOST RECENT DIASTOLIC BLOOD PRESSURE < 80 MM HG: ICD-10-PCS | Mod: CPTII,S$GLB,, | Performed by: NURSE PRACTITIONER

## 2023-11-29 PROCEDURE — 99999 PR PBB SHADOW E&M-EST. PATIENT-LVL IV: ICD-10-PCS | Mod: PBBFAC,,, | Performed by: NURSE PRACTITIONER

## 2023-11-29 PROCEDURE — 4010F ACE/ARB THERAPY RXD/TAKEN: CPT | Mod: CPTII,S$GLB,, | Performed by: NURSE PRACTITIONER

## 2023-11-29 PROCEDURE — 3078F DIAST BP <80 MM HG: CPT | Mod: CPTII,S$GLB,, | Performed by: NURSE PRACTITIONER

## 2023-11-29 NOTE — PATIENT INSTRUCTIONS
Daily fiber supplement like citrucel.  Water intake!  If still having infrequent stools with fiber, can add in Miralax every other day.   Pelvic Floor PT will reach out to you to schedule. If you do not hear from them in the next few day, or if you would like to contact them to schedule the number is [phone]. Let them know you live in [place] to schedule you closer to home.   If no improvement, message/call for appt with MD Williamson or Torrie to discuss sacral nerve stimulator

## 2023-11-29 NOTE — PROGRESS NOTES
CRS Office Visit History and Physical    Referring Md:   Self, Aaareferral  No address on file    SUBJECTIVE:     Chief Complaint: fecal incontinence    History of Present Illness:  The patient is new patient to this practice.   Course is as follows:  Patient is a 58 y.o. female presents with fecal incontinence.  +full bowel movement. Always of mushy/loose stools.  Occurs q1-2 months. Otherwise has normal formed stools. Able to control this.  Continent of gas. Denies urinary incontinence.     Symptoms have been present for 2 yrs.  Reports only drinks cokes. Only eats 1 meal per day.  Bm very inconsistent. Some weeks will go 3-4x/week. Other times only 1x/week.     Associated bleeding: no  Previous anorectal procedures: No  denies straining/prolonged time on toilet with bowel movements.  is not currently taking fiber supplement or stool softener  Blood thinners: No    Last Colonoscopy: 11/10/22  - Three benign appearing colon polyps, removed   - repeat in 5 years      Review of patient's allergies indicates:  No Known Allergies    Past Medical History:   Diagnosis Date    Carpal tunnel syndrome     both arms    Concussion 2019    With bleeding on the brain    Depression     Fibromyalgia     Hyperlipemia     Hypertension     Mental disorder     depression    Sleep apnea     Subarachnoid hemorrhage      Past Surgical History:   Procedure Laterality Date    CARPAL TUNNEL RELEASE      Bilateral     SECTION      x3    COLONOSCOPY N/A 2019    Procedure: COLONOSCOPY;  Surgeon: Twan Williamson MD;  Location: [facility];  Service: Colon and Rectal;  Laterality: N/A;    COLONOSCOPY N/A 11/10/2022    Procedure: COLONOSCOPY;  Surgeon: Twan Williamson MD;  Location: [facility];  Service: Endoscopy;  Laterality: N/A;    HYSTERECTOMY  approx 40 y/o    BHUMI BSO-pain    NASAL SINUS SURGERY       Family History   Problem Relation Age of Onset    Hypertension Father     Diabetes Father     Coronary artery disease Father      Hypertension Mother     Breast cancer Mother 72    Colon cancer Neg Hx     Ovarian cancer Neg Hx      Social History     Tobacco Use    Smoking status: Every Day     Current packs/day: 1.00     Average packs/day: 1 pack/day for 30.0 years (30.0 total pack years)     Types: Cigarettes    Smokeless tobacco: Never   Substance Use Topics    Alcohol use: No     Comment: No    Drug use: Yes     Types: Marijuana     Comment: medical use; anxiety and pain control        Review of Systems:  Review of Systems   Gastrointestinal:  Positive for constipation and heartburn.        Fecal incontinence       OBJECTIVE:     Vital Signs (Most Recent)  Blood Pressure 112/78   Pulse 65   Weight 60.5 kg (133 lb 6.1 oz)   Body Mass Index 24.40 kg/m²     Physical Exam:  General: White female in no distress   Neuro: Alert and oriented to person, place, and time.  Moves all extremities.     HEENT: No icterus.  Trachea midline  Respiratory: Respirations are even and unlabored, no cough or audible wheezing  Skin: Warm dry and intact, No visible rashes, no jaundice    Labs reviewed today:  Lab Results   Component Value Date    WBC 13.19 (H) 06/26/2019    HGB 14.0 06/26/2019    HCT 42.9 06/26/2019     06/26/2019    ALT 15 06/26/2019    AST 20 06/26/2019     06/26/2019    K 3.2 (L) 06/26/2019     06/26/2019    CREATININE 0.7 06/26/2019    BUN 11 06/26/2019    CO2 26 06/26/2019    TSH 1.297 06/26/2019       Imaging reviewed today:  6/8/23 MRI lumbar spine  - No significant spinal canal or foraminal narrowing throughout.    - Mild disc changes are present within the lower lumbar spine progressed since the prior exam.     Endoscopy reviewed today:  Last Colonoscopy: 11/10/22  - Three benign appearing colon polyps, removed   - repeat in 5 years    Anorectal Exam:    Anal Skin: Normal    Digital Rectal Exam:  Resting Tone high  Squeeze normal  Relaxation with bear down absent  Mass none  Rectocele  present, small  Tenderness   absent    Anoscopy:  deferred      ASSESSMENT/PLAN:     Diagnoses and all orders for this visit:    Incontinence of feces, unspecified fecal incontinence type  -     Ambulatory referral/consult to Physical/Occupational Therapy; Future    Chronic constipation with overflow incontinence        The patient was instructed to:  Increase water intake to at least 8-10 glasses of water per day.  Take a daily fiber supplement (Konsyl, Benefiber, Metamucil) and increase dietary intake to 20-30 grams/day.  Miralax prn.  Avoid straining or spending >5min/event with bowel movements.  Pelvic floor pt  Follow-up in clinic with Dr. Brownlee or Clay if no improvement      Ayesha Schmitz, SCHUYLERP-C  Colon and Rectal Surgery

## 2024-07-16 ENCOUNTER — HOSPITAL ENCOUNTER (OUTPATIENT)
Dept: RADIOLOGY | Facility: HOSPITAL | Age: 59
Discharge: HOME | End: 2024-07-16
Attending: FAMILY MEDICINE
Payer: COMMERCIAL

## 2024-07-16 DIAGNOSIS — M25.561 PAIN IN RIGHT KNEE: ICD-10-CM

## 2024-07-16 PROCEDURE — 73721 MRI JNT OF LWR EXTRE W/O DYE: CPT | Mod: 26,RT,, | Performed by: RADIOLOGY

## 2024-07-16 PROCEDURE — 73721 MRI JNT OF LWR EXTRE W/O DYE: CPT | Mod: TC,RT

## 2024-10-03 ENCOUNTER — NURSE TRIAGE (OUTPATIENT)
Dept: ADMINISTRATIVE | Facility: CLINIC | Age: 59
End: 2024-10-03
Payer: COMMERCIAL

## 2024-10-03 NOTE — TELEPHONE ENCOUNTER
Pt calling with c/o knee swelling and she said that she had surgery in [place] on her knee and that the Dr D/c'd her 2 weeks after surgery. Pt said that tomorrow is her last day of therapy and that her knee is still painful and swollen. Pt triaged and care advice to be seen on office today. Pt told that I can get appt today but would need to go to  for appt and she is requesting Dr Johns as he was referred and highly recommended by friends. Pt would like 2nd opinion and I told her that I can route the message and the office would have to reach out with a date for eval. Pt said ok and will go to  today                       Reason for Disposition   Very swollen knee joint    Additional Information   Negative: Sounds like a life-threatening emergency to the triager   Negative: Swollen knee joint and fever   Negative: Patient sounds very sick or weak to the triager   Negative: Can't move swollen joint at all   Negative: Thigh or calf pain and only 1 side and present > 1 hour   Negative: Thigh or calf swelling and only 1 side   Negative: SEVERE pain (e.g., excruciating, unable to walk)    Protocols used: Knee Pain-A-OH

## 2024-10-07 DIAGNOSIS — M25.561 ACUTE PAIN OF RIGHT KNEE: Primary | ICD-10-CM

## 2024-10-08 ENCOUNTER — HOSPITAL ENCOUNTER (OUTPATIENT)
Dept: RADIOLOGY | Facility: HOSPITAL | Age: 59
Discharge: HOME | End: 2024-10-08
Attending: ORTHOPAEDIC SURGERY
Payer: COMMERCIAL

## 2024-10-08 ENCOUNTER — OFFICE VISIT (OUTPATIENT)
Dept: ORTHOPEDICS | Facility: CLINIC | Age: 59
End: 2024-10-08
Payer: COMMERCIAL

## 2024-10-08 VITALS — BODY MASS INDEX: 24.54 KG/M2 | HEIGHT: 62 IN | WEIGHT: 133.38 LBS

## 2024-10-08 DIAGNOSIS — M25.561 ACUTE PAIN OF RIGHT KNEE: ICD-10-CM

## 2024-10-08 DIAGNOSIS — Z98.890 STATUS POST ARTHROSCOPY OF KNEE: ICD-10-CM

## 2024-10-08 DIAGNOSIS — M17.11 PRIMARY OSTEOARTHRITIS OF RIGHT KNEE: ICD-10-CM

## 2024-10-08 DIAGNOSIS — M62.81 QUADRICEPS WEAKNESS: Primary | ICD-10-CM

## 2024-10-08 PROCEDURE — 73562 X-RAY EXAM OF KNEE 3: CPT | Mod: TC,PN,RT

## 2024-10-08 PROCEDURE — 3008F BODY MASS INDEX DOCD: CPT | Mod: CPTII,S$GLB,, | Performed by: ORTHOPAEDIC SURGERY

## 2024-10-08 PROCEDURE — 1159F MED LIST DOCD IN RCRD: CPT | Mod: CPTII,S$GLB,, | Performed by: ORTHOPAEDIC SURGERY

## 2024-10-08 PROCEDURE — 4010F ACE/ARB THERAPY RXD/TAKEN: CPT | Mod: CPTII,S$GLB,, | Performed by: ORTHOPAEDIC SURGERY

## 2024-10-08 PROCEDURE — 99204 OFFICE O/P NEW MOD 45 MIN: CPT | Mod: 25,S$GLB,, | Performed by: ORTHOPAEDIC SURGERY

## 2024-10-08 PROCEDURE — 73562 X-RAY EXAM OF KNEE 3: CPT | Mod: 26,RT,, | Performed by: RADIOLOGY

## 2024-10-08 PROCEDURE — 99999 PR PBB SHADOW E&M-EST. PATIENT-LVL IV: CPT | Mod: PBBFAC,,, | Performed by: ORTHOPAEDIC SURGERY

## 2024-10-08 PROCEDURE — 20610 DRAIN/INJ JOINT/BURSA W/O US: CPT | Mod: RT,S$GLB,, | Performed by: ORTHOPAEDIC SURGERY

## 2024-10-08 RX ORDER — BUPIVACAINE HYDROCHLORIDE 5 MG/ML
5 INJECTION, SOLUTION PERINEURAL
Status: DISCONTINUED | OUTPATIENT
Start: 2024-10-08 | End: 2024-10-08 | Stop reason: HOSPADM

## 2024-10-08 RX ORDER — KETOROLAC TROMETHAMINE 30 MG/ML
30 INJECTION, SOLUTION INTRAMUSCULAR; INTRAVENOUS
Status: DISCONTINUED | OUTPATIENT
Start: 2024-10-08 | End: 2024-10-08 | Stop reason: HOSPADM

## 2024-10-08 RX ORDER — LIDOCAINE HYDROCHLORIDE 10 MG/ML
4 INJECTION, SOLUTION INFILTRATION; PERINEURAL
Status: DISCONTINUED | OUTPATIENT
Start: 2024-10-08 | End: 2024-10-08 | Stop reason: HOSPADM

## 2024-10-08 RX ADMIN — BUPIVACAINE HYDROCHLORIDE 5 ML: 5 INJECTION, SOLUTION PERINEURAL at 10:10

## 2024-10-08 RX ADMIN — KETOROLAC TROMETHAMINE 30 MG: 30 INJECTION, SOLUTION INTRAMUSCULAR; INTRAVENOUS at 10:10

## 2024-10-08 RX ADMIN — LIDOCAINE HYDROCHLORIDE 4 ML: 10 INJECTION, SOLUTION INFILTRATION; PERINEURAL at 10:10

## 2024-10-08 NOTE — PROGRESS NOTES
[facility] [address]          Subjective:     Patient ID: Sakina Barrios is a 59 y.o. female.    Chief Complaint: Pain of the Right Knee       Patient ID: Sakina Barrios is a 59 y.o. female.    Chief Complaint: Pain of the Right Knee      KNEE PAIN: Complains of pain to the rightknee.   PAIN LOCATED: diffuse  ONSET: 8 months ago.  QUALITY:  Patient states the pain is stable  HISTORY: Previous knee injury/surgery: Yes  Hx:   Reports twisting injury to right knee in February which showed small meniscus tear on MRI for which she underwent knee arthroscopy 2 months ago by Dr. Matta, reports undergoing meniscectomy and plica removal, reports pain slightly improved after surgery but has continued to have knee swelling and pain since then, underwent 7 weeks of therapy after scope  ASSOCIATED SYMPTOM AND TRIGGERS:Standing/Weightbearing, walking, trouble w stairs, stiffness, swelling, limping, stiffness w sitting   Has tried and failed cardiovascular activities such as walking, biking and resistance exercises  USES ASSISTIVE DEVICE: none  RELIEVED BY:rest, medication: THC gummies used but not effective  PATIENT DENIES: bruising, redness, deformity        Past Medical History:   Diagnosis Date    Carpal tunnel syndrome     both arms    Concussion 2019    With bleeding on the brain    Depression     Fibromyalgia     Hyperlipemia     Hypertension     Mental disorder     depression    Sleep apnea     Subarachnoid hemorrhage         Past Surgical History:   Procedure Laterality Date    CARPAL TUNNEL RELEASE      Bilateral     SECTION      x3    COLONOSCOPY N/A 2019    Procedure: COLONOSCOPY;  Surgeon: Twan Williamson MD;  Location: [facility];  Service: Colon and Rectal;  Laterality: N/A;    COLONOSCOPY N/A 11/10/2022    Procedure: COLONOSCOPY;  Surgeon: Twan Williamson MD;  Location: [facility];  Service: Endoscopy;  Laterality: N/A;    HYSTERECTOMY  approx 40 y/o    BHUMI BSO-pain    NASAL SINUS SURGERY           Current Outpatient Medications   Medication Instructions    albuterol (PROVENTIL/VENTOLIN HFA) 90 mcg/actuation inhaler Inhalation, As needed (PRN)    ALPRAZolam (XANAX) 0.5 mg    aspirin (ECOTRIN) 81 mg, Daily    atenoloL (TENORMIN) 25 mg, Oral, Daily    chlorzoxazone (PARAFON FORTE) 500 mg, 3 times daily PRN    DULoxetine (CYMBALTA) 60 mg, Every morning    fluticasone propionate (FLONASE) 50 mcg/actuation nasal spray 1 spray, Each Nostril    hydroCHLOROthiazide (MICROZIDE) 12.5 mg    losartan (COZAAR) 25 mg    omeprazole (PRILOSEC) 40 MG capsule 1 capsule, Daily    ondansetron (ZOFRAN) 8 mg, Daily PRN    potassium chloride SA (K-DUR,KLOR-CON) 10 MEQ tablet 20 mEq, 2 times daily    QUEtiapine (SEROQUEL) 200 mg, Nightly    rosuvastatin (CRESTOR) 40 mg    scopolamine (TRANSDERM-SCOP) 1.3-1.5 mg (1 mg over 3 days) 1 patch, Transdermal, As needed (PRN)    traMADoL (ULTRAM) 50 mg, Oral, 2 times daily PRN    UNABLE TO FIND THC - flower and drops- qid prn        Review of patient's allergies indicates:  No Known Allergies    Social History     Socioeconomic History    Marital status:    Tobacco Use    Smoking status: Every Day     Current packs/day: 1.00     Average packs/day: 1 pack/day for 30.0 years (30.0 ttl pk-yrs)     Types: Cigarettes    Smokeless tobacco: Never   Substance and Sexual Activity    Alcohol use: No     Comment: No    Drug use: Yes     Types: Marijuana     Comment: medical use; anxiety and pain control    Sexual activity: Not Currently     Partners: Male     Birth control/protection: Surgical, See Surgical Hx     Comment: .       Family History   Problem Relation Name Age of Onset    Hypertension Father      Diabetes Father      Coronary artery disease Father      Hypertension Mother      Breast cancer Mother  72    Colon cancer Neg Hx      Ovarian cancer Neg Hx           Review of systems negative except for noted in HPI    Objective:   Physical Exam:     right knee  Skin  atraumatic   Moderate effusion  Tender to palpation over medial joint line, tender to palpation lateral joint line  ROM 0-115  Stable anterior/posterior   Stable varus/valgus  Quad strength 4/5  No groin pain with rotation of hip  Grossly NVI distally    Imaging:   X-Ray knee reviewed, KL 2 changes with joint space narrowing, sclerosis, and osteophytosis.  MRI right knee from 07/15/2024 reveals small tear to posterior horn of medial meniscus, no bone marrow edema      Assessment:        Sakina Barrios is a 59 y.o. female    Encounter Diagnoses   Name Primary?    Quadriceps weakness Yes    Primary osteoarthritis of right knee     Status post arthroscopy of knee        Plan :     We had a lengthy discussion regarding the natural history of osteoarthritis.   The patient would like to continue nonoperative management, and I think that is Reasonable. The patient has mild/moderate knee oa. KL score 2  We went ahead and injected the right  with 1 dose of ketorolac, Marcaine, and lidocaine. We reviewed the risks (incl side effects and allergies), benefits, of all treatment options including injections.   We also discussed HA injections but we will need prior approval from insurance  Patient given knee brace in clinic today  New referral given for physical therapy  We also did begin discussing total knee arthroplasty. The patient was given educational literature regarding knee OA and total knee arthroplasty.   We will see the patient back in the next 2 weeks for a HA injection          Orders Placed This Encounter   Procedures    Large Joint Aspiration/Injection: R knee    Ambulatory referral/consult to Physical/Occupational Therapy    Prior authorization Order              Kobe Wright MD  [facility] PGY-3

## 2024-10-08 NOTE — PROCEDURES
Large Joint Aspiration/Injection: R knee    Date/Time: 10/8/2024 10:45 AM    Performed by: Leonard Johns MD  Authorized by: Leonard Johns MD    Consent Done?:  Yes (Verbal)  Indications:  Arthritis  Site marked: the procedure site was marked    Timeout: prior to procedure the correct patient, procedure, and site was verified    Prep: patient was prepped and draped in usual sterile fashion      Local anesthesia used?: Yes    Local anesthetic:  Topical anesthetic    Details:  Needle Size:  22 G  Ultrasonic Guidance for needle placement?: No    Approach:  Anterolateral  Location:  Knee  Site:  R knee  Medications:  30 mg ketorolac 30 mg/mL (1 mL); 5 mL BUPivacaine 0.5 % (5 mg/mL); 4 mL LIDOcaine HCL 10 mg/ml (1%) 10 mg/mL (1 %)  Patient tolerance:  Patient tolerated the procedure well with no immediate complications

## 2024-10-16 ENCOUNTER — OFFICE VISIT (OUTPATIENT)
Dept: ORTHOPEDICS | Facility: CLINIC | Age: 59
End: 2024-10-16
Payer: COMMERCIAL

## 2024-10-16 VITALS — HEIGHT: 62 IN | WEIGHT: 141.13 LBS | BODY MASS INDEX: 25.97 KG/M2

## 2024-10-16 DIAGNOSIS — Z98.890 STATUS POST ARTHROSCOPY OF KNEE: ICD-10-CM

## 2024-10-16 DIAGNOSIS — M62.81 QUADRICEPS WEAKNESS: ICD-10-CM

## 2024-10-16 DIAGNOSIS — M17.11 PRIMARY OSTEOARTHRITIS OF RIGHT KNEE: Primary | ICD-10-CM

## 2024-10-16 PROCEDURE — 99999 PR PBB SHADOW E&M-EST. PATIENT-LVL IV: CPT | Mod: PBBFAC,,, | Performed by: PHYSICIAN ASSISTANT

## 2024-10-16 PROCEDURE — 99499 UNLISTED E&M SERVICE: CPT | Mod: 25,S$GLB,, | Performed by: PHYSICIAN ASSISTANT

## 2024-10-16 PROCEDURE — 20610 DRAIN/INJ JOINT/BURSA W/O US: CPT | Mod: RT,S$GLB,, | Performed by: PHYSICIAN ASSISTANT

## 2024-10-16 NOTE — PROGRESS NOTES
Subjective:      Chief Complaint: Pain of the Right Knee and Injections (R Gelsyn #1)    Patient ID: Sakina Barrios is a 59 y.o. female.  Patient is here for First Gelsyn3 injection(s) for right knee osteoarthritis.  No new complaints today.          Past Medical History:   Diagnosis Date    Carpal tunnel syndrome     both arms    Concussion 2019    With bleeding on the brain    Depression     Fibromyalgia     Hyperlipemia     Hypertension     Mental disorder     depression    Sleep apnea     Subarachnoid hemorrhage         Past Surgical History:   Procedure Laterality Date    CARPAL TUNNEL RELEASE      Bilateral     SECTION      x3    COLONOSCOPY N/A 2019    Procedure: COLONOSCOPY;  Surgeon: Twan Williamson MD;  Location: [facility];  Service: Colon and Rectal;  Laterality: N/A;    COLONOSCOPY N/A 11/10/2022    Procedure: COLONOSCOPY;  Surgeon: Twan Williamson MD;  Location: [facility];  Service: Endoscopy;  Laterality: N/A;    HYSTERECTOMY  approx 42 y/o    BHUMI BSO-pain    NASAL SINUS SURGERY          Current Outpatient Medications   Medication Instructions    albuterol (PROVENTIL/VENTOLIN HFA) 90 mcg/actuation inhaler Inhalation, As needed (PRN)    ALPRAZolam (XANAX) 0.5 mg    aspirin (ECOTRIN) 81 mg, Daily    atenoloL (TENORMIN) 25 mg, Oral, Daily    chlorzoxazone (PARAFON FORTE) 500 mg, 3 times daily PRN    DULoxetine (CYMBALTA) 60 mg, Every morning    fluticasone propionate (FLONASE) 50 mcg/actuation nasal spray 1 spray, Each Nostril    hydroCHLOROthiazide (MICROZIDE) 12.5 mg    losartan (COZAAR) 25 mg    omeprazole (PRILOSEC) 40 MG capsule 1 capsule, Daily    ondansetron (ZOFRAN) 8 mg, Daily PRN    potassium chloride SA (K-DUR,KLOR-CON) 10 MEQ tablet 20 mEq, 2 times daily    QUEtiapine (SEROQUEL) 200 mg, Nightly    rosuvastatin (CRESTOR) 40 mg    scopolamine (TRANSDERM-SCOP) 1.3-1.5 mg (1 mg over 3 days) 1 patch, Transdermal, As needed (PRN)    traMADoL (ULTRAM) 50 mg, Oral, 2 times daily PRN     "UNABLE TO FIND THC - flower and drops- qid prn        Review of patient's allergies indicates:  No Known Allergies    Review of Systems   Constitutional: Negative for fever and malaise/fatigue.   Eyes:  Negative for blurred vision.   Cardiovascular:  Negative for chest pain.   Respiratory:  Negative for shortness of breath.    Skin:  Negative for poor wound healing.   Musculoskeletal:  Positive for joint pain and myalgias.   Genitourinary:  Negative for bladder incontinence.   Neurological:  Negative for dizziness, numbness and paresthesias.   Psychiatric/Behavioral:  Negative for altered mental status.        The patient's relevant past medical, surgical, and social history was reviewed in Epic.       Objective:      VITAL SIGNS: Ht 5' 2" (1.575 m)   Wt 64 kg (141 lb 1.5 oz)   BMI 25.81 kg/m²     Ortho/SPM Exam     Imaging          Assessment:       Sakina Barrios is a 59 y.o. female seen in the office today for   1. Primary osteoarthritis of right knee    2. Quadriceps weakness    3. Status post arthroscopy of knee           Plan:       Sakina was seen today for pain and injections.    Diagnoses and all orders for this visit:    Primary osteoarthritis of right knee  -     Ambulatory referral/consult to Physical/Occupational Therapy; Future  -     Large Joint Aspiration/Injection: R knee    Quadriceps weakness  -     Ambulatory referral/consult to Physical/Occupational Therapy; Future    Status post arthroscopy of knee  -     Ambulatory referral/consult to Physical/Occupational Therapy; Future  -     Large Joint Aspiration/Injection: R knee      I injected the right knee with one dose of Gelsyn3 today.  We will see the patient back next week or as needed.   New external order for PT ordered today.     Diagnoses and plan discussed with the patient, as well as the expected course and duration of his symptoms.  All questions and concerns were addressed prior to the end of the visit.   Instructed patient to call " office if they have any future questions/concerns or to schedule apt. Patient will return to see me if symptoms worsen or fail to improve    Note dictated with voice recognition software, please excuse any grammatical errors.        Cherelle Mascorro PA-C      Department of Orthopedic Surgery  [facility]  Office: [phone]  10/16/2024

## 2024-10-16 NOTE — PROCEDURES
Large Joint Aspiration/Injection: R knee    Date/Time: 10/16/2024 10:15 AM    Performed by: Cherelle Mascorro PA-C  Authorized by: Cherelle Mascorro PA-C    Consent Done?:  Yes (Verbal)  Indications:  Pain  Site marked: the procedure site was marked    Timeout: prior to procedure the correct patient, procedure, and site was verified    Prep: patient was prepped and draped in usual sterile fashion    Local anesthesia used?: No    Local anesthetic:  Topical anesthetic    Details:  Needle Size:  22 G  Ultrasonic Guidance for needle placement?: No    Approach:  Anterolateral  Location:  Knee  Site:  R knee  Medications:  16.8 mg sodium hyaluronate 16.8 mg/2 mL  Patient tolerance:  Patient tolerated the procedure well with no immediate complications

## 2024-10-23 ENCOUNTER — OFFICE VISIT (OUTPATIENT)
Dept: ORTHOPEDICS | Facility: CLINIC | Age: 59
End: 2024-10-23
Payer: COMMERCIAL

## 2024-10-23 VITALS — HEIGHT: 62 IN | WEIGHT: 138.44 LBS | BODY MASS INDEX: 25.48 KG/M2

## 2024-10-23 DIAGNOSIS — G89.29 CHRONIC BILATERAL LOW BACK PAIN WITH BILATERAL SCIATICA: ICD-10-CM

## 2024-10-23 DIAGNOSIS — M17.11 PRIMARY OSTEOARTHRITIS OF RIGHT KNEE: ICD-10-CM

## 2024-10-23 DIAGNOSIS — M54.31 RIGHT SIDED SCIATICA: Primary | ICD-10-CM

## 2024-10-23 DIAGNOSIS — M54.42 CHRONIC BILATERAL LOW BACK PAIN WITH BILATERAL SCIATICA: ICD-10-CM

## 2024-10-23 DIAGNOSIS — M54.41 CHRONIC BILATERAL LOW BACK PAIN WITH BILATERAL SCIATICA: ICD-10-CM

## 2024-10-23 PROCEDURE — 99999 PR PBB SHADOW E&M-EST. PATIENT-LVL IV: CPT | Mod: PBBFAC,,, | Performed by: PHYSICIAN ASSISTANT

## 2024-10-23 PROCEDURE — 20610 DRAIN/INJ JOINT/BURSA W/O US: CPT | Mod: RT,S$GLB,, | Performed by: PHYSICIAN ASSISTANT

## 2024-10-23 PROCEDURE — 99499 UNLISTED E&M SERVICE: CPT | Mod: S$GLB,,, | Performed by: PHYSICIAN ASSISTANT

## 2024-10-23 NOTE — PROCEDURES
Large Joint Aspiration/Injection: R knee    Date/Time: 10/23/2024 9:30 AM    Performed by: Cherelle Mascorro PA-C  Authorized by: Cherelle Mascorro PA-C    Consent Done?:  Yes (Verbal)  Indications:  Pain  Site marked: the procedure site was marked    Timeout: prior to procedure the correct patient, procedure, and site was verified    Prep: patient was prepped and draped in usual sterile fashion    Local anesthesia used?: No    Local anesthetic:  Topical anesthetic    Details:  Needle Size:  22 G  Ultrasonic Guidance for needle placement?: No    Approach:  Anterolateral  Location:  Knee  Site:  R knee  Medications:  16.8 mg sodium hyaluronate 16.8 mg/2 mL  Patient tolerance:  Patient tolerated the procedure well with no immediate complications

## 2024-10-23 NOTE — PROGRESS NOTES
Subjective:      Chief Complaint: Pain of the Right Knee and Injections (R Gelsyn #2)    Patient ID: Sakina Barrios is a 59 y.o. female.  Patient is here for Second Gelsyn3 injection(s) for right knee osteoarthritis. Patient tolerated last injection well. C/O right sided sciatica also. Is in PT for her right knee.         Past Medical History:   Diagnosis Date    Carpal tunnel syndrome     both arms    Concussion 2019    With bleeding on the brain    Depression     Fibromyalgia     Hyperlipemia     Hypertension     Mental disorder     depression    Sleep apnea     Subarachnoid hemorrhage         Past Surgical History:   Procedure Laterality Date    CARPAL TUNNEL RELEASE      Bilateral     SECTION      x3    COLONOSCOPY N/A 2019    Procedure: COLONOSCOPY;  Surgeon: Twan Williamson MD;  Location: [facility];  Service: Colon and Rectal;  Laterality: N/A;    COLONOSCOPY N/A 11/10/2022    Procedure: COLONOSCOPY;  Surgeon: Twan Williamson MD;  Location: [facility];  Service: Endoscopy;  Laterality: N/A;    HYSTERECTOMY  approx 40 y/o    BHUMI BSO-pain    NASAL SINUS SURGERY          Current Outpatient Medications   Medication Instructions    albuterol (PROVENTIL/VENTOLIN HFA) 90 mcg/actuation inhaler Inhalation, As needed (PRN)    ALPRAZolam (XANAX) 0.5 mg    aspirin (ECOTRIN) 81 mg, Daily    atenoloL (TENORMIN) 25 mg, Oral, Daily    chlorzoxazone (PARAFON FORTE) 500 mg, 3 times daily PRN    DULoxetine (CYMBALTA) 60 mg, Every morning    fluticasone propionate (FLONASE) 50 mcg/actuation nasal spray 1 spray, Each Nostril    hydroCHLOROthiazide (MICROZIDE) 12.5 mg    losartan (COZAAR) 25 mg    omeprazole (PRILOSEC) 40 MG capsule 1 capsule, Daily    ondansetron (ZOFRAN) 8 mg, Daily PRN    potassium chloride SA (K-DUR,KLOR-CON) 10 MEQ tablet 20 mEq, 2 times daily    QUEtiapine (SEROQUEL) 200 mg, Nightly    rosuvastatin (CRESTOR) 40 mg    scopolamine (TRANSDERM-SCOP) 1.3-1.5 mg (1 mg over 3 days) 1 patch,  "Transdermal, As needed (PRN)    traMADoL (ULTRAM) 50 mg, Oral, 2 times daily PRN    UNABLE TO FIND THC - flower and drops- qid prn        Review of patient's allergies indicates:  No Known Allergies    Review of Systems   Constitutional: Negative for fever and malaise/fatigue.   Eyes:  Negative for blurred vision.   Cardiovascular:  Negative for chest pain.   Respiratory:  Negative for shortness of breath.    Skin:  Negative for poor wound healing.   Musculoskeletal:  Positive for joint pain and myalgias.   Genitourinary:  Negative for bladder incontinence.   Neurological:  Negative for dizziness, numbness and paresthesias.   Psychiatric/Behavioral:  Negative for altered mental status.        The patient's relevant past medical, surgical, and social history was reviewed in Epic.       Objective:      VITAL SIGNS: Ht 5' 2" (1.575 m)   Wt 62.8 kg (138 lb 7.2 oz)   BMI 25.32 kg/m²     Ortho/SPM Exam     Imaging          Assessment:       Sakina Barrios is a 59 y.o. female seen in the office today for   1. Right sided sciatica    2. Chronic bilateral low back pain with bilateral sciatica    3. Primary osteoarthritis of right knee           Plan:       Sakina was seen today for pain and injections.    Diagnoses and all orders for this visit:    Right sided sciatica  -     Ambulatory referral/consult to Physical/Occupational Therapy; Future  -     Ambulatory referral/consult to Pain Clinic; Future    Chronic bilateral low back pain with bilateral sciatica  -     Ambulatory referral/consult to Pain Clinic; Future    Primary osteoarthritis of right knee  -     Large Joint Aspiration/Injection: R knee      I injected the right knee with one dose of Gelsyn3 today.  We will see the patient back next week or as needed.   Will add right sided sciatica to her physical therapy  Referral to pain management for low back pain/sciatica     Diagnoses and plan discussed with the patient, as well as the expected course and duration of " his symptoms.  All questions and concerns were addressed prior to the end of the visit.   Instructed patient to call office if they have any future questions/concerns or to schedule apt. Patient will return to see me if symptoms worsen or fail to improve    Note dictated with voice recognition software, please excuse any grammatical errors.        Cherelle Mascorro PA-C      Department of Orthopedic Surgery  [facility]  Office: [phone]  10/23/2024

## 2024-10-30 ENCOUNTER — OFFICE VISIT (OUTPATIENT)
Dept: ORTHOPEDICS | Facility: CLINIC | Age: 59
End: 2024-10-30
Payer: COMMERCIAL

## 2024-10-30 VITALS — BODY MASS INDEX: 25.68 KG/M2 | WEIGHT: 139.56 LBS | HEIGHT: 62 IN

## 2024-10-30 DIAGNOSIS — M17.11 PRIMARY OSTEOARTHRITIS OF RIGHT KNEE: Primary | ICD-10-CM

## 2024-10-30 PROCEDURE — 20610 DRAIN/INJ JOINT/BURSA W/O US: CPT | Mod: RT,S$GLB,, | Performed by: PHYSICIAN ASSISTANT

## 2024-10-30 PROCEDURE — 99499 UNLISTED E&M SERVICE: CPT | Mod: S$GLB,,, | Performed by: PHYSICIAN ASSISTANT

## 2024-10-30 PROCEDURE — 99999 PR PBB SHADOW E&M-EST. PATIENT-LVL III: CPT | Mod: PBBFAC,,, | Performed by: PHYSICIAN ASSISTANT

## 2024-11-01 ENCOUNTER — OFFICE VISIT (OUTPATIENT)
Dept: PAIN MEDICINE | Facility: CLINIC | Age: 59
End: 2024-11-01
Payer: COMMERCIAL

## 2024-11-01 VITALS — WEIGHT: 137.25 LBS | BODY MASS INDEX: 25.26 KG/M2 | HEIGHT: 62 IN

## 2024-11-01 DIAGNOSIS — G89.29 CHRONIC BILATERAL LOW BACK PAIN WITH BILATERAL SCIATICA: ICD-10-CM

## 2024-11-01 DIAGNOSIS — M54.42 CHRONIC BILATERAL LOW BACK PAIN WITH BILATERAL SCIATICA: ICD-10-CM

## 2024-11-01 DIAGNOSIS — M54.41 CHRONIC BILATERAL LOW BACK PAIN WITH BILATERAL SCIATICA: ICD-10-CM

## 2024-11-01 PROCEDURE — 99999 PR PBB SHADOW E&M-EST. PATIENT-LVL IV: CPT | Mod: PBBFAC,,, | Performed by: EMERGENCY MEDICINE

## 2024-12-17 ENCOUNTER — TELEPHONE (OUTPATIENT)
Dept: OBSTETRICS AND GYNECOLOGY | Facility: CLINIC | Age: 59
End: 2024-12-17
Payer: COMMERCIAL

## 2024-12-17 DIAGNOSIS — Z12.31 BREAST CANCER SCREENING BY MAMMOGRAM: Primary | ICD-10-CM

## 2024-12-17 NOTE — TELEPHONE ENCOUNTER
Mammogram orders placed and linked.    Pt was called and she desires appt due to complaints of pain and possible swelling to left breast x 2 days. Appt given with Dr. Wyatt tomorrow at 8:45 am. Pt voiced understanding.

## 2024-12-17 NOTE — TELEPHONE ENCOUNTER
----- Message from Love sent at 12/17/2024  3:52 PM CST -----  Contact: Self  Sakina Barrios  MRN: [id number]  Home Phone      [phone]  Work Phone      Not on file.  Mobile          [phone]    Patient Care Team:  Donovan Mendez PA-C as PCP - General (Family Medicine)  Kimberly Alves MD as Consulting Physician (Obstetrics and Gynecology)  OB? No  What phone number can you be reached at? [phone]  Message: needs appt for pain in left breast with swelling. Also, please link mammo orders for appt on 1/30

## 2024-12-18 ENCOUNTER — OFFICE VISIT (OUTPATIENT)
Dept: OBSTETRICS AND GYNECOLOGY | Facility: CLINIC | Age: 59
End: 2024-12-18
Payer: COMMERCIAL

## 2024-12-18 VITALS
HEIGHT: 62 IN | SYSTOLIC BLOOD PRESSURE: 110 MMHG | WEIGHT: 139.31 LBS | BODY MASS INDEX: 25.64 KG/M2 | DIASTOLIC BLOOD PRESSURE: 72 MMHG | HEART RATE: 66 BPM

## 2024-12-18 DIAGNOSIS — N64.4 BREAST TENDERNESS IN FEMALE: ICD-10-CM

## 2024-12-18 DIAGNOSIS — R07.89 CHEST WALL DISCOMFORT: Primary | ICD-10-CM

## 2024-12-18 PROCEDURE — 1159F MED LIST DOCD IN RCRD: CPT | Mod: CPTII,S$GLB,, | Performed by: OBSTETRICS & GYNECOLOGY

## 2024-12-18 PROCEDURE — 3078F DIAST BP <80 MM HG: CPT | Mod: CPTII,S$GLB,, | Performed by: OBSTETRICS & GYNECOLOGY

## 2024-12-18 PROCEDURE — 4010F ACE/ARB THERAPY RXD/TAKEN: CPT | Mod: CPTII,S$GLB,, | Performed by: OBSTETRICS & GYNECOLOGY

## 2024-12-18 PROCEDURE — 99999 PR PBB SHADOW E&M-EST. PATIENT-LVL III: CPT | Mod: PBBFAC,,, | Performed by: OBSTETRICS & GYNECOLOGY

## 2024-12-18 PROCEDURE — 99212 OFFICE O/P EST SF 10 MIN: CPT | Mod: S$GLB,,, | Performed by: OBSTETRICS & GYNECOLOGY

## 2024-12-18 PROCEDURE — 3008F BODY MASS INDEX DOCD: CPT | Mod: CPTII,S$GLB,, | Performed by: OBSTETRICS & GYNECOLOGY

## 2024-12-18 PROCEDURE — 3074F SYST BP LT 130 MM HG: CPT | Mod: CPTII,S$GLB,, | Performed by: OBSTETRICS & GYNECOLOGY

## 2024-12-18 NOTE — PROGRESS NOTES
Subjective:    Patient ID: Sakina Barrios is a 59 y.o. y.o. female.     Chief Complaint:   Chief Complaint   Patient presents with    Breast Pain     Left breast pain and swelling x 2 days       History of Present Illness:   Sakina presents for evaluation of 2 day history of left breast pain. She reports it is tender on the superior aspect of her left breast. Denies any masses, erythema or nipple discharge. No trauma. Does report increased activity with moving boxes over the past few days.        ROS:   CONSTITUTIONAL: Negative for fever, chills, diaphoresis, weakness, fatigue, weight loss, weight gain  GASTROINTESTINAL: negative for abdominal pain, flank pain, nausea, vomiting, diarrhea, constipation, black stool, blood in stool  BREAST: as above  GENITOURINARY: negative for dysuria, frequency/urgency, hematuria, genital discharge, vaginal bleeding, irregular menses, heavy menses, pelvic pain  HEMATOLOGIC/LYMPHATIC: negative for swollen lymph nodes, bleeding, bruising  MUSCULOSKELETAL: negative for back pain, joint pain, joint stiffness, joint swelling, muscle pain, muscle weakness  ENDOCRINE: negative for polydipsia/polyuria, palpitations, skin changes, temperature intolerance, unexpected weight changes      Physical Exam:   Vital Signs:  Vitals:    12/18/24 0834   BP: 110/72   Pulse: 66       General:  alert,normal appearing gravid female   Breasts:  no discharge, erythema, or tenderness, chest wall tenderness bilaterally        Assessment:      1. Chest wall discomfort    2. Breast tenderness in female          Plan:      Chest wall discomfort    Breast tenderness in female      NSAIDs

## 2025-05-12 ENCOUNTER — OFFICE VISIT (OUTPATIENT)
Dept: OBSTETRICS AND GYNECOLOGY | Facility: CLINIC | Age: 60
End: 2025-05-12
Attending: OBSTETRICS & GYNECOLOGY
Payer: COMMERCIAL

## 2025-05-12 ENCOUNTER — RESULTS FOLLOW-UP (OUTPATIENT)
Dept: OBSTETRICS AND GYNECOLOGY | Facility: CLINIC | Age: 60
End: 2025-05-12

## 2025-05-12 ENCOUNTER — HOSPITAL ENCOUNTER (OUTPATIENT)
Dept: RADIOLOGY | Facility: HOSPITAL | Age: 60
Discharge: HOME | End: 2025-05-12
Attending: OBSTETRICS & GYNECOLOGY
Payer: COMMERCIAL

## 2025-05-12 VITALS
DIASTOLIC BLOOD PRESSURE: 80 MMHG | BODY MASS INDEX: 25.79 KG/M2 | WEIGHT: 140.13 LBS | SYSTOLIC BLOOD PRESSURE: 114 MMHG | HEART RATE: 69 BPM | HEIGHT: 62 IN

## 2025-05-12 VITALS — BODY MASS INDEX: 25.58 KG/M2 | HEIGHT: 62 IN | WEIGHT: 139 LBS

## 2025-05-12 DIAGNOSIS — Z01.419 WELL WOMAN EXAM WITH ROUTINE GYNECOLOGICAL EXAM: Primary | ICD-10-CM

## 2025-05-12 DIAGNOSIS — Z12.31 BREAST CANCER SCREENING BY MAMMOGRAM: ICD-10-CM

## 2025-05-12 DIAGNOSIS — Z90.79 HISTORY OF TOTAL HYSTERECTOMY WITH BILATERAL SALPINGO-OOPHORECTOMY (BSO): ICD-10-CM

## 2025-05-12 DIAGNOSIS — Z90.722 HISTORY OF TOTAL HYSTERECTOMY WITH BILATERAL SALPINGO-OOPHORECTOMY (BSO): ICD-10-CM

## 2025-05-12 DIAGNOSIS — Z90.710 HISTORY OF TOTAL HYSTERECTOMY WITH BILATERAL SALPINGO-OOPHORECTOMY (BSO): ICD-10-CM

## 2025-05-12 DIAGNOSIS — R92.2 INCONCLUSIVE MAMMOGRAM: Primary | ICD-10-CM

## 2025-05-12 PROCEDURE — 99999 PR PBB SHADOW E&M-EST. PATIENT-LVL III: CPT | Mod: PBBFAC,,, | Performed by: OBSTETRICS & GYNECOLOGY

## 2025-05-12 PROCEDURE — 99396 PREV VISIT EST AGE 40-64: CPT | Mod: S$GLB,,, | Performed by: OBSTETRICS & GYNECOLOGY

## 2025-05-12 PROCEDURE — 77067 SCR MAMMO BI INCL CAD: CPT | Mod: 26,,, | Performed by: RADIOLOGY

## 2025-05-12 PROCEDURE — 77067 SCR MAMMO BI INCL CAD: CPT | Mod: TC

## 2025-05-12 PROCEDURE — 1160F RVW MEDS BY RX/DR IN RCRD: CPT | Mod: CPTII,S$GLB,, | Performed by: OBSTETRICS & GYNECOLOGY

## 2025-05-12 PROCEDURE — 3008F BODY MASS INDEX DOCD: CPT | Mod: CPTII,S$GLB,, | Performed by: OBSTETRICS & GYNECOLOGY

## 2025-05-12 PROCEDURE — 1159F MED LIST DOCD IN RCRD: CPT | Mod: CPTII,S$GLB,, | Performed by: OBSTETRICS & GYNECOLOGY

## 2025-05-12 PROCEDURE — 3079F DIAST BP 80-89 MM HG: CPT | Mod: CPTII,S$GLB,, | Performed by: OBSTETRICS & GYNECOLOGY

## 2025-05-12 PROCEDURE — 77063 BREAST TOMOSYNTHESIS BI: CPT | Mod: 26,,, | Performed by: RADIOLOGY

## 2025-05-12 PROCEDURE — 4010F ACE/ARB THERAPY RXD/TAKEN: CPT | Mod: CPTII,S$GLB,, | Performed by: OBSTETRICS & GYNECOLOGY

## 2025-05-12 PROCEDURE — 3074F SYST BP LT 130 MM HG: CPT | Mod: CPTII,S$GLB,, | Performed by: OBSTETRICS & GYNECOLOGY

## 2025-05-12 NOTE — PROGRESS NOTES
Subjective:    Patient ID: Sakina Barrios is a 60 y.o. y.o. female.     Chief Complaint: Annual Well Woman Exam     History of Present Illness:  Sakina presents today for Annual Well Woman exam. She describes her menses as absent, BHUMI/BSO.She denies pelvic pain.  She denies breast tenderness, masses, nipple discharge. She denies GYN complaints. She denies difficulty with urination or bowel movements. She denies menopausal symptoms such as hotflashes, vaginal dryness, and night sweats. She denies bloating, early satiety, or weight changes. She is not currently sexually active.    Menstrual History:   No LMP recorded. Patient has had a hysterectomy..     OB History          4    Para   3    Term   3            AB   1    Living   3         SAB   1    IAB        Ectopic        Multiple        Live Births   3                 The following portions of the patient's history were reviewed and updated as appropriate: allergies, current medications, past family history, past medical history, past social history, past surgical history, and problem list.    ROS:   Review of Systems   Constitutional:  Negative for chills, diaphoresis, fatigue, fever and unexpected weight change.   HENT:  Negative for congestion, hearing loss, postnasal drip, rhinorrhea, sinus pressure, sinus pain, sore throat and tinnitus.    Eyes:  Negative for pain, discharge and visual disturbance.   Respiratory:  Negative for apnea, cough, shortness of breath and wheezing.    Cardiovascular:  Negative for chest pain, palpitations and leg swelling.   Gastrointestinal:  Negative for abdominal pain, constipation, diarrhea, nausea and vomiting.   Endocrine: Negative for cold intolerance, heat intolerance, polydipsia, polyphagia and polyuria.   Genitourinary:  Negative for difficulty urinating, dyspareunia, dysuria, enuresis, flank pain, frequency, genital sores, hematuria, menstrual problem, pelvic pain, urgency, vaginal bleeding, vaginal  discharge and vaginal pain.   Musculoskeletal:  Positive for back pain. Negative for arthralgias, joint swelling, myalgias, neck pain and neck stiffness.   Skin:  Negative for color change, pallor and rash.   Allergic/Immunologic: Negative for environmental allergies, food allergies and immunocompromised state.   Neurological:  Negative for dizziness, weakness, light-headedness, numbness and headaches.   Hematological:  Negative for adenopathy. Does not bruise/bleed easily.   Psychiatric/Behavioral:  Positive for agitation. Negative for confusion. The patient is nervous/anxious.          Objective:    Vital Signs:  Vitals:    05/12/25 1019   BP: 114/80   Pulse: 69       Physical Exam:   Examination performed with Chaperone present  General:  alert, cooperative, no distress   Skin:  Skin color, texture, turgor normal. No rashes or lesions   HEENT:  extra ocular movement intact, sclera clear, anicteric   Neck: supple, trachea midline, no adenopathy or thyromegally   Respiratory:  Normal effort   Breasts:  no discharge, erythema, tenderness, or palpable masses; no axillary lymphadenopathy   Abdomen:  soft, nontender, no palpable masses   Pelvis: External genitalia: normal general appearance  Urinary system: urethral meatus normal, bladder nontender  Vaginal: normal without tenderness, induration or masses, atrophic mucosa  Cervix: removed surgically  Uterus: removed surgically  Adnexa: removed surgically   Extremities: Normal ROM; no edema, no cyanosis   Neurologial: Normal strength and tone. No focal numbness or weakness.   Psychiatric: normal mood, speech, dress, and thought processes         Assessment:       Healthy female exam.     1. Well woman exam with routine gynecological exam    2. History of total hysterectomy with bilateral salpingo-oophorectomy (BSO)          Plan:      Well woman exam with routine gynecological exam    History of total hysterectomy with bilateral salpingo-oophorectomy (BSO)        MMG  pending this AM    COUNSELING:  Sakina was counseled on A.C.O.G. Pap guidelines and recommendations for yearly pelvic exams in addition to recommendations for monthly self breast exams; to see her PCP for other health maintenance.

## 2025-05-15 ENCOUNTER — HOSPITAL ENCOUNTER (OUTPATIENT)
Dept: RADIOLOGY | Facility: HOSPITAL | Age: 60
Discharge: HOME | End: 2025-05-15
Attending: OBSTETRICS & GYNECOLOGY
Payer: COMMERCIAL

## 2025-05-15 ENCOUNTER — RESULTS FOLLOW-UP (OUTPATIENT)
Dept: OBSTETRICS AND GYNECOLOGY | Facility: CLINIC | Age: 60
End: 2025-05-15

## 2025-05-15 VITALS — WEIGHT: 140 LBS | BODY MASS INDEX: 25.76 KG/M2 | HEIGHT: 62 IN

## 2025-05-15 DIAGNOSIS — R92.2 INCONCLUSIVE MAMMOGRAM: ICD-10-CM

## 2025-05-15 PROCEDURE — 77066 DX MAMMO INCL CAD BI: CPT | Mod: 26,,, | Performed by: RADIOLOGY

## 2025-05-15 PROCEDURE — 77062 BREAST TOMOSYNTHESIS BI: CPT | Mod: 26,,, | Performed by: RADIOLOGY

## 2025-05-15 PROCEDURE — 77062 BREAST TOMOSYNTHESIS BI: CPT | Mod: TC

## 2025-08-11 DIAGNOSIS — J70.9 RESPIRATORY CONDITIONS DUE TO UNSPECIFIED EXTERNAL AGENT: Primary | ICD-10-CM

## 2025-08-18 ENCOUNTER — TELEPHONE (OUTPATIENT)
Dept: ENDOSCOPY | Facility: HOSPITAL | Age: 60
End: 2025-08-18
Payer: COMMERCIAL

## 2025-08-18 ENCOUNTER — OFFICE VISIT (OUTPATIENT)
Dept: GASTROENTEROLOGY | Facility: CLINIC | Age: 60
End: 2025-08-18
Payer: COMMERCIAL

## 2025-08-18 VITALS
WEIGHT: 138 LBS | HEART RATE: 69 BPM | BODY MASS INDEX: 25.4 KG/M2 | SYSTOLIC BLOOD PRESSURE: 120 MMHG | HEIGHT: 62 IN | DIASTOLIC BLOOD PRESSURE: 80 MMHG

## 2025-08-18 DIAGNOSIS — R13.10 DYSPHAGIA, UNSPECIFIED TYPE: Primary | ICD-10-CM

## 2025-08-18 DIAGNOSIS — T17.908A ASPIRATION INTO AIRWAY, INITIAL ENCOUNTER: ICD-10-CM

## 2025-08-18 DIAGNOSIS — F17.210 CIGARETTE NICOTINE DEPENDENCE WITHOUT COMPLICATION: ICD-10-CM

## 2025-08-18 PROCEDURE — 4010F ACE/ARB THERAPY RXD/TAKEN: CPT | Mod: CPTII,S$GLB,,

## 2025-08-18 PROCEDURE — 99999 PR PBB SHADOW E&M-EST. PATIENT-LVL IV: CPT | Mod: PBBFAC,,,

## 2025-08-18 PROCEDURE — 3008F BODY MASS INDEX DOCD: CPT | Mod: CPTII,S$GLB,,

## 2025-08-18 PROCEDURE — 3079F DIAST BP 80-89 MM HG: CPT | Mod: CPTII,S$GLB,,

## 2025-08-18 PROCEDURE — 3074F SYST BP LT 130 MM HG: CPT | Mod: CPTII,S$GLB,,

## 2025-08-18 PROCEDURE — 99204 OFFICE O/P NEW MOD 45 MIN: CPT | Mod: S$GLB,,,

## 2025-08-18 RX ORDER — FAMOTIDINE 40 MG/1
40 TABLET, FILM COATED ORAL NIGHTLY PRN
Qty: 30 TABLET | Refills: 11 | Status: SHIPPED | OUTPATIENT
Start: 2025-08-18 | End: 2026-08-18

## 2025-08-20 ENCOUNTER — HOSPITAL ENCOUNTER (OUTPATIENT)
Facility: HOSPITAL | Age: 60
Discharge: HOME | End: 2025-08-20
Attending: STUDENT IN AN ORGANIZED HEALTH CARE EDUCATION/TRAINING PROGRAM | Admitting: STUDENT IN AN ORGANIZED HEALTH CARE EDUCATION/TRAINING PROGRAM
Payer: COMMERCIAL

## 2025-08-20 ENCOUNTER — ANESTHESIA (OUTPATIENT)
Dept: ENDOSCOPY | Facility: HOSPITAL | Age: 60
End: 2025-08-20
Payer: COMMERCIAL

## 2025-08-20 ENCOUNTER — ANESTHESIA EVENT (OUTPATIENT)
Dept: ENDOSCOPY | Facility: HOSPITAL | Age: 60
End: 2025-08-20
Payer: COMMERCIAL

## 2025-08-20 VITALS
RESPIRATION RATE: 16 BRPM | SYSTOLIC BLOOD PRESSURE: 126 MMHG | HEIGHT: 62 IN | HEART RATE: 68 BPM | DIASTOLIC BLOOD PRESSURE: 71 MMHG | BODY MASS INDEX: 25.23 KG/M2 | TEMPERATURE: 98 F | OXYGEN SATURATION: 99 % | WEIGHT: 137.13 LBS

## 2025-08-20 DIAGNOSIS — R13.10 DYSPHAGIA: ICD-10-CM

## 2025-08-20 PROCEDURE — 37000009 HC ANESTHESIA EA ADD 15 MINS: Performed by: STUDENT IN AN ORGANIZED HEALTH CARE EDUCATION/TRAINING PROGRAM

## 2025-08-20 PROCEDURE — 43235 EGD DIAGNOSTIC BRUSH WASH: CPT | Performed by: STUDENT IN AN ORGANIZED HEALTH CARE EDUCATION/TRAINING PROGRAM

## 2025-08-20 PROCEDURE — 37000008 HC ANESTHESIA 1ST 15 MINUTES: Performed by: STUDENT IN AN ORGANIZED HEALTH CARE EDUCATION/TRAINING PROGRAM

## 2025-08-20 PROCEDURE — 63600175 PHARM REV CODE 636 W HCPCS

## 2025-08-20 PROCEDURE — 43235 EGD DIAGNOSTIC BRUSH WASH: CPT | Mod: ,,, | Performed by: STUDENT IN AN ORGANIZED HEALTH CARE EDUCATION/TRAINING PROGRAM

## 2025-08-20 PROCEDURE — 25000003 PHARM REV CODE 250: Performed by: STUDENT IN AN ORGANIZED HEALTH CARE EDUCATION/TRAINING PROGRAM

## 2025-08-20 RX ORDER — PROPOFOL 10 MG/ML
VIAL (ML) INTRAVENOUS
Status: DISCONTINUED | OUTPATIENT
Start: 2025-08-20 | End: 2025-08-20

## 2025-08-20 RX ORDER — SODIUM CHLORIDE 9 MG/ML
INJECTION, SOLUTION INTRAVENOUS CONTINUOUS
Status: DISCONTINUED | OUTPATIENT
Start: 2025-08-20 | End: 2025-08-20 | Stop reason: HOSPADM

## 2025-08-20 RX ORDER — LIDOCAINE HYDROCHLORIDE 20 MG/ML
INJECTION INTRAVENOUS
Status: DISCONTINUED | OUTPATIENT
Start: 2025-08-20 | End: 2025-08-20

## 2025-08-20 RX ADMIN — LIDOCAINE HYDROCHLORIDE 50 MG: 20 INJECTION INTRAVENOUS at 11:08

## 2025-08-20 RX ADMIN — SODIUM CHLORIDE: 0.9 INJECTION, SOLUTION INTRAVENOUS at 11:08

## 2025-08-20 RX ADMIN — PROPOFOL 150 MCG/KG/MIN: 10 INJECTION, EMULSION INTRAVENOUS at 11:08

## 2025-08-20 RX ADMIN — PROPOFOL 80 MG: 10 INJECTION, EMULSION INTRAVENOUS at 11:08

## 2025-08-20 RX ADMIN — PROPOFOL 30 MG: 10 INJECTION, EMULSION INTRAVENOUS at 11:08

## 2025-08-20 RX ADMIN — GLYCOPYRROLATE 0.2 MG: 0.2 INJECTION, SOLUTION INTRAMUSCULAR; INTRAVENOUS at 11:08

## 2025-08-20 RX ADMIN — PROPOFOL 40 MG: 10 INJECTION, EMULSION INTRAVENOUS at 11:08

## 2025-08-25 ENCOUNTER — OFFICE VISIT (OUTPATIENT)
Dept: OTOLARYNGOLOGY | Facility: CLINIC | Age: 60
End: 2025-08-25
Payer: COMMERCIAL

## 2025-08-25 VITALS
WEIGHT: 137.38 LBS | BODY MASS INDEX: 25.12 KG/M2 | SYSTOLIC BLOOD PRESSURE: 132 MMHG | DIASTOLIC BLOOD PRESSURE: 78 MMHG

## 2025-08-25 DIAGNOSIS — R09.82 POSTNASAL DRIP: ICD-10-CM

## 2025-08-25 DIAGNOSIS — J30.1 NON-SEASONAL ALLERGIC RHINITIS DUE TO POLLEN: ICD-10-CM

## 2025-08-25 DIAGNOSIS — K21.9 LARYNGOPHARYNGEAL REFLUX (LPR): Primary | ICD-10-CM

## 2025-08-25 DIAGNOSIS — T17.308A CHOKING, INITIAL ENCOUNTER: ICD-10-CM

## 2025-08-25 DIAGNOSIS — T17.908A ASPIRATION INTO AIRWAY, INITIAL ENCOUNTER: ICD-10-CM

## 2025-08-25 PROCEDURE — 3008F BODY MASS INDEX DOCD: CPT | Mod: CPTII,S$GLB,, | Performed by: OTOLARYNGOLOGY

## 2025-08-25 PROCEDURE — 1159F MED LIST DOCD IN RCRD: CPT | Mod: CPTII,S$GLB,, | Performed by: OTOLARYNGOLOGY

## 2025-08-25 PROCEDURE — 3075F SYST BP GE 130 - 139MM HG: CPT | Mod: CPTII,S$GLB,, | Performed by: OTOLARYNGOLOGY

## 2025-08-25 PROCEDURE — 4010F ACE/ARB THERAPY RXD/TAKEN: CPT | Mod: CPTII,S$GLB,, | Performed by: OTOLARYNGOLOGY

## 2025-08-25 PROCEDURE — 31575 DIAGNOSTIC LARYNGOSCOPY: CPT | Mod: S$GLB,,, | Performed by: OTOLARYNGOLOGY

## 2025-08-25 PROCEDURE — 99204 OFFICE O/P NEW MOD 45 MIN: CPT | Mod: 25,S$GLB,, | Performed by: OTOLARYNGOLOGY

## 2025-08-25 PROCEDURE — 1160F RVW MEDS BY RX/DR IN RCRD: CPT | Mod: CPTII,S$GLB,, | Performed by: OTOLARYNGOLOGY

## 2025-08-25 PROCEDURE — 99999 PR PBB SHADOW E&M-EST. PATIENT-LVL III: CPT | Mod: PBBFAC,,, | Performed by: OTOLARYNGOLOGY

## 2025-08-25 PROCEDURE — 3078F DIAST BP <80 MM HG: CPT | Mod: CPTII,S$GLB,, | Performed by: OTOLARYNGOLOGY

## 2025-08-25 RX ORDER — IPRATROPIUM BROMIDE 21 UG/1
2 SPRAY, METERED NASAL 2 TIMES DAILY
Qty: 30 ML | Refills: 11 | Status: SHIPPED | OUTPATIENT
Start: 2025-08-25